# Patient Record
Sex: MALE | Race: WHITE | NOT HISPANIC OR LATINO | Employment: OTHER | URBAN - METROPOLITAN AREA
[De-identification: names, ages, dates, MRNs, and addresses within clinical notes are randomized per-mention and may not be internally consistent; named-entity substitution may affect disease eponyms.]

---

## 2017-01-19 DIAGNOSIS — M17.0 PRIMARY OSTEOARTHRITIS OF BOTH KNEES: Primary | ICD-10-CM

## 2017-01-19 RX ORDER — CELECOXIB 200 MG/1
200 CAPSULE ORAL 2 TIMES DAILY
Qty: 60 CAPSULE | Refills: 2 | Status: SHIPPED | OUTPATIENT
Start: 2017-01-19 | End: 2017-04-18 | Stop reason: SDUPTHER

## 2017-04-18 DIAGNOSIS — M17.0 PRIMARY OSTEOARTHRITIS OF BOTH KNEES: ICD-10-CM

## 2017-04-18 RX ORDER — CELECOXIB 200 MG/1
CAPSULE ORAL
Qty: 60 CAPSULE | Refills: 2 | Status: SHIPPED | OUTPATIENT
Start: 2017-04-18 | End: 2017-06-23 | Stop reason: ALTCHOICE

## 2017-05-23 DIAGNOSIS — Z00.00 ROUTINE GENERAL MEDICAL EXAMINATION AT A HEALTH CARE FACILITY: Primary | ICD-10-CM

## 2017-06-12 PROBLEM — M19.90 ARTHRITIS: Status: ACTIVE | Noted: 2017-06-12

## 2017-06-22 PROBLEM — M25.562 CHRONIC PAIN OF LEFT KNEE: Status: ACTIVE | Noted: 2017-06-22

## 2017-06-22 PROBLEM — G89.29 CHRONIC PAIN OF LEFT KNEE: Status: ACTIVE | Noted: 2017-06-22

## 2017-06-23 ENCOUNTER — OFFICE VISIT (OUTPATIENT)
Dept: ORTHOPEDICS | Facility: CLINIC | Age: 62
End: 2017-06-23
Payer: COMMERCIAL

## 2017-06-23 VITALS — WEIGHT: 247.44 LBS | BODY MASS INDEX: 35.42 KG/M2 | HEIGHT: 70 IN

## 2017-06-23 DIAGNOSIS — M17.11 PRIMARY OSTEOARTHRITIS OF RIGHT KNEE: ICD-10-CM

## 2017-06-23 DIAGNOSIS — M25.561 ACUTE PAIN OF RIGHT KNEE: Primary | ICD-10-CM

## 2017-06-23 PROCEDURE — 99213 OFFICE O/P EST LOW 20 MIN: CPT | Mod: 25,S$GLB,, | Performed by: NURSE PRACTITIONER

## 2017-06-23 PROCEDURE — 20610 DRAIN/INJ JOINT/BURSA W/O US: CPT | Mod: RT,S$GLB,, | Performed by: NURSE PRACTITIONER

## 2017-06-23 PROCEDURE — 99999 PR PBB SHADOW E&M-EST. PATIENT-LVL III: CPT | Mod: PBBFAC,,, | Performed by: NURSE PRACTITIONER

## 2017-06-23 RX ORDER — CYCLOBENZAPRINE HCL 5 MG
5 TABLET ORAL 3 TIMES DAILY PRN
Qty: 40 TABLET | Refills: 1 | Status: SHIPPED | OUTPATIENT
Start: 2017-06-23 | End: 2017-07-03

## 2017-06-23 RX ORDER — OXAPROZIN 600 MG/1
600 TABLET, FILM COATED ORAL DAILY
Qty: 30 TABLET | Refills: 1 | Status: SHIPPED | OUTPATIENT
Start: 2017-06-23 | End: 2017-07-10

## 2017-06-23 RX ADMIN — TRIAMCINOLONE ACETONIDE 40 MG: 40 INJECTION, SUSPENSION INTRA-ARTICULAR; INTRAMUSCULAR at 08:06

## 2017-06-25 RX ORDER — TRIAMCINOLONE ACETONIDE 40 MG/ML
40 INJECTION, SUSPENSION INTRA-ARTICULAR; INTRAMUSCULAR
Status: COMPLETED | OUTPATIENT
Start: 2017-06-23 | End: 2017-06-23

## 2017-06-26 NOTE — PROGRESS NOTES
CC: Pain of the Right Knee      HPI: Pt with right knee pain for the past several weeks. The pain is aching and medial and worse with activity. He had a left knee makoplasty several years ago at [facility], but he's not ready to move forward with surgery at this time. He has had cortisone and gel injections in the past with minimal improvement. He has tried several nsaids with minimal improvement, most recently celebrex. He recently retired and has been moving to his new house. He is very frustrated by the pain. He is ambulating without assistive device. There is not a limp.    ROS  General: denies fever and chills  Resp: no c/o sob  CVS: no c/o cp  MSK: c/o right knee pain which is medial and aching    PE  General: AAOx3, pleasant and cooperative  Resp: respirations even and unlabored  MSK: right knee exam  5 degrees extension  110 degrees flexion  No warmth or erythema   - effusion    Xray:  Reviewed by me from September: There is moderate DJD and a varus deformity.  No fracture dislocation bone destruction seen.    Assessment:  Right knee djd    Plan:  Cortisone injection right knee today  Change nsaids to daypro  Consider surgical options and f/u when ready or sooner as needed.    Knee Injection Procedure Note    Pre-operative Diagnosis: right knee degenerative arthritis    Post-operative Diagnosis: same    Indications: right knee pain    Anesthesia: none    Procedure Details     Verbal consent was obtained for the procedure. The injection site was identified and the skin was prepared with alcohol. The right knee was injected from an anterolateral approach with 1 ml of Kenalog and 5 ml Lidocaine under sterile technique using a 22 gauge needle. The needle was removed and the area cleansed and dressed.    Complications:  None; patient tolerated the procedure well.    he was advised to rest the knee today, using ice and elevation as needed for comfort and swelling. he did receive immediate relief of the knee pain. he  was told this would be short lived and is secondary to the lidocaine. he may have an increase in discomfort tonight followed by steady improvement over the next several days. It may take 1-3 weeks following the injection to get the full benefit of the medication.

## 2017-06-28 ENCOUNTER — CLINICAL SUPPORT (OUTPATIENT)
Dept: INTERNAL MEDICINE | Facility: CLINIC | Age: 62
End: 2017-06-28
Payer: COMMERCIAL

## 2017-06-28 ENCOUNTER — OFFICE VISIT (OUTPATIENT)
Dept: INTERNAL MEDICINE | Facility: CLINIC | Age: 62
End: 2017-06-28
Payer: COMMERCIAL

## 2017-06-28 ENCOUNTER — HOSPITAL ENCOUNTER (OUTPATIENT)
Dept: CARDIOLOGY | Facility: CLINIC | Age: 62
Discharge: HOME | End: 2017-06-28
Payer: COMMERCIAL

## 2017-06-28 DIAGNOSIS — Z00.00 ROUTINE GENERAL MEDICAL EXAMINATION AT A HEALTH CARE FACILITY: ICD-10-CM

## 2017-06-28 DIAGNOSIS — L57.0 AK (ACTINIC KERATOSIS): ICD-10-CM

## 2017-06-28 DIAGNOSIS — Z00.00 ROUTINE GENERAL MEDICAL EXAMINATION AT A HEALTH CARE FACILITY: Primary | ICD-10-CM

## 2017-06-28 DIAGNOSIS — E03.8 SUBCLINICAL HYPOTHYROIDISM: ICD-10-CM

## 2017-06-28 LAB
ALBUMIN SERPL BCP-MCNC: 3.9 G/DL
ALP SERPL-CCNC: 63 U/L
ALT SERPL W/O P-5'-P-CCNC: 30 U/L
ANION GAP SERPL CALC-SCNC: 10 MMOL/L
AST SERPL-CCNC: 21 U/L
BILIRUB SERPL-MCNC: 0.6 MG/DL
BUN SERPL-MCNC: 23 MG/DL
CALCIUM SERPL-MCNC: 9.7 MG/DL
CHLORIDE SERPL-SCNC: 102 MMOL/L
CHOLEST/HDLC SERPL: 3.4 {RATIO}
CO2 SERPL-SCNC: 28 MMOL/L
COMPLEXED PSA SERPL-MCNC: 0.65 NG/ML
CREAT SERPL-MCNC: 1 MG/DL
ERYTHROCYTE [DISTWIDTH] IN BLOOD BY AUTOMATED COUNT: 12.3 %
EST. GFR  (AFRICAN AMERICAN): >60 ML/MIN/1.73 M^2
EST. GFR  (NON AFRICAN AMERICAN): >60 ML/MIN/1.73 M^2
ESTIMATED AVG GLUCOSE: 108 MG/DL
GLUCOSE SERPL-MCNC: 97 MG/DL
HBA1C MFR BLD HPLC: 5.4 %
HCT VFR BLD AUTO: 44.4 %
HDL/CHOLESTEROL RATIO: 29 %
HDLC SERPL-MCNC: 200 MG/DL
HDLC SERPL-MCNC: 58 MG/DL
HGB BLD-MCNC: 15.6 G/DL
LDLC SERPL CALC-MCNC: 118.8 MG/DL
MCH RBC QN AUTO: 32.8 PG
MCHC RBC AUTO-ENTMCNC: 35.1 %
MCV RBC AUTO: 93 FL
NONHDLC SERPL-MCNC: 142 MG/DL
PLATELET # BLD AUTO: 252 K/UL
PMV BLD AUTO: 10.1 FL
POTASSIUM SERPL-SCNC: 4.4 MMOL/L
PROT SERPL-MCNC: 8 G/DL
RBC # BLD AUTO: 4.76 M/UL
SODIUM SERPL-SCNC: 140 MMOL/L
T4 FREE SERPL-MCNC: 0.94 NG/DL
TRIGL SERPL-MCNC: 116 MG/DL
TSH SERPL DL<=0.005 MIU/L-ACNC: 4.29 UIU/ML
WBC # BLD AUTO: 5.5 K/UL

## 2017-06-28 PROCEDURE — 97802 MEDICAL NUTRITION INDIV IN: CPT | Mod: S$GLB,,, | Performed by: INTERNAL MEDICINE

## 2017-06-28 PROCEDURE — 84153 ASSAY OF PSA TOTAL: CPT

## 2017-06-28 PROCEDURE — 80053 COMPREHEN METABOLIC PANEL: CPT

## 2017-06-28 PROCEDURE — 85027 COMPLETE CBC AUTOMATED: CPT

## 2017-06-28 PROCEDURE — 93000 ELECTROCARDIOGRAM COMPLETE: CPT | Mod: S$GLB,,, | Performed by: INTERNAL MEDICINE

## 2017-06-28 PROCEDURE — 83036 HEMOGLOBIN GLYCOSYLATED A1C: CPT

## 2017-06-28 PROCEDURE — 99397 PER PM REEVAL EST PAT 65+ YR: CPT | Mod: S$GLB,,, | Performed by: INTERNAL MEDICINE

## 2017-06-28 PROCEDURE — 80061 LIPID PANEL: CPT

## 2017-06-28 PROCEDURE — 84443 ASSAY THYROID STIM HORMONE: CPT

## 2017-06-28 PROCEDURE — 84439 ASSAY OF FREE THYROXINE: CPT

## 2017-06-28 PROCEDURE — 99999 PR PBB SHADOW E&M-EST. PATIENT-LVL III: CPT | Mod: PBBFAC,,, | Performed by: INTERNAL MEDICINE

## 2017-06-28 PROCEDURE — 97750 PHYSICAL PERFORMANCE TEST: CPT | Mod: S$GLB,,, | Performed by: INTERNAL MEDICINE

## 2017-06-28 NOTE — PROGRESS NOTES
"Nutrition Assessment  Client name:  Elmer Borrero  :  1955  Age:  61 y.o.  Gender:  male    PMH: mother has arthritis  Client states: Client has an extensive PMH list. Many things in his life have changed since last visit. He retired March this year, is living in a new house and still in the process of moving boxes and transports his Dad daily to visit his Mom who is temporarily in an Assisted Living Facility. He enjoys fishing and working with his hands such as crafting rings from bolts in his workshop. His joint/knee pain has worsened from last year and his back vertebrate is deteriorating. It is hard for him to ride his bike or stand on his feet for long periods because it is hard on his joints. He has tried Glucosamine, joint support supplement and Krill oil all without any results and has discontinued them all. His PCP in [place], began him on Oxaprozin and Flexeril which hs given him good results in relaxing his "tight leg muscle". He needs knee surgery, but is hesitant because past knee surgery, when coming out of anesthesia, his blood pressure danay to 200 and this is of great concern to him if this may reoccur. He will consider OR when the pain is intolerable. He no longer drinks distilled spirits, but mentions that "he needs to drink less alcohol" - but makes no specific commitment to reduction.  He asks about the use of Green Juices and Smoothies as the young guys at his previous job were drinking them and losing wt. He then adds he tried the drink, but did not like it. Also he asks about the benefits of Apple Cider vinegar for wt. Loss.  He is interested in losing wt. And mentions that he has to eat less fried Shrimp po boys and fries, reduce his portions, but adds that he does shares the meal with his wife, but dining out more due to his schedule.       Past Medical History:   Diagnosis Date    Arthritis     BPH (benign prostatic hyperplasia)     Hypertension     Kidney stone     GEOVANNA " "on CPAP     Renal calculi        Social History    Marital status:    Employment:  [employer] - retired March 2017  Social History   Substance Use Topics    Smoking status: Never Smoker    Smokeless tobacco: Never Used    Alcohol use Yes      Comment: 3 glasses wine daily        Current medications:  has a current medication list which includes the following prescription(s): allopurinol, aspirin, cyanocobalamin (vitamin b-12), cyclobenzaprine, diphenhydramine, hydrochlorothiazide, loratadine, losartan, montelukast,oxaprozin, and tamsulosin, and the following Facility-Administered Medications: euflexxa and triamcinolone acetonide.  Vitamins, minerals, and/or supplements:  unknown   Food allergies or intolerances: none     Food History  Dining out more recently since Mother in Assisted Living   Drinking no added sugar Cranberry Pomegranate juice and unsweetened tea     Exercise History:  No formal exercise - limited by knee pain    Lab Reports (labs unavailable at visit)   Total Cholesterol:  200   Triglycerides:  116  HDL:  58  LDL:  118.8  Glucose:  97  HbA1c:  5.4  BP:  166/86     Weight History  Height:  5'8"     Weight:  245  BMI:  37.33  % Body Fat:  36.62    Diagnosis  RMR (Method:  Body [place]):  1670 kcal  Activity Factor:  1.3  ROMAINE:  2171 - 250 = 1921    Obesity related to lack of physical activity (limited by knee pain) and improper food choices/portions and frequency of dining out as evidenced by BMI: 37.33 and Body fat: 36.62%.    Intervention    Goals:  1.  124 oz. Healthy fluids daily  2.  Apple cider vinegar if desired  3.  Reduce sauces and fried foods by 25%  4.  Consider 50% reduction in ETOH consumption  5.  Consider purchasing lean meats - i.e.: 93% ground beef  6.  Wt. <245#    7.  Consult with MD re: back/spine health         Current labs unavailable at visit.Discussed the number of empty calories in each glass of wine and how reduction can contribute to wt. Loss creating a daily deficit of " "500 calories. Explained that food must be the focus as regular exercise at this time is not tolerable. Explained the importance of BEE and how the body sheds pounds. Encouraged smaller portions using the plating method as visual and high focus on 1/2 plate non-starchy vegetables. Discussed the difference in fat and calories among 3 categories of meat and why lean is best for wt. Loss and his past medical history. Reviewed the Fast Food Guide for healthier choices when foods have to be chosen on the run. Explained the relevance of adequate hydration to fat loss and lipid metabolism and daily goal. Reviewed and encouraged use of the Eat Fit shopping list as a turn key guideline. Cautioned client about drinking juices and checking label for no added sugar. Answered question about apple cider vinegar and scientific results and green smoothies. Shared a cookbook titled, "Trim and Terrific" by Annie Schwartz for tasty cajun cooking with minimum fat and calories.    Handouts provided:  Meal Planning Guide  Restaurant Guide  Eat Fit Shopping List  Eat Fit Janette  Fast Food Guide  Vitamin/Mineral Guide    Monitoring/Evaluation    Monitor the following:  Weight  BMI  % Body Fat  Caloric intake  Labs:  CMP/lipids    Follow Up Plan:  Follow up with client in 1-2 years  "

## 2017-06-29 NOTE — PROGRESS NOTES
Subjective:       Patient ID: Elmer Borrero is a 61 y.o. male.    Chief Complaint: No chief complaint on file.    HPI   Mr. Borrero has no history of pulmonary disease, though regularly takes a beta-blocker for hypertension. He reports chronic lower back pain and right knee pain which limits his physical activity and mobility. The curl up, push up and flexibility sit and reach tests have all been deferred because of the lower back pain and not being able to get up and down off of the ground. Blood pressure was not within testing limits but necessary testing was already deferred.     Current Exercise Routine:   - None    Review of Systems    Objective:      The fitness evaluation results are as follows:    D.O.S. 6/28/2017 6/30/2016 7/22/2015   Height (in): 68 67.2 68   Weight (lbs): 245 251 251   BMI: 37.69697 39.056216 38.309421   Body Fat (%): 32.62 33.50 34.80   Waist (cm): 121 125 127   Hip (cm): 115 119 117   WHR: 1.05 1.05 1.09   RBP (mmHg): 166/86 140/80 130/80   RHR (bpm): 64 64 64    Strength R (lbs)t: 61.93146 70 59.463807    Strength Lt (lbs): 61.53993 71 64.964106   Push-up Assessment: Deferred Deferred Deferred   Curl-up Assessment: Deferred Deferred Deferred   Flexibility Testing (cm): Deferred Deferred 0   REE (kcals): 1670 2190 1980     Physical Exam    Assessment:      Age/Gender Stratified Assessment:    Resting BP: Elevated   Body Fat %: Poor   WHR Risk Factor: High Risk    Strength R: Below Average    Strength L: Average   Upper Body Endurance: Deferred   Abdominal Endurance: Deferred   Lower body Flexibility: Deferred     1. Routine general medical examination at a health care facility        Plan:     Mr. Borrero should begin incorporating aerobic exercise such as biking and swimming in order to reach recommended guidelines of 150 minutes of aerobic activity each week at a moderate intensity level. He should also begin resistance training to help increase  current level of muscular strength and endurance. Upper body strength has significantly decreased over the past year. Mr. Borrero needs to incorporate resistance training in order to improve his quality of life. Daily stretching should be performed in order to increase level of flexibility.     We have set a goal to ride the bike 2 days per week for 30 minutes and to stretch daily.

## 2017-07-06 VITALS — SYSTOLIC BLOOD PRESSURE: 148 MMHG | HEART RATE: 68 BPM | DIASTOLIC BLOOD PRESSURE: 84 MMHG

## 2017-07-06 PROBLEM — L57.0 AK (ACTINIC KERATOSIS): Status: ACTIVE | Noted: 2017-07-06

## 2017-07-06 PROBLEM — E03.8 SUBCLINICAL HYPOTHYROIDISM: Status: ACTIVE | Noted: 2017-07-06

## 2017-07-06 NOTE — PROGRESS NOTES
Subjective:       Patient ID: Elmer Borrero is a 61 y.o. male.    Chief Complaint: Executive Health    HPIPleasant gentleman from [place], [place] here for his Executive Health exam. Overall doing well. We discussed his history of degenerative disc disease of the lumbar spine and knees. He is currently having issues with his right knee and recently was seen by Orthopedics and underwent an steroid injection in his knee with some improvement. He was placed on Daypro which he takes as needed. We also dicussed the importance of proper nutrition, exercise, weight management vis jose future health issues. He recently retired and will make more time for these activities.  I am sending copies of his studies including blood work that showed mild increase in TSH level at 4.291 - new finding. Free T4  Was low normal at 0.94. I will repeat TSH level in 4 months and treat if indicated. All other parameters were unremarkable including EKG.  Review of Systems   Constitutional: Positive for fatigue. Negative for activity change, appetite change and unexpected weight change.   HENT: Negative.    Respiratory: Negative for cough, shortness of breath and wheezing.    Cardiovascular: Negative for chest pain and palpitations.   Gastrointestinal: Negative for abdominal distention, abdominal pain and blood in stool.   Endocrine: Negative.    Genitourinary: Negative for difficulty urinating.   Musculoskeletal: Positive for arthralgias and back pain. Negative for joint swelling.   Neurological: Negative for dizziness, weakness, light-headedness and headaches.   Hematological: Negative.        Objective:      Physical Exam   Constitutional: He is oriented to person, place, and time. He appears well-developed and well-nourished. No distress.   HENT:   Head: Normocephalic and atraumatic.   Right Ear: External ear normal.   Left Ear: External ear normal.   Mouth/Throat: Oropharynx is clear and moist. No oropharyngeal exudate.   Eyes:  Conjunctivae and EOM are normal. Pupils are equal, round, and reactive to light. Right eye exhibits no discharge. Left eye exhibits no discharge. No scleral icterus.   Neck: Normal range of motion. Neck supple. No JVD present. No thyromegaly present.   Cardiovascular: Normal rate, regular rhythm, normal heart sounds and intact distal pulses.    No murmur heard.  Pulmonary/Chest: Effort normal and breath sounds normal. No respiratory distress. He has no wheezes.   Abdominal: Soft. Bowel sounds are normal. He exhibits no distension and no mass. There is no tenderness. A hernia is present.   Umbilical hernia.   Musculoskeletal: He exhibits no edema or tenderness.   Lymphadenopathy:     He has no cervical adenopathy.   Neurological: He is alert and oriented to person, place, and time. No cranial nerve deficit.   Skin: Skin is warm and dry. No rash noted. He is not diaphoretic. No erythema.   Small AK R proximal forearm.  Lipoma R supraclavicular area.   Psychiatric: He has a normal mood and affect. His behavior is normal. Judgment and thought content normal.   Nursing note and vitals reviewed.      Assessment:       1. Routine general medical examination at a health care facility    2. Subclinical hypothyroidism    3. AK (actinic keratosis)        Plan:    1. Repeat TSH level in 4 months.         2. Refer to Dermatology.         3. Return to clinic in 1 ryear or sooner as indicated.

## 2017-07-09 DIAGNOSIS — M17.0 PRIMARY OSTEOARTHRITIS OF BOTH KNEES: ICD-10-CM

## 2017-07-10 RX ORDER — CELECOXIB 200 MG/1
CAPSULE ORAL
Qty: 60 CAPSULE | Refills: 2 | Status: SHIPPED | OUTPATIENT
Start: 2017-07-10 | End: 2017-09-04 | Stop reason: ALTCHOICE

## 2017-07-17 RX ORDER — LOSARTAN POTASSIUM 50 MG/1
TABLET ORAL
Qty: 90 TABLET | Refills: 3 | Status: SHIPPED | OUTPATIENT
Start: 2017-07-17 | End: 2018-07-04 | Stop reason: SDUPTHER

## 2017-09-04 DIAGNOSIS — M25.561 ACUTE PAIN OF RIGHT KNEE: ICD-10-CM

## 2017-09-04 RX ORDER — OXAPROZIN 600 MG/1
600 TABLET, FILM COATED ORAL DAILY
Qty: 30 TABLET | Refills: 1 | Status: SHIPPED | OUTPATIENT
Start: 2017-09-04 | End: 2017-11-14 | Stop reason: SDUPTHER

## 2017-10-17 ENCOUNTER — OFFICE VISIT (OUTPATIENT)
Dept: ORTHOPEDICS | Facility: CLINIC | Age: 62
End: 2017-10-17
Payer: COMMERCIAL

## 2017-10-17 VITALS — WEIGHT: 247.38 LBS | BODY MASS INDEX: 35.49 KG/M2

## 2017-10-17 DIAGNOSIS — M17.11 PRIMARY OSTEOARTHRITIS OF RIGHT KNEE: Primary | ICD-10-CM

## 2017-10-17 PROCEDURE — 99999 PR PBB SHADOW E&M-EST. PATIENT-LVL III: CPT | Mod: PBBFAC,,, | Performed by: NURSE PRACTITIONER

## 2017-10-17 PROCEDURE — 20610 DRAIN/INJ JOINT/BURSA W/O US: CPT | Mod: S$GLB,,, | Performed by: NURSE PRACTITIONER

## 2017-10-17 PROCEDURE — 99499 UNLISTED E&M SERVICE: CPT | Mod: S$GLB,,, | Performed by: NURSE PRACTITIONER

## 2017-10-17 RX ORDER — TRIAMCINOLONE ACETONIDE 40 MG/ML
40 INJECTION, SUSPENSION INTRA-ARTICULAR; INTRAMUSCULAR
Status: COMPLETED | OUTPATIENT
Start: 2017-10-17 | End: 2017-10-17

## 2017-10-17 RX ADMIN — TRIAMCINOLONE ACETONIDE 40 MG: 40 INJECTION, SUSPENSION INTRA-ARTICULAR; INTRAMUSCULAR at 01:10

## 2017-10-17 NOTE — PROGRESS NOTES
Pt with known djd of the right knee. He comes in to this visit with his wife and requests a cortisone injection for pain relief. His last injection was 6/23/17.    Knee Injection Procedure Note    Pre-operative Diagnosis: right knee degenerative arthritis    Post-operative Diagnosis: same    Indications: right knee pain    Anesthesia: none    Procedure Details     Verbal consent was obtained for the procedure. The injection site was identified and the skin was prepared with alcohol. The right knee was injected from an anterolateral approach with 1 ml of Kenalog and 5 ml Lidocaine under sterile technique using a 22 gauge needle. The needle was removed and the area cleansed and dressed.    Complications:  None; patient tolerated the procedure well.    he was advised to rest the knee today, using ice and elevation as needed for comfort and swelling. he did receive immediate relief of the knee pain. he was told this would be short lived and is secondary to the lidocaine. he may have an increase in discomfort tonight followed by steady improvement over the next several days. It may take 1-3 weeks following the injection to get the full benefit of the medication.

## 2017-11-14 DIAGNOSIS — M25.561 ACUTE PAIN OF RIGHT KNEE: ICD-10-CM

## 2017-11-14 RX ORDER — OXAPROZIN 600 MG/1
600 TABLET, FILM COATED ORAL DAILY
Qty: 30 TABLET | Refills: 1 | Status: SHIPPED | OUTPATIENT
Start: 2017-11-14 | End: 2017-11-14 | Stop reason: SDUPTHER

## 2017-11-15 RX ORDER — OXAPROZIN 600 MG/1
600 TABLET, FILM COATED ORAL DAILY
Qty: 30 TABLET | Refills: 0 | Status: SHIPPED | OUTPATIENT
Start: 2017-11-15 | End: 2018-01-29 | Stop reason: SDUPTHER

## 2018-01-29 DIAGNOSIS — M25.561 ACUTE PAIN OF RIGHT KNEE: ICD-10-CM

## 2018-01-29 RX ORDER — OXAPROZIN 600 MG/1
600 TABLET, FILM COATED ORAL DAILY
Qty: 30 TABLET | Refills: 0 | Status: SHIPPED | OUTPATIENT
Start: 2018-01-29 | End: 2018-02-26 | Stop reason: SDUPTHER

## 2018-02-26 DIAGNOSIS — M25.561 ACUTE PAIN OF RIGHT KNEE: ICD-10-CM

## 2018-02-26 RX ORDER — OXAPROZIN 600 MG/1
600 TABLET, FILM COATED ORAL DAILY
Qty: 30 TABLET | Refills: 0 | Status: SHIPPED | OUTPATIENT
Start: 2018-02-26 | End: 2018-03-26 | Stop reason: SDUPTHER

## 2018-03-26 DIAGNOSIS — M25.561 ACUTE PAIN OF RIGHT KNEE: ICD-10-CM

## 2018-03-26 RX ORDER — OXAPROZIN 600 MG/1
600 TABLET, FILM COATED ORAL DAILY
Qty: 30 TABLET | Refills: 0 | Status: SHIPPED | OUTPATIENT
Start: 2018-03-26 | End: 2018-04-23 | Stop reason: SDUPTHER

## 2018-04-23 DIAGNOSIS — M25.561 ACUTE PAIN OF RIGHT KNEE: ICD-10-CM

## 2018-04-23 RX ORDER — OXAPROZIN 600 MG/1
600 TABLET, FILM COATED ORAL DAILY
Qty: 30 TABLET | Refills: 0 | Status: SHIPPED | OUTPATIENT
Start: 2018-04-23 | End: 2018-06-07 | Stop reason: SDUPTHER

## 2018-06-07 DIAGNOSIS — M25.561 ACUTE PAIN OF RIGHT KNEE: ICD-10-CM

## 2018-06-11 RX ORDER — OXAPROZIN 600 MG/1
600 TABLET, FILM COATED ORAL DAILY
Qty: 30 TABLET | Refills: 0 | Status: SHIPPED | OUTPATIENT
Start: 2018-06-11 | End: 2018-07-08 | Stop reason: SDUPTHER

## 2018-07-08 DIAGNOSIS — M25.561 ACUTE PAIN OF RIGHT KNEE: ICD-10-CM

## 2018-07-08 RX ORDER — OXAPROZIN 600 MG/1
600 TABLET, FILM COATED ORAL DAILY
Qty: 30 TABLET | Refills: 0 | Status: SHIPPED | OUTPATIENT
Start: 2018-07-08 | End: 2018-08-04 | Stop reason: SDUPTHER

## 2018-07-09 RX ORDER — LOSARTAN POTASSIUM 50 MG/1
TABLET ORAL
Qty: 90 TABLET | Refills: 3 | Status: SHIPPED | OUTPATIENT
Start: 2018-07-09 | End: 2018-11-07

## 2018-07-26 ENCOUNTER — CLINICAL SUPPORT (OUTPATIENT)
Dept: INTERNAL MEDICINE | Facility: CLINIC | Age: 63
End: 2018-07-26
Payer: COMMERCIAL

## 2018-07-26 ENCOUNTER — OFFICE VISIT (OUTPATIENT)
Dept: INTERNAL MEDICINE | Facility: CLINIC | Age: 63
End: 2018-07-26
Payer: COMMERCIAL

## 2018-07-26 DIAGNOSIS — Z00.00 ROUTINE GENERAL MEDICAL EXAMINATION AT A HEALTH CARE FACILITY: Primary | ICD-10-CM

## 2018-07-26 LAB
25(OH)D3+25(OH)D2 SERPL-MCNC: 39 NG/ML
ALBUMIN SERPL BCP-MCNC: 3.8 G/DL
ALP SERPL-CCNC: 59 U/L
ALT SERPL W/O P-5'-P-CCNC: 32 U/L
ANION GAP SERPL CALC-SCNC: 12 MMOL/L
AST SERPL-CCNC: 23 U/L
BILIRUB SERPL-MCNC: 0.5 MG/DL
BUN SERPL-MCNC: 15 MG/DL
CALCIUM SERPL-MCNC: 9.5 MG/DL
CHLORIDE SERPL-SCNC: 101 MMOL/L
CHOLEST SERPL-MCNC: 189 MG/DL
CHOLEST/HDLC SERPL: 4.1 {RATIO}
CO2 SERPL-SCNC: 27 MMOL/L
COMPLEXED PSA SERPL-MCNC: 1.1 NG/ML
CREAT SERPL-MCNC: 0.8 MG/DL
ERYTHROCYTE [DISTWIDTH] IN BLOOD BY AUTOMATED COUNT: 12 %
EST. GFR  (AFRICAN AMERICAN): >60 ML/MIN/1.73 M^2
EST. GFR  (NON AFRICAN AMERICAN): >60 ML/MIN/1.73 M^2
ESTIMATED AVG GLUCOSE: 108 MG/DL
GLUCOSE SERPL-MCNC: 112 MG/DL
HBA1C MFR BLD HPLC: 5.4 %
HCT VFR BLD AUTO: 43.5 %
HDLC SERPL-MCNC: 46 MG/DL
HDLC SERPL: 24.3 %
HGB BLD-MCNC: 15.1 G/DL
LDLC SERPL CALC-MCNC: 104.4 MG/DL
MCH RBC QN AUTO: 32.5 PG
MCHC RBC AUTO-ENTMCNC: 34.7 G/DL
MCV RBC AUTO: 94 FL
NONHDLC SERPL-MCNC: 143 MG/DL
PLATELET # BLD AUTO: 239 K/UL
PMV BLD AUTO: 10.4 FL
POTASSIUM SERPL-SCNC: 3.4 MMOL/L
PROT SERPL-MCNC: 7.4 G/DL
RBC # BLD AUTO: 4.65 M/UL
SODIUM SERPL-SCNC: 140 MMOL/L
TRIGL SERPL-MCNC: 193 MG/DL
TSH SERPL DL<=0.005 MIU/L-ACNC: 3.46 UIU/ML
WBC # BLD AUTO: 4.7 K/UL

## 2018-07-26 PROCEDURE — 82306 VITAMIN D 25 HYDROXY: CPT

## 2018-07-26 PROCEDURE — 97750 PHYSICAL PERFORMANCE TEST: CPT | Mod: S$GLB,,, | Performed by: INTERNAL MEDICINE

## 2018-07-26 PROCEDURE — 3078F DIAST BP <80 MM HG: CPT | Mod: CPTII,S$GLB,, | Performed by: INTERNAL MEDICINE

## 2018-07-26 PROCEDURE — 84443 ASSAY THYROID STIM HORMONE: CPT

## 2018-07-26 PROCEDURE — 97802 MEDICAL NUTRITION INDIV IN: CPT | Mod: S$GLB,,, | Performed by: INTERNAL MEDICINE

## 2018-07-26 PROCEDURE — 3075F SYST BP GE 130 - 139MM HG: CPT | Mod: CPTII,S$GLB,, | Performed by: INTERNAL MEDICINE

## 2018-07-26 PROCEDURE — 99386 PREV VISIT NEW AGE 40-64: CPT | Mod: S$GLB,,, | Performed by: INTERNAL MEDICINE

## 2018-07-26 PROCEDURE — 84153 ASSAY OF PSA TOTAL: CPT

## 2018-07-26 PROCEDURE — 83036 HEMOGLOBIN GLYCOSYLATED A1C: CPT

## 2018-07-26 PROCEDURE — 80053 COMPREHEN METABOLIC PANEL: CPT

## 2018-07-26 PROCEDURE — 99999 PR PBB SHADOW E&M-EST. PATIENT-LVL III: CPT | Mod: PBBFAC,,, | Performed by: INTERNAL MEDICINE

## 2018-07-26 PROCEDURE — 80061 LIPID PANEL: CPT

## 2018-07-26 PROCEDURE — 85027 COMPLETE CBC AUTOMATED: CPT

## 2018-07-26 NOTE — PROGRESS NOTES
"Nutrition Assessment  This is a general nutrition consultation as per the contractual agreement of the client employer's insurance provider.    Client name:  Elmer Borrero  :  1955  Age:  62 y.o.  Gender:  male     PMH: Mother has arthritis  Client 's wife requests if she can join her  for his consultation, and he agrees that is fine with him. He presents wearing a hat with the word "Grouchy" embroidered on the visor.  Since last visit his mother  and in spite of being retired, he states he has less time than when he worked for [employer]. His medical history includes a kidney stone and currently is prescribed daily Allopurinol by his physican in [place]. Also he has re-started Krill oil and increased to 2 capsules per the advisement of his MD. He continues to have limited mobility with pain of his right knee complicated by his wt. Steroid injections are required 2x/yr which does give him some relief. States that current prescription medications are effective initially and then appear to not help so much. His joint/knee pain has worsened from last year and his back vertebrate is deteriorating. It is hard for him to ride his bike or stand on his feet for long periods because it is hard on his joints. He has tried Glucosamine, joint support supplement without any relief. His needs knee surgery, but is hesitant because his previous knee surgery, when coming out of anesthesia, his blood pressure danay to 200 and this is of great concern to him if this may reoccur. He will consider OR when the pain is intolerable. To assist with his sinus congestion, he recently purchased local honey from [place] and will assess the results. Additionally his left foot hurts, but MD said no explanation nor diagnoses could be determined. He manages to cut his lawn and daughter's lawn, but adds he has "lots of obstacles". Three weeks ago he had a cold and his endurance continues to be low.  He admits that his motivation " "is low and when he worked he had so much structure that he wants to enjoy his life. When asked about his ETOH intake, his reported number of drinks were 50% less than what his wife shared and she corrected him. He has 2 beers prior to dinner and 4 glasses of wine while watching TV. His speaks about his food intake in general terms and mentions that since his granddaughter stayed with them for the summer he has been eating more fried chicken. When asked what he was willing to do, he replied he would try to ride his bike as he desired increased stamina.    Past Medical History:   Diagnosis Date    Arthritis     BPH (benign prostatic hyperplasia)     Hypertension     Kidney stone     GEOVANNA on CPAP     Renal calculi        Social History    Marital status:    Employment:  [employer] -retired  Social History   Substance Use Topics    Smoking status: Never Smoker    Smokeless tobacco: Never Used    Alcohol use Yes      Comment: 14 beers/week,  28 glasses of wine/wk        Current medications:  has a current medication list which includes the following prescription(s): allopurinol, aspirin, cyclobenzaprine, finasteride, hydrochlorothiazide, krill oil, loratadine, losartan, montelukast, multivitamin, oxaprozin, and tamsulosin, and the following Facility-Administered Medications: euflexxa and triamcinolone acetonide.  Vitamins, minerals, and/or supplements:  Krill oil, MVI    Food allergies or intolerances:  none     Food History  Dining out more eating Fried Chicken  Drinking no added sugar Cranberry Pomegranate juice and unsweetened tea  Beer and wine daily    Exercise History: Limited mobility and pain in knee, spine and foot. Cuts 2 lawns    Lab Reports   Total Cholesterol:  189    Triglycerides:  193  HDL:  46  LDL:  104.4   Glucose:  112  HbA1c:  5.4  BP:  132/80     Weight History  Height:  5'8"     Weight:  261  BMI:  39.77  % Body Fat:  34.26    Diagnosis  RMR (Method:  Body [place]):  2010 kcal  Activity " Factor:  1.3  ROMAINE:  2613 - 500 =  2113    Obesity related to inadequate physical activity and excessive caloric intake as evidenced by BMI: 39.77 and Body fat: 34.26%.    Intervention    Goals:  1.  Increase stamina by 25%  2.  Consider reducing ETOH intake by 50%   3.  Ride bike daily for 5  Minutes and increase as tolerated   4.  Consider wt loss  5.  Consider eating dinner earlier to allow exercise time    Lab results reviewed with client ad complimented on heathy numbers. Discussed the acceptable healthy amount of ETOH daily for men and women. Encouraged any form and quantity of exercise and gave encouragement that it takes time to recondition the muscles and beginning with 5 minutes is 100% improvement over last year.    Handouts provided:  Meal Planning Guide  Restaurant Guide  Eat Fit Shopping List  Eat Fit Janette  Fast Food Guide  Vitamin/Mineral Guide    Monitoring/Evaluation    Monitor the following:  Weight  BMI  % Body Fat  Caloric intake  Labs: CMP/Lipids    Follow Up Plan:  Follow up with client in 1-2 years

## 2018-07-26 NOTE — PROGRESS NOTES
Subjective:       Patient ID: Elmer Borrero is a 62 y.o. male.    Chief Complaint: No chief complaint on file.    HPI   Pt. Has no significant cardiovascular or pulmonary history.  Pt. Is taking an ARB, a calcium channel blocker, and a diuretic to control his blood pressure.      Physical Limitations:  Pt. Has issues with his left foot, both knees, and his back.  He is not bale to get on the floor and deferred the push-up, curl up, and flexibility tests.    Current exercise routine: None    Goals: Pt. Has a goal weight of 245 lbs.  He knows that he needs to lose weight for his health and to take pressure off oh his joints.      Fun Facts:  Pt. Was semi engaged but mostly just sat and listened    Review of Systems    Objective:     The fitness evaluation results are as follows:  D.O.S. 7/26/2018 6/28/2017 6/30/2016 7/22/2015   Height (in): 68 68 67.2 68   Weight (lbs): 261 245 251 251   BMI: 39.77085 37.880650 39.658617 38.45728   Body Fat (%): 34.26 32.62 33.50 34.80   Waist (cm): 124 121 125 127   Hip (cm): 122 115 119 117   WHR: 1.02 1.05 1.05 1.09   RBP (mmHg): 132/80 166/86 140/80 130/80   RHR (bpm): 70 64 64 64    Strength R (lbs)t: 48.37498 61.515717 70 59.90310    Strength Lt (lbs): 65 61.531198 71 64.32575   Push-up Assessment: deferred Deferred Deferred Deferred   Curl-up Assessment: deferred Deferred Deferred Deferred   Flexibility Testing (cm): deferred Deferred Deferred 0   REE (kcals): 2010 1670 2190 1980         Physical Exam    Assessment:     Age/gender stratified assessment:  Resting BP: Within Normal Limits   Body Fat %: poor   WHR Risk Factor: high risk    Strength R: below average    Strength L: average   Upper Body Endurance: deferred   Abdominal Endurance: deferred   Lower body Flexibiltiy: deferred       1. Routine general medical examination at a health care facility        Plan:       Recommended fitness guidelines:    -150 minutes of moderate intensity aerobic  exercise per week or 75 minutes of vigorous intensity aerobic exercise per week.   Try to add some biking or swimming into your routine for some non-weight bearing aerobic activity.    -2 to 4 days per week of resistance training for each muscle group.  Start to incorporate the upper and lower body resistance training program given during the evaluation.    -Daily stretching with a hold of at least 30 seconds per muscle group.

## 2018-08-02 VITALS
HEART RATE: 70 BPM | SYSTOLIC BLOOD PRESSURE: 132 MMHG | WEIGHT: 263.19 LBS | DIASTOLIC BLOOD PRESSURE: 74 MMHG | BODY MASS INDEX: 37.77 KG/M2 | TEMPERATURE: 98 F

## 2018-08-02 NOTE — PROGRESS NOTES
Subjective:       Patient ID: Elmer Borrero is a 62 y.o. male.    Chief Complaint: Executive Health    HPIPleasant gentleman from [place], [place] here for his Executive Health exam. Overall doing well and had no specific complaints. He reported having had issues with back strain a couple of months ago, but that has resolved for the most part. He has been having issues with knee pain. He has history of degenerative arthritis of the knees and is s/p left partial knee replacement in the past. He has been experiencing pain in his right knee and has had steroid injections for relief, albeit temporary. He is likely going to need a replacement in the future, but he is not ready for that at this time.  I am sending copies of his studies including blood work hat showed mild increase in fasting glucose level a 112. Lipd profile showed mild increase in triglycerides at 163, but all other parameters were within normal limits  Review of Systems   Constitutional: Negative for activity change, appetite change, fatigue and unexpected weight change.   HENT: Negative.    Eyes: Negative for visual disturbance.   Respiratory: Negative for cough and shortness of breath.    Cardiovascular: Negative for chest pain, palpitations and leg swelling.   Genitourinary: Negative for difficulty urinating.   Musculoskeletal: Positive for arthralgias, back pain and joint swelling.   Skin: Negative.    Neurological: Negative for dizziness, light-headedness and headaches.   Hematological: Negative.        Objective:      Physical Exam   Constitutional: He is oriented to person, place, and time. He appears well-developed and well-nourished. No distress.   HENT:   Head: Normocephalic and atraumatic.   Right Ear: External ear normal.   Left Ear: External ear normal.   Mouth/Throat: Oropharynx is clear and moist. No oropharyngeal exudate.   Eyes: Conjunctivae and EOM are normal. Pupils are equal, round, and reactive to light. Right eye exhibits no  discharge. Left eye exhibits no discharge. No scleral icterus.   Neck: Normal range of motion. Neck supple. No JVD present. No thyromegaly present.   Cardiovascular: Normal rate, regular rhythm, normal heart sounds and intact distal pulses.    No murmur heard.  Pulmonary/Chest: Effort normal and breath sounds normal. No respiratory distress. He has no wheezes. He exhibits no tenderness.   Abdominal: Soft. Bowel sounds are normal. He exhibits no mass. There is no tenderness.   Umbilical hernia.   Musculoskeletal: Normal range of motion. He exhibits no edema or tenderness.   Neurological: He is alert and oriented to person, place, and time. No cranial nerve deficit. Coordination normal.   Skin: Skin is warm and dry. He is not diaphoretic.   Psychiatric: He has a normal mood and affect. His behavior is normal. Judgment and thought content normal.   Nursing note and vitals reviewed.      Assessment:       1. Routine general medical examination at a health care facility        Plan:    1. Return to clinic in 1 year or sooner if needed.

## 2018-08-04 DIAGNOSIS — M25.561 ACUTE PAIN OF RIGHT KNEE: ICD-10-CM

## 2018-08-05 RX ORDER — OXAPROZIN 600 MG/1
600 TABLET, FILM COATED ORAL DAILY
Qty: 30 TABLET | Refills: 0 | Status: SHIPPED | OUTPATIENT
Start: 2018-08-05 | End: 2018-08-27 | Stop reason: ALTCHOICE

## 2018-08-27 DIAGNOSIS — M25.50 ARTHRALGIA, UNSPECIFIED JOINT: Primary | ICD-10-CM

## 2018-08-27 RX ORDER — METHYLPREDNISOLONE 4 MG/1
TABLET ORAL
Qty: 1 PACKAGE | Refills: 0 | Status: SHIPPED | OUTPATIENT
Start: 2018-08-27 | End: 2018-09-17

## 2018-08-27 RX ORDER — MELOXICAM 15 MG/1
15 TABLET ORAL DAILY
Qty: 30 TABLET | Refills: 1 | Status: SHIPPED | OUTPATIENT
Start: 2018-08-27 | End: 2018-10-21 | Stop reason: SDUPTHER

## 2018-10-16 ENCOUNTER — TELEPHONE (OUTPATIENT)
Dept: ORTHOPEDICS | Facility: CLINIC | Age: 63
End: 2018-10-16

## 2018-10-16 NOTE — TELEPHONE ENCOUNTER
----- Message from Jade Mccormick sent at 10/16/2018 12:23 PM CDT -----  Contact: Faith-Wife  Called requesting a return call back from Renu or Amada regarding an injection appt. Wife could be reached at [phone]

## 2018-10-16 NOTE — TELEPHONE ENCOUNTER
Spoke with  and informed her that unfortunately we did not have anything available for  on Friday 10/19. She then asked if it would be possible to schedule an appt for tomorrow. After viewing the schedule a spot was opened and I was able to book  for tomorrow at 1:30pm.

## 2018-10-17 ENCOUNTER — OFFICE VISIT (OUTPATIENT)
Dept: ORTHOPEDICS | Facility: CLINIC | Age: 63
End: 2018-10-17
Payer: COMMERCIAL

## 2018-10-17 VITALS
WEIGHT: 263.25 LBS | HEART RATE: 81 BPM | BODY MASS INDEX: 37.69 KG/M2 | HEIGHT: 70 IN | DIASTOLIC BLOOD PRESSURE: 85 MMHG | SYSTOLIC BLOOD PRESSURE: 155 MMHG

## 2018-10-17 DIAGNOSIS — M17.0 PRIMARY OSTEOARTHRITIS OF BOTH KNEES: Primary | ICD-10-CM

## 2018-10-17 PROCEDURE — 99999 PR PBB SHADOW E&M-EST. PATIENT-LVL IV: CPT | Mod: PBBFAC,,, | Performed by: NURSE PRACTITIONER

## 2018-10-17 PROCEDURE — 20610 DRAIN/INJ JOINT/BURSA W/O US: CPT | Mod: RT,S$GLB,, | Performed by: NURSE PRACTITIONER

## 2018-10-17 PROCEDURE — 99499 UNLISTED E&M SERVICE: CPT | Mod: S$GLB,,, | Performed by: NURSE PRACTITIONER

## 2018-10-17 RX ORDER — TRIAMCINOLONE ACETONIDE 40 MG/ML
80 INJECTION, SUSPENSION INTRA-ARTICULAR; INTRAMUSCULAR
Status: COMPLETED | OUTPATIENT
Start: 2018-10-17 | End: 2018-10-17

## 2018-10-17 RX ADMIN — TRIAMCINOLONE ACETONIDE 80 MG: 40 INJECTION, SUSPENSION INTRA-ARTICULAR; INTRAMUSCULAR at 03:10

## 2018-10-17 NOTE — PROGRESS NOTES
Pt with known bilateral knee djd. He has a partial knee replacement on the left, but has had cortisone injections for pain relief in the past. He would like to repeat cortisone injections for pain relief today.    Knee Injection Procedure Note    Diagnosis: bilateral knee degenerative arthritis  Indications: bilateral knee pain  Procedure Details: Verbal consent was obtained for the procedure. The injection site was identified and the skin was prepared with alcohol. The right knee was injected from an anterolateral approach with 1 ml of Kenalog and 4 ml Lidocaine and the left knee was injected from an anterolateral approach with 1 ml of kenalog and 4 ml lidocaine under sterile technique using a 22 gauge needle. The needle was removed and the area cleansed and dressed.  Complications:  Patient tolerated the procedure well.    he was advised to rest the knee today, using ice and elevation as needed for comfort and swelling.Immediate relief of the knee pain may be short lived and secondary to the lidocaine. he may have an increase in discomfort tonight followed by steady improvement over the next several days. It may take 1-2 weeks following the injection to get the full benefit of the medication.

## 2018-10-21 DIAGNOSIS — M25.50 ARTHRALGIA, UNSPECIFIED JOINT: ICD-10-CM

## 2018-10-21 RX ORDER — MELOXICAM 15 MG/1
TABLET ORAL
Qty: 30 TABLET | Refills: 1 | Status: SHIPPED | OUTPATIENT
Start: 2018-10-21 | End: 2019-01-23 | Stop reason: SDUPTHER

## 2019-01-23 DIAGNOSIS — M25.50 ARTHRALGIA, UNSPECIFIED JOINT: ICD-10-CM

## 2019-01-23 RX ORDER — MELOXICAM 15 MG/1
TABLET ORAL
Qty: 90 TABLET | Refills: 0 | Status: SHIPPED | OUTPATIENT
Start: 2019-01-23 | End: 2019-04-16 | Stop reason: SDUPTHER

## 2019-04-16 DIAGNOSIS — M25.50 ARTHRALGIA, UNSPECIFIED JOINT: ICD-10-CM

## 2019-04-16 RX ORDER — MELOXICAM 15 MG/1
TABLET ORAL
Qty: 90 TABLET | Refills: 0 | Status: SHIPPED | OUTPATIENT
Start: 2019-04-16 | End: 2019-07-14 | Stop reason: SDUPTHER

## 2019-04-26 ENCOUNTER — OFFICE VISIT (OUTPATIENT)
Dept: ORTHOPEDICS | Facility: CLINIC | Age: 64
End: 2019-04-26
Payer: COMMERCIAL

## 2019-04-26 VITALS — BODY MASS INDEX: 38.75 KG/M2 | WEIGHT: 270.63 LBS | HEIGHT: 70 IN

## 2019-04-26 DIAGNOSIS — M17.11 PRIMARY OSTEOARTHRITIS OF RIGHT KNEE: Primary | ICD-10-CM

## 2019-04-26 PROCEDURE — 3008F PR BODY MASS INDEX (BMI) DOCUMENTED: ICD-10-PCS | Mod: CPTII,S$GLB,, | Performed by: NURSE PRACTITIONER

## 2019-04-26 PROCEDURE — 20610 DRAIN/INJ JOINT/BURSA W/O US: CPT | Mod: RT,S$GLB,, | Performed by: NURSE PRACTITIONER

## 2019-04-26 PROCEDURE — 99999 PR PBB SHADOW E&M-EST. PATIENT-LVL II: ICD-10-PCS | Mod: PBBFAC,,, | Performed by: NURSE PRACTITIONER

## 2019-04-26 PROCEDURE — 99999 PR PBB SHADOW E&M-EST. PATIENT-LVL II: CPT | Mod: PBBFAC,,, | Performed by: NURSE PRACTITIONER

## 2019-04-26 PROCEDURE — 99213 PR OFFICE/OUTPT VISIT, EST, LEVL III, 20-29 MIN: ICD-10-PCS | Mod: 25,S$GLB,, | Performed by: NURSE PRACTITIONER

## 2019-04-26 PROCEDURE — 99213 OFFICE O/P EST LOW 20 MIN: CPT | Mod: 25,S$GLB,, | Performed by: NURSE PRACTITIONER

## 2019-04-26 PROCEDURE — 3008F BODY MASS INDEX DOCD: CPT | Mod: CPTII,S$GLB,, | Performed by: NURSE PRACTITIONER

## 2019-04-26 PROCEDURE — 20610 PR DRAIN/INJECT LARGE JOINT/BURSA: ICD-10-PCS | Mod: RT,S$GLB,, | Performed by: NURSE PRACTITIONER

## 2019-04-26 RX ADMIN — TRIAMCINOLONE ACETONIDE 40 MG: 40 INJECTION, SUSPENSION INTRA-ARTICULAR; INTRAMUSCULAR at 08:04

## 2019-04-26 NOTE — PROGRESS NOTES
CC: Pain of the Left Knee and Pain of the Right Knee      HPI: Pt with c/o bilateral knee pain for the past few weeks. He had a left knee unicondylar replacement by Dr. Shane in 2002. He denies any problems with the left knee until a year or so ago. The pain in the left is lateral and worse with increased activity. The right knee is actually more painful and the pain is global and aching and also worse with increased activity. He is retired, but he remains active and has to take frequent breaks due to his knee pain. He has taken prescription nsaids, including celebrex and mobic, with minimal relief, but he is now having bilateral hand and feet swelling which may be related. He would like to repeat cortisone injections for right knee pain relief today. He is ambulating without assistive device. There is not a limp.    ROS  General: denies fever and chills  Resp: no c/o sob  CVS: no c/o cp  MSK: c/o bilateral knee pain which is global and worse with activity    PE  General: AAOx3, pleasant and cooperative  Resp: respirations even and unlabored  MSK: right knee exam  -5 degrees extension  100 degrees flexion  No warmth or erythema   - effusion    Left knee exam  0 degrees extension  110 degrees flexion  No warmth or erythema  - effusion    Assessment:  Right knee djd  Left lateral knee djd    Plan:  Appt made with Dr. Schultz to discuss possible left knee revision arthroplasty and right total knee replacement  Cortisone injection right knee today. He understands the risk of infection with injection in the left knee, so that is deferred  RICE  Follow up with primary care to discuss bilateral hand and foot swelling. Stop nsaids for now and see if there is improvement in the swelling  Tylenol prn for now    Knee Injection Procedure Note    Diagnosis: right knee degenerative arthritis  Indications: right knee pain  Procedure Details: Verbal consent was obtained for the procedure. The injection site was identified and the  skin was prepared with alcohol. The right knee was injected from an anterolateral approach with 1 ml of Kenalog and 4 ml Lidocaine under sterile technique using a 22 gauge needle. The needle was removed and the area cleansed and dressed.  Complications:  Patient tolerated the procedure well.    he was advised to rest the knee today, using ice and elevation as needed for comfort and swelling.Immediate relief of the knee pain may be short lived and secondary to the lidocaine. he may have an increase in discomfort tonight followed by steady improvement over the next several days. It may take 1-2 weeks following the injection to get the full benefit of the medication.

## 2019-04-28 RX ORDER — TRIAMCINOLONE ACETONIDE 40 MG/ML
40 INJECTION, SUSPENSION INTRA-ARTICULAR; INTRAMUSCULAR ONCE
Status: COMPLETED | OUTPATIENT
Start: 2019-04-26 | End: 2019-04-26

## 2019-05-09 DIAGNOSIS — Z00.00 ROUTINE GENERAL MEDICAL EXAMINATION AT A HEALTH CARE FACILITY: Primary | ICD-10-CM

## 2019-06-13 DIAGNOSIS — M17.0 PRIMARY OSTEOARTHRITIS OF BOTH KNEES: Primary | ICD-10-CM

## 2019-06-17 ENCOUNTER — OFFICE VISIT (OUTPATIENT)
Dept: ORTHOPEDICS | Facility: CLINIC | Age: 64
End: 2019-06-17
Payer: COMMERCIAL

## 2019-06-17 ENCOUNTER — HOSPITAL ENCOUNTER (OUTPATIENT)
Dept: RADIOLOGY | Facility: HOSPITAL | Age: 64
Discharge: HOME | End: 2019-06-17
Attending: ORTHOPAEDIC SURGERY
Payer: COMMERCIAL

## 2019-06-17 VITALS — WEIGHT: 262.69 LBS | BODY MASS INDEX: 37.61 KG/M2 | HEIGHT: 70 IN

## 2019-06-17 DIAGNOSIS — M17.0 PRIMARY OSTEOARTHRITIS OF BOTH KNEES: ICD-10-CM

## 2019-06-17 DIAGNOSIS — M17.11 PRIMARY OSTEOARTHRITIS OF RIGHT KNEE: Primary | ICD-10-CM

## 2019-06-17 PROCEDURE — 73562 XR KNEE ORTHO BILAT: ICD-10-PCS | Mod: 26,RT,, | Performed by: RADIOLOGY

## 2019-06-17 PROCEDURE — 99999 PR PBB SHADOW E&M-EST. PATIENT-LVL III: ICD-10-PCS | Mod: PBBFAC,,, | Performed by: ORTHOPAEDIC SURGERY

## 2019-06-17 PROCEDURE — 3008F BODY MASS INDEX DOCD: CPT | Mod: CPTII,S$GLB,, | Performed by: ORTHOPAEDIC SURGERY

## 2019-06-17 PROCEDURE — 99214 PR OFFICE/OUTPT VISIT, EST, LEVL IV, 30-39 MIN: ICD-10-PCS | Mod: S$GLB,,, | Performed by: ORTHOPAEDIC SURGERY

## 2019-06-17 PROCEDURE — 99214 OFFICE O/P EST MOD 30 MIN: CPT | Mod: S$GLB,,, | Performed by: ORTHOPAEDIC SURGERY

## 2019-06-17 PROCEDURE — 73562 X-RAY EXAM OF KNEE 3: CPT | Mod: TC,50

## 2019-06-17 PROCEDURE — 73562 X-RAY EXAM OF KNEE 3: CPT | Mod: 26,RT,, | Performed by: RADIOLOGY

## 2019-06-17 PROCEDURE — 3008F PR BODY MASS INDEX (BMI) DOCUMENTED: ICD-10-PCS | Mod: CPTII,S$GLB,, | Performed by: ORTHOPAEDIC SURGERY

## 2019-06-17 PROCEDURE — 99999 PR PBB SHADOW E&M-EST. PATIENT-LVL III: CPT | Mod: PBBFAC,,, | Performed by: ORTHOPAEDIC SURGERY

## 2019-06-17 PROCEDURE — 73562 X-RAY EXAM OF KNEE 3: CPT | Mod: 26,LT,, | Performed by: RADIOLOGY

## 2019-06-17 NOTE — LETTER
June 17, 2019      Renu Mcgee, BOB  [address]  [facility] [address]           [facility]  [address]  [facility] [address]  Phone: [phone]          Patient: Elmer Borrero   MR Number: [id number]   YOB: 1955   Date of Visit: 6/17/2019       Dear Renu Mcgee:    Thank you for referring Elmer Borrero to me for evaluation. Attached you will find relevant portions of my assessment and plan of care.    If you have questions, please do not hesitate to call me. I look forward to following Elmer Borrero along with you.    Sincerely,    Arturo Schultz MD    Enclosure  CC:  No Recipients    If you would like to receive this communication electronically, please contact [email or web address] or [phone] to request more information on [facility] Link access.    For providers and/or their staff who would like to refer a patient to [facility], please contact us through our one-stop-shop provider referral line, [facility] , at [phone].    If you feel you have received this communication in error or would no longer like to receive these types of communications, please e-mail [email or web address]

## 2019-06-17 NOTE — PROGRESS NOTES
Subjective:      Patient ID: Elmer Borrero is a 63 y.o. male.    Chief Complaint: Pain of the Left Knee and Pain of the Right Knee    HPI     Elmer Borrero is a 63 year old female here with a 2 year history of bilateral knee pain, R>L. The patient is a  Retiree. There was not a history of trauma.  The pain is severe The pain is located in the medial, global aspect of the knee. There is is not radiation.   There is not catching or locking.   The pain is described as sharp. The patient has had prior surgery. Partial meniscectomy with Dr. Gilman 9/2009 He had a left knee unicondylar replacement by Dr. Shane in 2002 It is aggravated by standing and walking.  It is alleviated by rest. There is not numbness or tingling of the lower extremity.  There is not back pain.  He  has tried medications or injections. They have helped, last CSI right knee 4/28/19 with some pain relief.  He does have difficulty getting in or out of a car, getting dressed, or going up or down stairs.  The patient does not use an assistive device.     Past Medical History:   Diagnosis Date    Actinic keratosis     Arthritis     BPH (benign prostatic hyperplasia)     Cataract     Hyperlipidemia     Hypertension     Kidney stone     GEOVANNA on CPAP     Renal calculi     Subclinical hypothyroidism        Current Outpatient Medications on File Prior to Visit   Medication Sig Dispense Refill    allopurinol (ZYLOPRIM) 300 MG tablet TAKE 1 TABLET ONCE DAILY 90 tablet 1    aspirin (ECOTRIN) 81 MG EC tablet Take 81 mg by mouth once daily.      cyclobenzaprine (FLEXERIL) 10 MG tablet TAKE 1 TABLET BY MOUTH 3 TIMES A DAY AS NEEDED FOR MUSCLE SPASMS 45 tablet 1    diclofenac (VOLTAREN) 75 MG EC tablet Take 1 tablet (75 mg total) by mouth 2 (two) times daily as needed. Pt may alternate with other nsaid every other week 180 tablet 0    finasteride (PROSCAR) 5 mg tablet TAKE 1 TABLET BY MOUTH EVERY DAY 90 tablet 1    loratadine (CLARITIN)  10 mg tablet Take 1 tablet by mouth daily as needed.      montelukast (SINGULAIR) 10 mg tablet TAKE 1 TABLET EVERY EVENING 90 tablet 1    multivitamin (THERAGRAN) per tablet Take 1 tablet by mouth once daily.      tamsulosin (FLOMAX) 0.4 mg Cap TAKE 1 CAPSULE DAILY 90 capsule 1    telmisartan-hydrochlorothiazide (MICARDIS HCT) 80-25 mg per tablet Take 1 tablet by mouth once daily. 90 tablet 3    diphenhydrAMINE (SOMINEX) 25 mg tablet Take 50 mg by mouth nightly as needed for Insomnia.      KRILL OIL ORAL Take 2,000 mg by mouth once daily at 6am.       meloxicam (MOBIC) 15 MG tablet TAKE 1 TABLET BY MOUTH EVERY DAY 90 tablet 0     Current Facility-Administered Medications on File Prior to Visit   Medication Dose Route Frequency Provider Last Rate Last Dose    EUFLEXXA injection Syrg 20 mg  20 mg Intra-articular Weekly Renu Mcgee NP   20 mg at 10/06/16 1049    triamcinolone acetonide injection 40 mg  40 mg Intramuscular 1 time in Clinic/HOD Daphne Kerns NP           Past Surgical History:   Procedure Laterality Date    COLONOSCOPY N/A 9/8/2015    Performed by [facility] at [facility]O (4TH FLR)    JOINT REPLACEMENT      left knee    KNEE SURGERY      SPINE SURGERY         Family History   Problem Relation Age of Onset    Heart disease Father     Hypertension Father     Cancer Paternal Grandfather         Prostate cancer    Hyperlipidemia Mother     Hypertension Mother     Hypertension Sister     Heart disease Brother        Social History     Socioeconomic History    Marital status:      Spouse name: Not on file    Number of children: 2    Years of education: Not on file    Highest education level: Not on file   Occupational History    Not on file   Social Needs    Financial resource strain: Not on file    Food insecurity:     Worry: Not on file     Inability: Not on file    Transportation needs:     Medical: Not on file     Non-medical: Not on file   Tobacco Use     Smoking status: Never Smoker    Smokeless tobacco: Never Used   Substance and Sexual Activity    Alcohol use: Yes     Comment: 16 beers/week 2 glasses of wine/day    Drug use: No    Sexual activity: Not on file   Lifestyle    Physical activity:     Days per week: Not on file     Minutes per session: Not on file    Stress: Not on file   Relationships    Social connections:     Talks on phone: Not on file     Gets together: Not on file     Attends Restoration service: Not on file     Active member of club or organization: Not on file     Attends meetings of clubs or organizations: Not on file     Relationship status: Not on file   Other Topics Concern    Not on file   Social History Narrative    Not on file         Past Medical History:   Diagnosis Date    Actinic keratosis     Arthritis     BPH (benign prostatic hyperplasia)     Cataract     Hyperlipidemia     Hypertension     Kidney stone     GEOVANNA on CPAP     Renal calculi     Subclinical hypothyroidism      Past Surgical History:   Procedure Laterality Date    COLONOSCOPY N/A 9/8/2015    [facility] ([facility] F[facility])    JOINT REPLACEMENT      left knee    KNEE SURGERY      SPINE SURGERY       Family History   Problem Relation Age of Onset    Heart disease Father     Hypertension Father     Cancer Paternal Grandfather         Prostate cancer    Hyperlipidemia Mother     Hypertension Mother     Hypertension Sister     Heart disease Brother      Social History     Socioeconomic History    Marital status:      Spouse name: Not on file    Number of children: 2    Years of education: Not on file    Highest education level: Not on file   Occupational History    Not on file   Social Needs    Financial resource strain: Not on file    Food insecurity:     Worry: Not on file     Inability: Not on file    Transportation needs:     Medical: Not on file     Non-medical: Not on file   Tobacco Use    Smoking  status: Never Smoker    Smokeless tobacco: Never Used   Substance and Sexual Activity    Alcohol use: Yes     Comment: 16 beers/week 2 glasses of wine/day    Drug use: No    Sexual activity: Not on file   Lifestyle    Physical activity:     Days per week: Not on file     Minutes per session: Not on file    Stress: Not on file   Relationships    Social connections:     Talks on phone: Not on file     Gets together: Not on file     Attends Taoist service: Not on file     Active member of club or organization: Not on file     Attends meetings of clubs or organizations: Not on file     Relationship status: Not on file   Other Topics Concern    Not on file   Social History Narrative    Not on file     Current Outpatient Medications on File Prior to Visit   Medication Sig Dispense Refill    allopurinol (ZYLOPRIM) 300 MG tablet TAKE 1 TABLET ONCE DAILY 90 tablet 1    aspirin (ECOTRIN) 81 MG EC tablet Take 81 mg by mouth once daily.      cyclobenzaprine (FLEXERIL) 10 MG tablet TAKE 1 TABLET BY MOUTH 3 TIMES A DAY AS NEEDED FOR MUSCLE SPASMS 45 tablet 1    diclofenac (VOLTAREN) 75 MG EC tablet Take 1 tablet (75 mg total) by mouth 2 (two) times daily as needed. Pt may alternate with other nsaid every other week 180 tablet 0    finasteride (PROSCAR) 5 mg tablet TAKE 1 TABLET BY MOUTH EVERY DAY 90 tablet 1    loratadine (CLARITIN) 10 mg tablet Take 1 tablet by mouth daily as needed.      montelukast (SINGULAIR) 10 mg tablet TAKE 1 TABLET EVERY EVENING 90 tablet 1    multivitamin (THERAGRAN) per tablet Take 1 tablet by mouth once daily.      tamsulosin (FLOMAX) 0.4 mg Cap TAKE 1 CAPSULE DAILY 90 capsule 1    telmisartan-hydrochlorothiazide (MICARDIS HCT) 80-25 mg per tablet Take 1 tablet by mouth once daily. 90 tablet 3    diphenhydrAMINE (SOMINEX) 25 mg tablet Take 50 mg by mouth nightly as needed for Insomnia.      KRILL OIL ORAL Take 2,000 mg by mouth once daily at 6am.       meloxicam (MOBIC) 15 MG  "tablet TAKE 1 TABLET BY MOUTH EVERY DAY 90 tablet 0     Current Facility-Administered Medications on File Prior to Visit   Medication Dose Route Frequency Provider Last Rate Last Dose    EUFLEXXA injection Syrg 20 mg  20 mg Intra-articular Weekly Renu Mcgee NP   20 mg at 10/06/16 1049    triamcinolone acetonide injection 40 mg  40 mg Intramuscular 1 time in Clinic/HOD Daphne Kerns NP         Review of patient's allergies indicates:   Allergen Reactions    Ace inhibitors Other (See Comments)     cough       Review of Systems   Constitution: Negative for chills, fever and night sweats.   HENT: Negative for hearing loss.    Eyes: Negative for blurred vision and double vision.   Cardiovascular: Negative for chest pain, claudication and leg swelling.   Respiratory: Negative for shortness of breath.    Endocrine: Negative for polydipsia, polyphagia and polyuria.   Hematologic/Lymphatic: Negative for adenopathy and bleeding problem. Does not bruise/bleed easily.   Skin: Negative for poor wound healing.   Gastrointestinal: Negative for diarrhea and heartburn.   Genitourinary: Negative for bladder incontinence.   Neurological: Negative for focal weakness, headaches, numbness, paresthesias and sensory change.   Psychiatric/Behavioral: The patient is not nervous/anxious.    Allergic/Immunologic: Negative for persistent infections.         Objective:      Body mass index is 37.69 kg/m².  Vitals:    06/17/19 0932   Weight: 119.2 kg (262 lb 10.9 oz)   Height: 5' 10" (1.778 m)         General    Constitutional: He is oriented to person, place, and time. He appears well-developed and well-nourished.   HENT:   Head: Normocephalic and atraumatic.   Eyes: EOM are normal.   Cardiovascular: Normal rate.    Pulmonary/Chest: Effort normal.   Neurological: He is alert and oriented to person, place, and time.   Psychiatric: He has a normal mood and affect. His behavior is normal.     General Musculoskeletal Exam   Gait: normal "       Right Knee Exam     Inspection   Erythema: absent  Scars: absent  Swelling: absent  Effusion: absent  Deformity: present (fixed varus)  Bruising: absent    Tenderness   The patient is tender to palpation of the medial joint line.    Crepitus   The patient has crepitus of the patella and medial joint line.    Range of Motion   Extension: 5   Flexion: 120     Tests   Ligament Examination Lachman: normal (-1 to 2mm)   MCL - Valgus: normal (0 to 2mm)  LCL - Varus: normal  Patella   Passive Patellar Tilt: neutral    Other   Sensation: normal    Left Knee Exam     Inspection   Erythema: absent  Scars: present  Swelling: absent  Effusion: absent  Deformity: absent  Bruising: absent    Tenderness   The patient is experiencing no tenderness.     Range of Motion   Extension: 0   Flexion: 120     Tests   Stability Lachman: normal (-1 to 2mm)   MCL - Valgus: normal (0 to 2mm)  LCL - Varus: normal (0 to 2mm)  Patella   Passive Patellar Tilt: neutral    Other   Sensation: normal    Muscle Strength   Right Lower Extremity   Hip Abduction: 5/5   Quadriceps:  5/5   Hamstrin/5   Left Lower Extremity   Hip Abduction: 5/5   Quadriceps:  5/5   Hamstrin/5     Reflexes     Left Side  Quadriceps:  2+    Right Side   Quadriceps:  2+    Vascular Exam     Right Pulses  Dorsalis Pedis:      2+          Left Pulses  Dorsalis Pedis:      2+          Edema  Right Lower Leg: absent  Left Lower Leg: absent               Radiographs taken today and reviewed by me demonstrate severe arthritic change of the right KNEE(s).There  is bone destruction.  There is not a fracture. The medial compartment is most involved.  There is a varus deformity of right knee. .  The changes are tricompartmental.  Left knee with medial UKA . Varus.          Assessment:       Encounter Diagnosis   Name Primary?    Primary osteoarthritis of right knee Yes          Plan:       Elmer was seen today for pain and pain.    Diagnoses and all orders for this  visit:    Primary osteoarthritis of right knee      Treatment options were discussed. The surgical process of right knee replacement was discussed in detail with the patient including a detailed discussion of the procedure itself (including visual model, x-ray review, and literature review). The typical perioperative and post-operative course was discussed and perioperative risks were discussed to the patient's satisfaction.  Risks and complications discussed included but were not limited to the risks of anesthetic complications, infection, bleeding, wound healing complications, stiffness, aseptic loosening, instability, limb length inequality, neurologic dysfunction including numbness and weakness, additional surgery,  DVT, pulmonary embolism, perioperative medical risks (cardiac, pulmonary, renal, neurologic), and death and the patient elects to proceed.  The patient should get medically cleared and attend the joint seminar.      ASAS for DVT prophylaxis        Same Day protocol

## 2019-06-18 DIAGNOSIS — M17.11 PRIMARY OSTEOARTHRITIS OF RIGHT KNEE: Primary | ICD-10-CM

## 2019-07-09 ENCOUNTER — HOSPITAL ENCOUNTER (OUTPATIENT)
Dept: CARDIOLOGY | Facility: CLINIC | Age: 64
Discharge: HOME | End: 2019-07-09
Payer: COMMERCIAL

## 2019-07-09 ENCOUNTER — OFFICE VISIT (OUTPATIENT)
Dept: INTERNAL MEDICINE | Facility: CLINIC | Age: 64
End: 2019-07-09
Payer: COMMERCIAL

## 2019-07-09 ENCOUNTER — CLINICAL SUPPORT (OUTPATIENT)
Dept: INTERNAL MEDICINE | Facility: CLINIC | Age: 64
End: 2019-07-09
Payer: COMMERCIAL

## 2019-07-09 VITALS
WEIGHT: 260 LBS | DIASTOLIC BLOOD PRESSURE: 78 MMHG | BODY MASS INDEX: 37.31 KG/M2 | SYSTOLIC BLOOD PRESSURE: 140 MMHG | HEART RATE: 76 BPM

## 2019-07-09 DIAGNOSIS — Z00.00 ROUTINE GENERAL MEDICAL EXAMINATION AT A HEALTH CARE FACILITY: Primary | ICD-10-CM

## 2019-07-09 DIAGNOSIS — Z00.00 ANNUAL PHYSICAL EXAM: Primary | ICD-10-CM

## 2019-07-09 DIAGNOSIS — Z00.00 ROUTINE GENERAL MEDICAL EXAMINATION AT A HEALTH CARE FACILITY: ICD-10-CM

## 2019-07-09 LAB
25(OH)D3+25(OH)D2 SERPL-MCNC: 39 NG/ML (ref 30–96)
ALBUMIN SERPL BCP-MCNC: 3.9 G/DL (ref 3.5–5.2)
ALP SERPL-CCNC: 65 U/L (ref 55–135)
ALT SERPL W/O P-5'-P-CCNC: 51 U/L (ref 10–44)
ANION GAP SERPL CALC-SCNC: 11 MMOL/L (ref 8–16)
AST SERPL-CCNC: 28 U/L (ref 10–40)
BILIRUB SERPL-MCNC: 0.4 MG/DL (ref 0.1–1)
BUN SERPL-MCNC: 18 MG/DL (ref 8–23)
CALCIUM SERPL-MCNC: 9.6 MG/DL (ref 8.7–10.5)
CHLORIDE SERPL-SCNC: 99 MMOL/L (ref 95–110)
CHOLEST SERPL-MCNC: 209 MG/DL (ref 120–199)
CHOLEST/HDLC SERPL: 4.4 {RATIO} (ref 2–5)
CO2 SERPL-SCNC: 28 MMOL/L (ref 23–29)
COMPLEXED PSA SERPL-MCNC: 0.82 NG/ML (ref 0–4)
CREAT SERPL-MCNC: 1 MG/DL (ref 0.5–1.4)
ERYTHROCYTE [DISTWIDTH] IN BLOOD BY AUTOMATED COUNT: 12.7 % (ref 11.5–14.5)
EST. GFR  (AFRICAN AMERICAN): >60 ML/MIN/1.73 M^2
EST. GFR  (NON AFRICAN AMERICAN): >60 ML/MIN/1.73 M^2
ESTIMATED AVG GLUCOSE: 105 MG/DL (ref 68–131)
GLUCOSE SERPL-MCNC: 108 MG/DL (ref 70–110)
HBA1C MFR BLD HPLC: 5.3 % (ref 4–5.6)
HCT VFR BLD AUTO: 45 % (ref 40–54)
HDLC SERPL-MCNC: 48 MG/DL (ref 40–75)
HDLC SERPL: 23 % (ref 20–50)
HGB BLD-MCNC: 15.5 G/DL (ref 14–18)
LDLC SERPL CALC-MCNC: 127.6 MG/DL (ref 63–159)
MCH RBC QN AUTO: 32.8 PG (ref 27–31)
MCHC RBC AUTO-ENTMCNC: 34.4 G/DL (ref 32–36)
MCV RBC AUTO: 95 FL (ref 82–98)
NONHDLC SERPL-MCNC: 161 MG/DL
PLATELET # BLD AUTO: 268 K/UL (ref 150–350)
PMV BLD AUTO: 10 FL (ref 9.2–12.9)
POTASSIUM SERPL-SCNC: 3.5 MMOL/L (ref 3.5–5.1)
PROT SERPL-MCNC: 7.9 G/DL (ref 6–8.4)
RBC # BLD AUTO: 4.72 M/UL (ref 4.6–6.2)
SODIUM SERPL-SCNC: 138 MMOL/L (ref 136–145)
T4 FREE SERPL-MCNC: 0.92 NG/DL (ref 0.71–1.51)
TRIGL SERPL-MCNC: 167 MG/DL (ref 30–150)
TSH SERPL DL<=0.005 MIU/L-ACNC: 4.95 UIU/ML (ref 0.4–4)
WBC # BLD AUTO: 6.01 K/UL (ref 3.9–12.7)

## 2019-07-09 PROCEDURE — 84439 ASSAY OF FREE THYROXINE: CPT

## 2019-07-09 PROCEDURE — 85027 COMPLETE CBC AUTOMATED: CPT

## 2019-07-09 PROCEDURE — 84443 ASSAY THYROID STIM HORMONE: CPT

## 2019-07-09 PROCEDURE — 99999 PR PBB SHADOW E&M-EST. PATIENT-LVL III: ICD-10-PCS | Mod: PBBFAC,,, | Performed by: INTERNAL MEDICINE

## 2019-07-09 PROCEDURE — 93010 EKG 12-LEAD: ICD-10-PCS | Mod: S$PBB,,, | Performed by: INTERNAL MEDICINE

## 2019-07-09 PROCEDURE — 97802 MEDICAL NUTRITION INDIV IN: CPT | Mod: S$GLB,,, | Performed by: INTERNAL MEDICINE

## 2019-07-09 PROCEDURE — 99386 PR PREVENTIVE VISIT,NEW,40-64: ICD-10-PCS | Mod: S$GLB,,, | Performed by: INTERNAL MEDICINE

## 2019-07-09 PROCEDURE — 97802 PR MED NUTR THER, 1ST, INDIV, EA 15 MIN: ICD-10-PCS | Mod: S$GLB,,, | Performed by: INTERNAL MEDICINE

## 2019-07-09 PROCEDURE — 3078F DIAST BP <80 MM HG: CPT | Mod: CPTII,S$GLB,, | Performed by: INTERNAL MEDICINE

## 2019-07-09 PROCEDURE — 97750 PR PHYSICAL PERFORMANCE TEST: ICD-10-PCS | Mod: S$GLB,,, | Performed by: INTERNAL MEDICINE

## 2019-07-09 PROCEDURE — 3078F PR MOST RECENT DIASTOLIC BLOOD PRESSURE < 80 MM HG: ICD-10-PCS | Mod: CPTII,S$GLB,, | Performed by: INTERNAL MEDICINE

## 2019-07-09 PROCEDURE — 93005 ELECTROCARDIOGRAM TRACING: CPT | Mod: PBBFAC | Performed by: INTERNAL MEDICINE

## 2019-07-09 PROCEDURE — 83036 HEMOGLOBIN GLYCOSYLATED A1C: CPT

## 2019-07-09 PROCEDURE — 80061 LIPID PANEL: CPT

## 2019-07-09 PROCEDURE — 99999 PR PBB SHADOW E&M-EST. PATIENT-LVL III: CPT | Mod: PBBFAC,,, | Performed by: INTERNAL MEDICINE

## 2019-07-09 PROCEDURE — 93010 ELECTROCARDIOGRAM REPORT: CPT | Mod: S$PBB,,, | Performed by: INTERNAL MEDICINE

## 2019-07-09 PROCEDURE — 3077F SYST BP >= 140 MM HG: CPT | Mod: CPTII,S$GLB,, | Performed by: INTERNAL MEDICINE

## 2019-07-09 PROCEDURE — 84153 ASSAY OF PSA TOTAL: CPT

## 2019-07-09 PROCEDURE — 99386 PREV VISIT NEW AGE 40-64: CPT | Mod: S$GLB,,, | Performed by: INTERNAL MEDICINE

## 2019-07-09 PROCEDURE — 82306 VITAMIN D 25 HYDROXY: CPT

## 2019-07-09 PROCEDURE — 80053 COMPREHEN METABOLIC PANEL: CPT

## 2019-07-09 PROCEDURE — 97750 PHYSICAL PERFORMANCE TEST: CPT | Mod: S$GLB,,, | Performed by: INTERNAL MEDICINE

## 2019-07-09 PROCEDURE — 3077F PR MOST RECENT SYSTOLIC BLOOD PRESSURE >= 140 MM HG: ICD-10-PCS | Mod: CPTII,S$GLB,, | Performed by: INTERNAL MEDICINE

## 2019-07-09 NOTE — PROGRESS NOTES
"Subjective:       Patient ID: Elmer Borrero is a 63 y.o. male.    Chief Complaint: No chief complaint on file.    HPI   Pt. Has no significant cardiovascular or pulmonary history.  Patient has a history of hypertension for which he takes a diuretic.      Physical Limitations:  Patient has "bad knees" and sore shoulders.  Patient will undergo a total knee replacement of his right knee in September.  Patient had a partial knee replacement of his left knee which has since "deteriorated".      Current exercise routine:  Patient does not follow any formal exercise or flexibility routine at the current time.  Patient is not able to get down on and up off the floor.  The push-up, curl-up, and sit and reach tests were deferred.      Goals:  Patient has a long term goal weight of 200 lbs and a set a year goal weight of 240 lbs.    Fun Facts:  Patient was friendly and semi-engaged.  When I asked the patient I he had implemented any of last year's recommendations, he said no.  When I asked the patient if he is interested in beginning an exercise routine, he shrugged.  Patient was semi-receptive to the recommendations made.        Review of Systems    Objective:     The fitness evaluation results are as follows:  D.O.S. 7/9/2019 7/26/2018 6/28/2017 6/30/2016 7/22/2015   Height (in): 67.5 68 68 67.2 68   Weight (lbs): 260 261 245 251 251   BMI: 40.916418 39.340060 37.895453 39.69032 38.70819   Body Fat (%): 34.35 34.26 32.62 33.50 34.80   Waist (cm): 126 124 121 125 127   Hip (cm): 119 122 115 119 117   WHR: 1.06 1.02 1.05 1.05 1.09   RBP (mmHg): 128/76 132/80 166/86 140/80 130/80   RHR (bpm): 66 70 64 64 64    Strength R (lbs)t: 50 48.009943 61.920008 70 59.15618    Strength Lt (lbs): 60 65 61.964038 71 64.09592   Push-up Assessment: Deferred deferred Deferred Deferred Deferred   Curl-up Assessment: Deferred deferred Deferred Deferred Deferred   Flexibility Testing (cm): Deferred deferred Deferred Deferred 0 "   REE (kcals): 2000 2010 6051 3453 1980       Physical Exam    Assessment:     Age/gender stratified assessment:  Resting BP: Within Normal Limits   Body Fat %: Poor   WHR Risk Factor: High Risk    Strength R: Below Average    Strength L: Average   Upper Body Endurance: Deferred   Abdominal Endurance: Deferred   Lower body Flexibiltiy: Deferred       1. Routine general medical examination at a health care facility        Plan:       Recommended fitness guidelines:    -150 minutes of moderate intensity aerobic exercise per week or 75 minutes of vigorous intensity aerobic exercise per week.  Try to reach a minimum of 150 minutes of moderate intensity aerobic activity per week by walking or biking for 30 minutes, 5 days a week.    -2 to 4 days per week of resistance training for each muscle group.      -Daily stretching with a hold of at least 30 seconds per muscle group.

## 2019-07-09 NOTE — PROGRESS NOTES
"Nutrition Assessment  Client name:  Elmer Borrero      (Annual  physical)  :  1955  Age:  63 y.o.  Gender:  male    Client states:  He is scheduled for total knee replacement  due to cartilage erosion, and has current physical limitations with his left leg and foot.  Presents with [employer] HTN pamphlet given to him by the nurse and states he will talk with Dr. Clark in appointment today. Today his BP was 142/80 and shares that he monitors it at home using a cuff device, however does not think it is accurate, and his readings are higher than what is stated in pamphlet. Since last visit was changed from Larsarten and Hydrochlorothiazide to a combination pill called Mircardis. Using CPAP machine for GEOVANNA. Lost 5# since last visit and thinks it may be due to smaller portions and has switched to olive oil as discussed shared last year. Mentions that his granddaughter bakes and he partakes of her accomplishments about once per week. When ETOH history was reviewed and I mentioned that he is drinking a significant number of calories, and asked if he could  drink less, he responded," that his what my wife says". By next visit, he would like to have more mobility with his knee, and to lose wt. And to increase activity, however he chooses not to make  any commitments to do so at this time.      Anthropometrics  Height:  5'7.5"     Weight:  260  BMI:  40.20  % Body Fat:  34.35    Clinical Signs/Symptoms  N/V/D:  none  Appetite (Good, Fair, or Poor):  good      Past Medical History:   Diagnosis Date    Actinic keratosis     Arthritis     BPH (benign prostatic hyperplasia)     Cataract     Hyperlipidemia     Hypertension     Kidney stone     GEOVANNA on CPAP     Renal calculi     Subclinical hypothyroidism        Past Surgical History:   Procedure Laterality Date    COLONOSCOPY N/A 2015    Performed by Bob Mirza MD at [facility] (4TH FLR)    JOINT REPLACEMENT      left knee    KNEE SURGERY   "    SPINE SURGERY         Medications    has a current medication list which includes the following prescription(s): allopurinol, aspirin, cyclobenzaprine, diclofenac, diphenhydramine, finasteride, krill oil, loratadine, meloxicam, montelukast, multivitamin, tamsulosin, and telmisartan-hydrochlorothiazide, and the following Facility-Administered Medications: euflexxa and triamcinolone acetonide.    Vitamins, Minerals, and/or Supplements:  Krill oil, B12, Men's MVI      Food/Medication Interactions:  Reviewed     Food Allergies or Intolerances:  none     Social History    Marital status:    Employment:  [employer] - retiree    Social History     Tobacco Use    Smoking status: Never Smoker    Smokeless tobacco: Never Used   Substance Use Topics    Alcohol use: Yes     Comment: 14 beers/week,  28 glasses of wine/wk        Lab Reports   Total Cholesterol:  209    Triglycerides:  167  HDL:  48  LDL:  127.6   Glucose:  108  HbA1c:  5.3  BP:  128/76     Food History  Breakfast:  Usually skips or 2 eggs cooked in butter, 2 biscuits, black coffee  Eats sweets prepared by granddaughter once per wk  [employer] combo pack  *Fluid intake:  Coffee, wine, beer, soda 1x/wk    Exercise History: no formal exercise plan, physical limitation on  Rt and lt knees.    Cultural/Spiritual/Personal Preferences:  None identified    Support System:  friends    State of Change:  Pre-contemplation    Barriers to Change:  Enjoys drinking and eating, dislikes exercise    Diagnosis    Class 3 obesity related to inadequate physical activity and excessive caloric intake as evidenced by BMI: 40.20 and Body fat: 34.35.    Intervention    RMR (Method:  Body Louisa):  2000 kcal  Activity Factor:  1.3  ROMAINE:  2600 - 500 = 2100    Goals:  1.  Avoid wt gain, whereas can proceed with Total knee replacement in August   2.  Maintain healthy lipids and HAIC  3.  Consider decreasing ETOH by 50%    Nutrition Education  Reviewed and explained  laboratory results and complimented client on decreased Triglyceride, HAIC and increased HDL and his reported wt. Loss. Triglyceride remains out of range, therefore shared foods that increase this number and suggested decrease. Client is known to me the previous 3 years and his motivation to improve his health is unchanged. Intentionally spoke in generalities today and when a specific question was proposed, he diverted the topic away from nutrition and fitness.When asked if he had concerns of his own mortality, he answered everyone is different. Discussed and gave examples of saturated, trans fats and healthy fats specifically to increase HDL value Suggested soft margarine i.e.: Smart Balance in place of butter, changing to 2% cheese, consuming fish 2x/wk. Replacing soda with Crystal light Pure and trial of Zevia. Mentioned that [employer] has naked chicken tenders without the breading as option.    Patient verbalized understanding of nutrition education and recommendations received.    Handouts Provided  Meal Planning Guide  Restaurant Guide  Eat Fit Shopping List  Eat Fit Janette  Fast Food Guide  Vitamin/Mineral Guide  AAND - Choosing Heart Healthy Fats    Monitoring/Evaluation    Monitor the following:  Weight  BMI  % Body Fat  Caloric intake  Labs:  Lipids/HAIC    Follow Up Plan:  Communication with referring healthcare provider is unnecessary at this time as patient presented as part of annual wellness exam.  However, will follow up with patient in 1-2 years.

## 2019-07-14 DIAGNOSIS — M25.50 ARTHRALGIA, UNSPECIFIED JOINT: ICD-10-CM

## 2019-07-15 RX ORDER — MELOXICAM 15 MG/1
TABLET ORAL
Qty: 90 TABLET | Refills: 0 | Status: SHIPPED | OUTPATIENT
Start: 2019-07-15 | End: 2019-09-03

## 2019-07-19 NOTE — PROGRESS NOTES
Subjective:       Patient ID: Elmer Borrero is a 63 y.o. male.    Chief Complaint: Executive Health    HPIPleasant gentleman form [facility] here for his Executive Health exam. Overall doing well,  bryon has been having issues with pain in his right knee 2o advanced degenerative changes. He is scheduled for right knee replacement surgery in September and is looking forward to some relief as a result of this surgery. He also reported that he recently hd issues with cellulitis/abscess of his right index finger which was lanced by his local physician and given trial of Clindamycin. He still notes some redness at the tip of that digit, but on exam, there is no proximal extension of this redness and the finger is not tender for the most part. Reassurance provided. Otherwise he is doing well.  I am sending copies of his studies including blood work that showed minimal increase in ALT at 51. Lipid profile showed mild increase in cholesterol level at 209/Triglycerides improved at 167. TSH was again slightly elevated at 4.949 with normal free T4 of 0.91. I will repeat TSH levels in 6 months for comparison and proceed from there. All other parameters were within normal limits including EKG.  Review of Systems   All other systems reviewed and are negative.      Objective:      Physical Exam   Constitutional: He is oriented to person, place, and time. He appears well-developed and well-nourished. No distress.   HENT:   Head: Normocephalic and atraumatic.   Right Ear: External ear normal.   Left Ear: External ear normal.   Mouth/Throat: Oropharynx is clear and moist. No oropharyngeal exudate.   Eyes: Pupils are equal, round, and reactive to light. Conjunctivae and EOM are normal. Right eye exhibits no discharge. Left eye exhibits no discharge. No scleral icterus.   Neck: Normal range of motion. Neck supple. No JVD present. No thyromegaly present.   Cardiovascular: Normal rate, regular rhythm, normal heart sounds and  intact distal pulses.   No murmur heard.  Pulmonary/Chest: Effort normal and breath sounds normal. No respiratory distress. He has no wheezes.   Abdominal: Soft. Bowel sounds are normal. He exhibits no distension and no mass. There is no tenderness.   Musculoskeletal: Normal range of motion. He exhibits no edema or tenderness.   R knee brace in place.   Lymphadenopathy:     He has no cervical adenopathy.   Neurological: He is alert and oriented to person, place, and time. No cranial nerve deficit. Coordination normal.   Skin: Skin is warm and dry. He is not diaphoretic.   Psychiatric: He has a normal mood and affect. His behavior is normal. Judgment and thought content normal.   Vitals reviewed.      Assessment:       1. Routine general medical examination at a health care facility      2.    Degenerative arthritis of knees - scheduled for R TKA.    3.    Subclinical hypothyroidism.    Plan:    1. Repeat TSH level in 6 months.         2. Continue with current medications.         3. Return to clinic as indicated above and in 1 year for annual exam.

## 2019-08-06 DIAGNOSIS — M79.604 PAIN OF RIGHT LOWER EXTREMITY: ICD-10-CM

## 2019-08-06 DIAGNOSIS — Z01.818 PRE-OP TESTING: Primary | ICD-10-CM

## 2019-08-08 ENCOUNTER — OFFICE VISIT (OUTPATIENT)
Dept: ORTHOPEDICS | Facility: CLINIC | Age: 64
End: 2019-08-08
Payer: COMMERCIAL

## 2019-08-08 ENCOUNTER — HOSPITAL ENCOUNTER (OUTPATIENT)
Dept: RADIOLOGY | Facility: HOSPITAL | Age: 64
Discharge: HOME | End: 2019-08-08
Attending: NURSE PRACTITIONER
Payer: COMMERCIAL

## 2019-08-08 VITALS — WEIGHT: 259.94 LBS | BODY MASS INDEX: 37.21 KG/M2 | HEIGHT: 70 IN

## 2019-08-08 DIAGNOSIS — Z96.659 S/P KNEE REPLACEMENT: ICD-10-CM

## 2019-08-08 DIAGNOSIS — M17.11 PRIMARY OSTEOARTHRITIS OF RIGHT KNEE: ICD-10-CM

## 2019-08-08 DIAGNOSIS — M17.11 PRIMARY OSTEOARTHRITIS OF RIGHT KNEE: Primary | ICD-10-CM

## 2019-08-08 PROCEDURE — 99999 PR PBB SHADOW E&M-EST. PATIENT-LVL III: CPT | Mod: PBBFAC,,, | Performed by: NURSE PRACTITIONER

## 2019-08-08 PROCEDURE — 99999 PR PBB SHADOW E&M-EST. PATIENT-LVL III: ICD-10-PCS | Mod: PBBFAC,,, | Performed by: NURSE PRACTITIONER

## 2019-08-08 PROCEDURE — 99499 UNLISTED E&M SERVICE: CPT | Mod: S$GLB,,, | Performed by: NURSE PRACTITIONER

## 2019-08-08 PROCEDURE — 73560 XR KNEE 1 OR 2 VIEW RIGHT: ICD-10-PCS | Mod: 26,RT,, | Performed by: RADIOLOGY

## 2019-08-08 PROCEDURE — 73560 X-RAY EXAM OF KNEE 1 OR 2: CPT | Mod: TC,RT

## 2019-08-08 PROCEDURE — 99499 NO LOS: ICD-10-PCS | Mod: S$GLB,,, | Performed by: NURSE PRACTITIONER

## 2019-08-08 PROCEDURE — 73560 X-RAY EXAM OF KNEE 1 OR 2: CPT | Mod: 26,RT,, | Performed by: RADIOLOGY

## 2019-08-08 RX ORDER — ACETAMINOPHEN AND CODEINE PHOSPHATE 300; 30 MG/1; MG/1
1 TABLET ORAL EVERY 6 HOURS
Refills: 0 | COMMUNITY
Start: 2019-06-12 | End: 2019-08-29

## 2019-08-08 RX ORDER — MUPIROCIN 20 MG/G
OINTMENT TOPICAL 3 TIMES DAILY
Refills: 0 | COMMUNITY
Start: 2019-06-13 | End: 2019-08-29

## 2019-08-11 RX ORDER — SODIUM CHLORIDE 9 MG/ML
INJECTION, SOLUTION INTRAVENOUS
Status: CANCELLED | OUTPATIENT
Start: 2019-08-11

## 2019-08-11 RX ORDER — FAMOTIDINE 20 MG/1
20 TABLET, FILM COATED ORAL 2 TIMES DAILY
Status: CANCELLED | OUTPATIENT
Start: 2019-08-11

## 2019-08-11 RX ORDER — OXYCODONE HYDROCHLORIDE 5 MG/1
10 TABLET ORAL
Status: CANCELLED | OUTPATIENT
Start: 2019-08-11

## 2019-08-11 RX ORDER — ACETAMINOPHEN 500 MG
1000 TABLET ORAL
Status: CANCELLED | OUTPATIENT
Start: 2019-08-11

## 2019-08-11 RX ORDER — PREGABALIN 25 MG/1
75 CAPSULE ORAL NIGHTLY
Status: CANCELLED | OUTPATIENT
Start: 2019-08-11

## 2019-08-11 RX ORDER — LIDOCAINE HYDROCHLORIDE 10 MG/ML
1 INJECTION, SOLUTION EPIDURAL; INFILTRATION; INTRACAUDAL; PERINEURAL
Status: CANCELLED | OUTPATIENT
Start: 2019-08-11

## 2019-08-11 RX ORDER — BISACODYL 10 MG
10 SUPPOSITORY, RECTAL RECTAL EVERY 12 HOURS PRN
Status: CANCELLED | OUTPATIENT
Start: 2019-08-11

## 2019-08-11 RX ORDER — SODIUM CHLORIDE 0.9 % (FLUSH) 0.9 %
10 SYRINGE (ML) INJECTION
Status: CANCELLED | OUTPATIENT
Start: 2019-08-11

## 2019-08-11 RX ORDER — ACETAMINOPHEN 500 MG
1000 TABLET ORAL EVERY 6 HOURS
Status: CANCELLED | OUTPATIENT
Start: 2019-08-12 | End: 2019-08-13

## 2019-08-11 RX ORDER — MUPIROCIN 20 MG/G
1 OINTMENT TOPICAL
Status: CANCELLED | OUTPATIENT
Start: 2019-08-11

## 2019-08-11 RX ORDER — FENTANYL CITRATE 50 UG/ML
25 INJECTION, SOLUTION INTRAMUSCULAR; INTRAVENOUS EVERY 5 MIN PRN
Status: CANCELLED | OUTPATIENT
Start: 2019-08-11

## 2019-08-11 RX ORDER — CELECOXIB 100 MG/1
400 CAPSULE ORAL
Status: CANCELLED | OUTPATIENT
Start: 2019-08-11

## 2019-08-11 RX ORDER — ASPIRIN 81 MG/1
81 TABLET ORAL 2 TIMES DAILY
Status: CANCELLED | OUTPATIENT
Start: 2019-08-11

## 2019-08-11 RX ORDER — OXYCODONE HYDROCHLORIDE 5 MG/1
5 TABLET ORAL
Status: CANCELLED | OUTPATIENT
Start: 2019-08-11

## 2019-08-11 RX ORDER — ONDANSETRON 2 MG/ML
4 INJECTION INTRAMUSCULAR; INTRAVENOUS EVERY 8 HOURS PRN
Status: CANCELLED | OUTPATIENT
Start: 2019-08-11

## 2019-08-11 RX ORDER — MIDAZOLAM HYDROCHLORIDE 1 MG/ML
1 INJECTION INTRAMUSCULAR; INTRAVENOUS EVERY 5 MIN PRN
Status: CANCELLED | OUTPATIENT
Start: 2019-08-11

## 2019-08-11 RX ORDER — NALOXONE HCL 0.4 MG/ML
0.02 VIAL (ML) INJECTION
Status: CANCELLED | OUTPATIENT
Start: 2019-08-11

## 2019-08-11 RX ORDER — AMOXICILLIN 250 MG
1 CAPSULE ORAL 2 TIMES DAILY
Status: CANCELLED | OUTPATIENT
Start: 2019-08-11

## 2019-08-11 RX ORDER — SODIUM CHLORIDE 9 MG/ML
INJECTION, SOLUTION INTRAVENOUS CONTINUOUS
Status: CANCELLED | OUTPATIENT
Start: 2019-08-11 | End: 2019-08-12

## 2019-08-11 RX ORDER — PREGABALIN 25 MG/1
75 CAPSULE ORAL
Status: CANCELLED | OUTPATIENT
Start: 2019-08-11

## 2019-08-11 RX ORDER — POLYETHYLENE GLYCOL 3350 17 G/17G
17 POWDER, FOR SOLUTION ORAL DAILY
Status: CANCELLED | OUTPATIENT
Start: 2019-08-12

## 2019-08-12 PROBLEM — M25.561 KNEE PAIN, RIGHT: Status: ACTIVE | Noted: 2019-08-12

## 2019-08-12 PROBLEM — Z74.09 IMPAIRED FUNCTIONAL MOBILITY, BALANCE, GAIT, AND ENDURANCE: Status: ACTIVE | Noted: 2019-08-12

## 2019-08-12 NOTE — H&P
CC: Right knee pain    Elmer Borrero is a 63 y.o. male with a history of Right knee pain. Pain is worse with activity and weight bearing.  Patient has experienced interference of activities of daily living due to decreased range of motion and an increase in joint pain and swelling. Patient has a history of left partial knee replacement by Dr. Shane.  Patient has failed non-operative treatment including NSAIDs, corticosteroid injections, viscosupplement injections, and activity modification.  Elmer Borrero currently ambulates independently.     Relevant medical conditions of significance in perioperative period:  HTN- on medication managed by pcp  Gout- on medication managed by pcp    Past Medical History:   Diagnosis Date    Actinic keratosis     Arthritis     BPH (benign prostatic hyperplasia)     Cataract     Hyperlipidemia     Hypertension     Kidney stone     GEOVANNA on CPAP     Renal calculi     Subclinical hypothyroidism        Past Surgical History:   Procedure Laterality Date    COLONOSCOPY N/A 9/8/2015    Performed [facility] ([facility])    JOINT REPLACEMENT      left knee    KNEE SURGERY      SPINE SURGERY         Family History   Problem Relation Age of Onset    Heart disease Father     Hypertension Father     Cancer Paternal Grandfather         Prostate cancer    Hyperlipidemia Mother     Hypertension Mother     Hypertension Sister     Heart disease Brother        Review of patient's allergies indicates:   Allergen Reactions    Ace inhibitors Other (See Comments)     cough         Current Outpatient Medications:     acetaminophen-codeine 300-30mg (TYLENOL #3) 300-30 mg Tab, Take 1 tablet by mouth every 6 (six) hours., Disp: , Rfl: 0    allopurinol (ZYLOPRIM) 300 MG tablet, TAKE 1 TABLET ONCE DAILY, Disp: 90 tablet, Rfl: 1    aspirin (ECOTRIN) 81 MG EC tablet, Take 81 mg by mouth once daily., Disp: , Rfl:     cyclobenzaprine (FLEXERIL) 10 MG  tablet, TAKE 1 TABLET BY MOUTH 3 TIMES A DAY AS NEEDED FOR MUSCLE SPASMS, Disp: 45 tablet, Rfl: 1    diclofenac (VOLTAREN) 75 MG EC tablet, TAKE 1 TABLET BY MOUTH TWICE A DAY AS NEEDED. MAY ALTERNATE WITH OTHER NSAID EVERY OTHER WEEK, Disp: 180 tablet, Rfl: 0    diphenhydrAMINE (SOMINEX) 25 mg tablet, Take 50 mg by mouth nightly as needed for Insomnia., Disp: , Rfl:     finasteride (PROSCAR) 5 mg tablet, TAKE 1 TABLET BY MOUTH EVERY DAY, Disp: 90 tablet, Rfl: 1    KRILL OIL ORAL, Take 2,000 mg by mouth once daily at 6am. , Disp: , Rfl:     loratadine (CLARITIN) 10 mg tablet, Take 1 tablet by mouth daily as needed., Disp: , Rfl:     meloxicam (MOBIC) 15 MG tablet, TAKE 1 TABLET BY MOUTH EVERY DAY, Disp: 90 tablet, Rfl: 0    montelukast (SINGULAIR) 10 mg tablet, TAKE 1 TABLET EVERY EVENING, Disp: 90 tablet, Rfl: 1    multivitamin (THERAGRAN) per tablet, Take 1 tablet by mouth once daily., Disp: , Rfl:     mupirocin (BACTROBAN) 2 % ointment, 3 (three) times daily. Apply to affected area, Disp: , Rfl: 0    tamsulosin (FLOMAX) 0.4 mg Cap, TAKE 1 CAPSULE DAILY, Disp: 90 capsule, Rfl: 1    telmisartan-hydrochlorothiazide (MICARDIS HCT) 80-25 mg per tablet, Take 1 tablet by mouth once daily., Disp: 90 tablet, Rfl: 3    Current Facility-Administered Medications:     EUFLEXXA injection Syrg 20 mg, 20 mg, Intra-articular, Weekly, Renu Mcgee NP, 20 mg at 10/06/16 1049    triamcinolone acetonide injection 40 mg, 40 mg, Intramuscular, 1 time in Clinic/HOD, Daphne Kerns NP    Review of Systems:  Constitutional: no fever or chills  Eyes: no visual changes  ENT: no nasal congestion or sore throat  Respiratory: no cough or shortness of breath  Cardiovascular: no chest pain or palpitations  Gastrointestinal: no nausea or vomiting, tolerating diet  Genitourinary: no hematuria or dysuria  Integument/Breast: no rash or pruritis  Hematologic/Lymphatic: no easy bruising or lymphadenopathy  Musculoskeletal: positive  "for c/o right knee pain worse with increased activity  Neurological: no seizures or tremors  Behavioral/Psych: no auditory or visual hallucinations  Endocrine: no heat or cold intolerance    PE:  Ht 5' 10" (1.778 m)   Wt 117.9 kg (259 lb 14.8 oz)   BMI 37.29 kg/m²   General: Pleasant, cooperative, NAD   Gait: antalgic  HEENT: NCAT, sclera nonicteric   Lungs: Respirations clear bilaterally; equal and unlabored.   CV: S1S2; 2+ bilateral upper and lower extremity pulses.   Skin: Intact throughout with no rashes, erythema, or lesions  Extremities: No LE edema,  no erythema or warmth of the skin in either lower extremity.    Right knee exam:  Knee Range of Motion:normal, pain with passive range of motion  Effusion:none  Condition of skin:intact  Location of tenderness:Medial joint line   Strength:normal  Stability: stable to testing    Hip Examination:normal    Radiographs: Radiographs reveal DJD with narrowing of the medial tibiofemoral joint space.  No fracture or bone destruction identified    Knee Alignment: normal    Diagnosis: osteoarthritis Right knee    Plan: Right total knee arthroplasty    Due to the serious nature of total joint infection and the high prevalence of community acquired MRSA, vancomycin will be used perioperatively.            "

## 2019-08-12 NOTE — PROGRESS NOTES
Elmer Borrero is a 63 y.o. year old here today for a pre-operative visit in preparation for a Right total knee arthroplasty to be performed by  Dr. Schultz on 9/5/19.  he was last seen and treated in the clinic on 6/17/2019. he will be medically optimized by the pre op center. There has been no significant change in medical status since last visit. No fever, chills, malaise, or unexplained weight change.      Allergies, Medications, past medical and surgical history reviewed.    Focused examination performed.    Patient declined to see Dr. Schultz today in clinic. All questions answered. Patient encouraged to call with questions. Contact information given.     Pre, acacia, and post operative procedures and expectations discussed. Questions were answered. Elmer Borrero has been educated and is ready to proceed with surgery. Approximately 30 minutes was spent discussing surgical outcomes, plans, procedures pre, acacia, and post operative expections and care.  Surgical consent signed.    Elmer Duboisnayder will contact us if there are any questions, concerns, or changes in medical status prior to surgery.

## 2019-08-12 NOTE — H&P (VIEW-ONLY)
CC: Right knee pain    Elmer Borrero is a 63 y.o. male with a history of Right knee pain. Pain is worse with activity and weight bearing.  Patient has experienced interference of activities of daily living due to decreased range of motion and an increase in joint pain and swelling. Patient has a history of left partial knee replacement by Dr. Shane.  Patient has failed non-operative treatment including NSAIDs, corticosteroid injections, viscosupplement injections, and activity modification.  Elmer Borrero currently ambulates independently.     Relevant medical conditions of significance in perioperative period:  HTN- on medication managed by pcp  Gout- on medication managed by pcp    Past Medical History:   Diagnosis Date    Actinic keratosis     Arthritis     BPH (benign prostatic hyperplasia)     Cataract     Hyperlipidemia     Hypertension     Kidney stone     GEOVANNA on CPAP     Renal calculi     Subclinical hypothyroidism        Past Surgical History:   Procedure Laterality Date    COLONOSCOPY N/A 9/8/2015    Performed by [facility] at [facility] (4TH FLR)    JOINT REPLACEMENT      left knee    KNEE SURGERY      SPINE SURGERY         Family History   Problem Relation Age of Onset    Heart disease Father     Hypertension Father     Cancer Paternal Grandfather         Prostate cancer    Hyperlipidemia Mother     Hypertension Mother     Hypertension Sister     Heart disease Brother        Review of patient's allergies indicates:   Allergen Reactions    Ace inhibitors Other (See Comments)     cough         Current Outpatient Medications:     acetaminophen-codeine 300-30mg (TYLENOL #3) 300-30 mg Tab, Take 1 tablet by mouth every 6 (six) hours., Disp: , Rfl: 0    allopurinol (ZYLOPRIM) 300 MG tablet, TAKE 1 TABLET ONCE DAILY, Disp: 90 tablet, Rfl: 1    aspirin (ECOTRIN) 81 MG EC tablet, Take 81 mg by mouth once daily., Disp: , Rfl:     cyclobenzaprine (FLEXERIL) 10 MG  tablet, TAKE 1 TABLET BY MOUTH 3 TIMES A DAY AS NEEDED FOR MUSCLE SPASMS, Disp: 45 tablet, Rfl: 1    diclofenac (VOLTAREN) 75 MG EC tablet, TAKE 1 TABLET BY MOUTH TWICE A DAY AS NEEDED. MAY ALTERNATE WITH OTHER NSAID EVERY OTHER WEEK, Disp: 180 tablet, Rfl: 0    diphenhydrAMINE (SOMINEX) 25 mg tablet, Take 50 mg by mouth nightly as needed for Insomnia., Disp: , Rfl:     finasteride (PROSCAR) 5 mg tablet, TAKE 1 TABLET BY MOUTH EVERY DAY, Disp: 90 tablet, Rfl: 1    KRILL OIL ORAL, Take 2,000 mg by mouth once daily at 6am. , Disp: , Rfl:     loratadine (CLARITIN) 10 mg tablet, Take 1 tablet by mouth daily as needed., Disp: , Rfl:     meloxicam (MOBIC) 15 MG tablet, TAKE 1 TABLET BY MOUTH EVERY DAY, Disp: 90 tablet, Rfl: 0    montelukast (SINGULAIR) 10 mg tablet, TAKE 1 TABLET EVERY EVENING, Disp: 90 tablet, Rfl: 1    multivitamin (THERAGRAN) per tablet, Take 1 tablet by mouth once daily., Disp: , Rfl:     mupirocin (BACTROBAN) 2 % ointment, 3 (three) times daily. Apply to affected area, Disp: , Rfl: 0    tamsulosin (FLOMAX) 0.4 mg Cap, TAKE 1 CAPSULE DAILY, Disp: 90 capsule, Rfl: 1    telmisartan-hydrochlorothiazide (MICARDIS HCT) 80-25 mg per tablet, Take 1 tablet by mouth once daily., Disp: 90 tablet, Rfl: 3    Current Facility-Administered Medications:     EUFLEXXA injection Syrg 20 mg, 20 mg, Intra-articular, Weekly, Renu Mcgee NP, 20 mg at 10/06/16 1049    triamcinolone acetonide injection 40 mg, 40 mg, Intramuscular, 1 time in Clinic/HOD, Daphne Kerns NP    Review of Systems:  Constitutional: no fever or chills  Eyes: no visual changes  ENT: no nasal congestion or sore throat  Respiratory: no cough or shortness of breath  Cardiovascular: no chest pain or palpitations  Gastrointestinal: no nausea or vomiting, tolerating diet  Genitourinary: no hematuria or dysuria  Integument/Breast: no rash or pruritis  Hematologic/Lymphatic: no easy bruising or lymphadenopathy  Musculoskeletal: positive  "for c/o right knee pain worse with increased activity  Neurological: no seizures or tremors  Behavioral/Psych: no auditory or visual hallucinations  Endocrine: no heat or cold intolerance    PE:  Ht 5' 10" (1.778 m)   Wt 117.9 kg (259 lb 14.8 oz)   BMI 37.29 kg/m²   General: Pleasant, cooperative, NAD   Gait: antalgic  HEENT: NCAT, sclera nonicteric   Lungs: Respirations clear bilaterally; equal and unlabored.   CV: S1S2; 2+ bilateral upper and lower extremity pulses.   Skin: Intact throughout with no rashes, erythema, or lesions  Extremities: No LE edema,  no erythema or warmth of the skin in either lower extremity.    Right knee exam:  Knee Range of Motion:normal, pain with passive range of motion  Effusion:none  Condition of skin:intact  Location of tenderness:Medial joint line   Strength:normal  Stability: stable to testing    Hip Examination:normal    Radiographs: Radiographs reveal DJD with narrowing of the medial tibiofemoral joint space.  No fracture or bone destruction identified    Knee Alignment: normal    Diagnosis: osteoarthritis Right knee    Plan: Right total knee arthroplasty    Due to the serious nature of total joint infection and the high prevalence of community acquired MRSA, vancomycin will be used perioperatively.            "

## 2019-08-19 ENCOUNTER — TELEPHONE (OUTPATIENT)
Dept: ORTHOPEDICS | Facility: CLINIC | Age: 64
End: 2019-08-19

## 2019-08-19 NOTE — TELEPHONE ENCOUNTER
Spoke to pt, informed his Iovera procedure has been scheduled for 8/26 at 0745 am, provided location and answered questions. Pt verbalized understanding.

## 2019-08-20 ENCOUNTER — TELEPHONE (OUTPATIENT)
Dept: ORTHOPEDICS | Facility: CLINIC | Age: 64
End: 2019-08-20

## 2019-08-20 ENCOUNTER — TELEPHONE (OUTPATIENT)
Dept: PREADMISSION TESTING | Facility: HOSPITAL | Age: 64
End: 2019-08-20

## 2019-08-20 NOTE — TELEPHONE ENCOUNTER
----- Message from Anaid Simms LPN sent at 8/16/2019 10:19 AM CDT -----  9/5/19-Ortho (arthroplasty knee) Patient needs BMP,PT/INR,OPOC and POC appt.

## 2019-08-20 NOTE — TELEPHONE ENCOUNTER
----- Message from Elías Trotter sent at 8/20/2019  8:35 AM CDT -----  Contact: Wife - Faith  Needs Advice    Reason for call: Wife ask for a call in regards to the patient's procedure time        Communication Preference: [phone]    Additional Information: n/a

## 2019-08-20 NOTE — TELEPHONE ENCOUNTER
Spoke to pt, he states he would like to reschedule the Iovera Appointment. He states his will reschedule as he is driving. Informed him that I will have the schedulers call his wife to reschedule. Pt pleased and verbalized understanding.

## 2019-08-23 ENCOUNTER — ANESTHESIA EVENT (OUTPATIENT)
Dept: SURGERY | Facility: HOSPITAL | Age: 64
End: 2019-08-23
Payer: MEDICARE

## 2019-08-23 NOTE — PRE ADMISSION SCREENING
Anesthesia Assessment: Preoperative EQUATION    Planned Procedure: Procedure(s) (LRB):  ARTHROPLASTY, KNEE, TOTAL-VINCENZO-SAME DAY (Right)  Requested Anesthesia Type:Regional  Surgeon: Arturo Schultz MD  Service: Orthopedics  Known or anticipated Date of Surgery:9/5/2019    Surgeon notes: reviewed; [place] 6/17/19    Previous anesthesia records: General 9/8/15: Colonoscopy    Last PCP note: within 1 month , within [facility] ; Dr. Clark 7/19/19 ([facility])      Other important co-morbidities: Subclinical Hypothyroidism/pulmonary nodules/HTN/HLP/BPH/GEOVANNA/OA     Tests already available:  Available tests,  within 1 month , within [facility] . (A1c/CBC)              Optimization:  Anesthesia Preop Clinic Assessment  Indicated    Medical Opinion Indicated           Plan:    Testing:  BMP and PT/INR   Pre-anesthesia  visit       Visit focus: possible regional anesthesia and/or nerve block      Consultation:IM Perioperative Hospitalist        Navigation: Tests Scheduled.              Consults scheduled.             Results will be tracked by Preop Clinic.

## 2019-08-23 NOTE — ANESTHESIA PREPROCEDURE EVALUATION
Anesthesia Assessment: Preoperative EQUATION     Planned Procedure: Procedure(s) (LRB):  ARTHROPLASTY, KNEE, TOTAL-VINCENZO-SAME DAY (Right)  Requested Anesthesia Type:Regional  Surgeon: Arturo Schultz MD  Service: Orthopedics  Known or anticipated Date of Surgery:9/5/2019     Surgeon notes: reviewed; [place] 6/17/19     Previous anesthesia records: General 9/8/15: Colonoscopy     Last PCP note: within 1 month , within [facility] ; Dr. Clark 7/19/19 ([facility])        Other important co-morbidities: Subclinical Hypothyroidism/pulmonary nodules/HTN/HLP/BPH/GEOVANNA/OA     Tests already available:  Available tests,  within 1 month , within [facility] . (A1c/CBC)                               Optimization:  Anesthesia Preop Clinic Assessment  Indicated    Medical Opinion Indicated                                        Plan:    Testing:  BMP and PT/INR   Pre-anesthesia  visit                                        Visit focus: possible regional anesthesia and/or nerve block                            Consultation:IM Perioperative Hospitalist                               Navigation: Tests Scheduled.                         Consults scheduled.                        Results will be tracked by Preop Clinic.                                 Electronically signed by Jackeline Sanchez RN at 8/23/2019  3:26 PM                                                                                                          08/23/2019  Elmer Borrero is a 63 y.o., male.    Anesthesia Evaluation      I have reviewed the Medications.   Steroids Taken In Past Year:     Review of Systems  Anesthesia Hx:  History of prior surgery of interest to airway management or planning: Previous anesthesia: General 9/2015: Colonoscopy with general anesthesia. Procedure performed at an [facility]. Denies Family Hx of Anesthesia complications.  Personal Hx of Anesthesia complications (Elevated BP in PACU with extended stay  after partial knee surgery)   Social:  Denies Tobacco Use. Alcohol Use: Pt consumes daily: 2-3 beers and 2-3 glasses of wine,    EENT/Dental:   Denies Throat Symptoms Denies Jaw Problems   Cardiovascular:  Functional Capacity good / => 4 METS, cuts grass; weedeating; fishing: denies CP/+ SOB  Denies Coronary Artery Disease.  Denies Deep Venous Thrombosis (DVT)  Hypertension    Pulmonary:   Pulmonary nodule : Left lung- stable per CT 6/2017 Denies Asthma.  Denies Chronic Obstructive Pulmonary Disease (COPD).  Obstructive Sleep Apnea (GEOVANNA), CPAP used.   Renal/:  Denies Kidney Function/Disease  Other Renal / Gu Conditions: Benign Prostatic Hypertrophy (BPH)  Hepatic/GI:  Denies Esophageal / Stomach Disorders  Hernia, Umbilical Hernia Liver Disease (Intermittent elevated LFTs), Abnormal Liver Enzymes, Hepatitis (A??- > 30 yrs ago)    Musculoskeletal:  Joint Disease:  Arthritis, Osteoarthritis  Lumbar Spine Disorders S/P lumbar surgery  Neurological:  Pain , location of bilateral hand; right knee , severity is a 5  Osteoarthritis  Denies Peripheral Neuropathy  Denies Seizure Disorder  Denies CVA - Cerebrovasular Accident  Denies TIA - Transient Ischemic Attack    Endocrine:   Hypothyroidism (Subclinical)  Denies Diabetes  Denies Thyroid Disease  Metabolic Disorders, Hyperlipoproteinemia, mixed hyperlipidemia      Physical Exam  General:  Obesity    Airway/Jaw/Neck:  Airway Findings: Mouth Opening: Normal Jaw/Neck Findings:  Neck ROM: Normal ROM      Dental:  Dental Findings: In tact        Mental Status:  Mental Status Findings:  Cooperative, Alert and Oriented         Anesthesia Plan  Type of Anesthesia, risks & benefits discussed:  Anesthesia Type:  spinal  Patient's Preference:   Intra-op Monitoring Plan: standard ASA monitors  Intra-op Monitoring Plan Comments:   Post Op Pain Control Plan: multimodal analgesia and IV/PO Opioids PRN  Post Op Pain Control Plan Comments: Opioids and analgesic adjuvants as  needed  Regional blocks if applicable/indicated  Induction:   IV  Beta Blocker:  Patient is not currently on a Beta-Blocker (No further documentation required).       Informed Consent: Patient understands risks and agrees with Anesthesia plan.  Questions answered. Anesthesia consent signed with patient.  ASA Score: 3     Day of Surgery Review of History & Physical:    H&P update referred to the surgeon.     Anesthesia Plan Notes: I have personally evaluated the patient and discussed risk/benefits/alternatives of general anesthesia.        Ready For Surgery From Anesthesia Perspective.     The patient was seen by Perioperative Internal Medicine physician Dr. Gregorio on 8/29/19 , please see recommendations.      Discharge Plan: To home with wife (Faith: [phone])    Jackeline Sanchez RN

## 2019-08-26 ENCOUNTER — HOSPITAL ENCOUNTER (OUTPATIENT)
Facility: HOSPITAL | Age: 64
Discharge: HOME | End: 2019-08-26
Attending: ORTHOPAEDIC SURGERY | Admitting: ORTHOPAEDIC SURGERY
Payer: COMMERCIAL

## 2019-08-26 VITALS
HEIGHT: 69 IN | DIASTOLIC BLOOD PRESSURE: 79 MMHG | SYSTOLIC BLOOD PRESSURE: 156 MMHG | HEART RATE: 80 BPM | BODY MASS INDEX: 38.51 KG/M2 | TEMPERATURE: 97 F | WEIGHT: 260 LBS | OXYGEN SATURATION: 98 % | RESPIRATION RATE: 18 BRPM

## 2019-08-26 DIAGNOSIS — M17.11 PRIMARY OSTEOARTHRITIS OF RIGHT KNEE: ICD-10-CM

## 2019-08-26 PROCEDURE — 64640 INJECTION TREATMENT OF NERVE: CPT | Mod: RT,,, | Performed by: NURSE PRACTITIONER

## 2019-08-26 PROCEDURE — 64640 INJECTION TREATMENT OF NERVE: CPT

## 2019-08-26 PROCEDURE — C2618 PROBE/NEEDLE, CRYO: HCPCS | Performed by: NURSE PRACTITIONER

## 2019-08-26 PROCEDURE — 64640 PR DESTRUCT BY NEURO AGENT; OTHER PERIPH NERVE: ICD-10-PCS | Mod: RT,,, | Performed by: NURSE PRACTITIONER

## 2019-08-26 NOTE — DISCHARGE SUMMARY
Discharge Note  Short Stay      SUMMARY     Admit Date: 8/26/2019    Attending Physician: Lisandra Beard      Discharge Physician: Lisandra Beard      Discharge Date: 8/26/2019 10:54 AM    Procedure(s) (LRB):  CRYOTHERAPY, NERVE, PERIPHERAL, PERCUTANEOUS, USING LIQUID NITROUS OXIDE IN CLOSED NEEDLE DEVICE (Right)    Final Diagnosis: Primary osteoarthritis of right knee [M17.11]    Disposition: Home or self care    Patient Instructions:   Current Discharge Medication List      CONTINUE these medications which have NOT CHANGED    Details   allopurinol (ZYLOPRIM) 300 MG tablet TAKE 1 TABLET ONCE DAILY  Qty: 90 tablet, Refills: 1    Associated Diagnoses: Gout, unspecified cause, unspecified chronicity, unspecified site      aspirin (ECOTRIN) 81 MG EC tablet Take 81 mg by mouth once daily.      cyclobenzaprine (FLEXERIL) 10 MG tablet TAKE 1 TABLET BY MOUTH 3 TIMES A DAY AS NEEDED FOR MUSCLE SPASMS  Qty: 45 tablet, Refills: 1      diclofenac (VOLTAREN) 75 MG EC tablet TAKE 1 TABLET BY MOUTH TWICE A DAY AS NEEDED. MAY ALTERNATE WITH OTHER NSAID EVERY OTHER WEEK  Qty: 180 tablet, Refills: 0    Associated Diagnoses: Arthralgia, unspecified joint      diphenhydrAMINE (SOMINEX) 25 mg tablet Take 50 mg by mouth nightly as needed for Insomnia.      finasteride (PROSCAR) 5 mg tablet TAKE 1 TABLET BY MOUTH EVERY DAY  Qty: 90 tablet, Refills: 1      KRILL OIL ORAL Take 2,000 mg by mouth once daily at 6am.       loratadine (CLARITIN) 10 mg tablet Take 1 tablet by mouth daily as needed.      meloxicam (MOBIC) 15 MG tablet TAKE 1 TABLET BY MOUTH EVERY DAY  Qty: 90 tablet, Refills: 0    Associated Diagnoses: Arthralgia, unspecified joint      montelukast (SINGULAIR) 10 mg tablet TAKE 1 TABLET EVERY EVENING  Qty: 90 tablet, Refills: 1      multivitamin (THERAGRAN) per tablet Take 1 tablet by mouth once daily.      tamsulosin (FLOMAX) 0.4 mg Cap TAKE 1 CAPSULE DAILY  Qty: 90 capsule, Refills: 1    Associated Diagnoses: Benign  prostatic hyperplasia      telmisartan-hydrochlorothiazide (MICARDIS HCT) 80-25 mg per tablet Take 1 tablet by mouth once daily.  Qty: 90 tablet, Refills: 3    Associated Diagnoses: Hypertension, unspecified type      acetaminophen-codeine 300-30mg (TYLENOL #3) 300-30 mg Tab Take 1 tablet by mouth every 6 (six) hours.  Refills: 0      mupirocin (BACTROBAN) 2 % ointment 3 (three) times daily. Apply to affected area  Refills: 0                 Discharge Diagnosis: Primary osteoarthritis of right knee [M17.11]  Condition on Discharge: Stable with no complications to procedure   Diet on Discharge: Same as before.  Activity: as per instruction sheet.  Discharge to: Home with a responsible adult.  Follow up: 2-4 weeks       Please call my office or pager at [phone] if experienced any weakness or loss of sensation, fever > 101.5, pain uncontrolled with oral medications, persistent nausea/vomiting/or diarrhea, redness or drainage from the incisions, or any other worrisome concerns. If physician on call was not reached or could not communicate with our office for any reason please go to the nearest emergency department

## 2019-08-26 NOTE — NURSING
Discharge instructions and medlist given.  Patient and spouse verbalized understanding.  Denies need for escort off unit.  Off unit walking with spouse.

## 2019-08-26 NOTE — OP NOTE
Procedure Note Iovera:    DATE OF PROCEDURE:  8/26/2019    PREOPERATIVE DIAGNOSIS: right knee osteoarthritis.     POSTOPERATIVE DIAGNOSIS: right knee osteoarthritis.     PROCEDURE: Iovera treatment of anterior femoral cutaneous nerve, and both branches of infrapatellar saphenous nerve using at least 3 different punctures to treat all 3 nerves. (cpt 64640 x3)    ATTENDING SURGEON: Lisandra Beard NP    Interventional Pain Management    ASSISTANT: none    COMPLICATIONS: None.     IMPLANTS:  None    ESTIMATED BLOOD LOSS:  < 5cc    SPECIMENS REMOVED:  None    ANESTHESIA: Local lidocaine 2%    INDICATIONS FOR PROCEDURE: This is a 63 y.o. male with longstanding knee pain. They have failed non operative management including injections.I discussed a new treatment therapy called Iovera, which is cryotherapy, to provide symptomatic relief along the sensory distribution of the infrapatellar tendon branch of the saphenous nerve  and AFCN. The patient elected to move forward with the procedure. The patient was given patient information and literature to review prior to the procedure as well.  Based on this, the patient agreed to move forward with doing the procedure.      PROCEDURE:    The patient was placed supine on the exam table and the proximal medial aspect of the right tibia and anterior aspect of distal femur was prepped with Chlorhexidine.  A line was drawn extending approximately 5 cm medial to inferior pole of the patella distally to a point approximately 5 cm medial to the tibial tubercle.  A second line was drawn in a medial to lateral direction the width of the patella approximately 7 cm proximal to the patella. We then infiltrated the skin with lidocaine 2% along both lines using a 25g needle. We then introduced the Iovera device along these lines and this device penetrated the skin, creating cryotherapy to both branches of the infrapatellar saphenous nerve and a third treatment to the anterior femoral  cutaneous nerve. 7 punctures of the skin were made to treat the 2 branches of the ISN and another 7 punctures were made to treat the AFCN. There were a total of 3 nerves treated with iovera. The patient tolerated the procedure well with no problem

## 2019-08-26 NOTE — H&P
HPI    PMHx, PSHx, Allergies, Medications reviewed in epic    ROS negative except pain complaints in HPI    OBJECTIVE:    There were no vitals taken for this visit.    PHYSICAL EXAMINATION:    GENERAL: Well appearing, in no acute distress, alert and oriented x3.  PSYCH:  Mood and affect appropriate.  SKIN: Skin color, texture, turgor normal, no rashes or lesions.  CV: RRR with palpation of the radial artery.  PULM: No evidence of respiratory difficulty, symmetric chest rise.  NEURO: Cranial nerves grossly intact.    Plan:    Proceed with injection as planned    Lisandra Beard  08/26/2019

## 2019-08-26 NOTE — DISCHARGE INSTRUCTIONS
Home Care Instructions Pain Management:    1. DIET:   You may resume your normal diet today.   2. BATHING:   You may shower with luke warm water.  3. DRESSING:   You may remove your bandage today.   4. ACTIVITY LEVEL:   You may resume your normal activities today. No strenuous activity for 24 hours after your procedure.   5. MEDICATIONS:   You may resume your normal medications today.   6. SPECIAL INSTRUCTIONS:   You may bath or shower this evening.    Use ice pack to injection site for any pain or discomfort.  Apply ice packs for 20 minute intervals as needed.     PLEASE CALL YOUR DOCTOR IF:  1. Redness or swelling around the injection site.  2. Fever of 101 degrees  3. Drainage (pus) from the injection site.  4. For any continuous bleeding (some dried blood over the incision is normal.)    FOR EMERGENCIES:   If any unusual problems or difficulties occur during clinic hours, call [phone] or [phone].

## 2019-08-26 NOTE — PLAN OF CARE
Problem: Adult Inpatient Plan of Care  Goal: Plan of Care Review  Outcome: Ongoing (interventions implemented as appropriate)  Received report from Augustus. Patient s/p pain injection, AAOx3. VSS, no c/o pain or discomfort at this time, resp even and unlabored. Bandaid x2 to R knee is CDI. No active bleeding. No hematoma noted. Post procedure protocol reviewed with patient and patient's family. Understanding verbalized. Family members at bedside. Nurse call bell within reach. Will continue to monitor per post procedure protocol.

## 2019-08-29 ENCOUNTER — TELEPHONE (OUTPATIENT)
Dept: ALLERGY | Facility: CLINIC | Age: 64
End: 2019-08-29

## 2019-08-29 ENCOUNTER — HOSPITAL ENCOUNTER (OUTPATIENT)
Dept: PREADMISSION TESTING | Facility: HOSPITAL | Age: 64
Discharge: HOME | End: 2019-08-29
Attending: ANESTHESIOLOGY
Payer: COMMERCIAL

## 2019-08-29 ENCOUNTER — TELEPHONE (OUTPATIENT)
Dept: DERMATOLOGY | Facility: CLINIC | Age: 64
End: 2019-08-29

## 2019-08-29 ENCOUNTER — INITIAL CONSULT (OUTPATIENT)
Dept: INTERNAL MEDICINE | Facility: CLINIC | Age: 64
End: 2019-08-29
Payer: COMMERCIAL

## 2019-08-29 VITALS
OXYGEN SATURATION: 97 % | TEMPERATURE: 98 F | HEIGHT: 68 IN | DIASTOLIC BLOOD PRESSURE: 82 MMHG | HEART RATE: 77 BPM | BODY MASS INDEX: 39.33 KG/M2 | SYSTOLIC BLOOD PRESSURE: 144 MMHG | WEIGHT: 259.5 LBS

## 2019-08-29 DIAGNOSIS — Z98.890 PERSONAL HISTORY OF SPINE SURGERY: ICD-10-CM

## 2019-08-29 DIAGNOSIS — E03.8 SUBCLINICAL HYPOTHYROIDISM: ICD-10-CM

## 2019-08-29 DIAGNOSIS — R60.9 EDEMA, UNSPECIFIED TYPE: ICD-10-CM

## 2019-08-29 DIAGNOSIS — R77.8 ELEVATED TOTAL PROTEIN: ICD-10-CM

## 2019-08-29 DIAGNOSIS — E78.2 MIXED HYPERLIPIDEMIA: ICD-10-CM

## 2019-08-29 DIAGNOSIS — R21 RASH: ICD-10-CM

## 2019-08-29 DIAGNOSIS — K76.0 FATTY LIVER: ICD-10-CM

## 2019-08-29 DIAGNOSIS — R22.30 MASS OF SHOULDER REGION: ICD-10-CM

## 2019-08-29 DIAGNOSIS — Z79.1 NSAID LONG-TERM USE: ICD-10-CM

## 2019-08-29 DIAGNOSIS — R39.12 BENIGN PROSTATIC HYPERPLASIA WITH WEAK URINARY STREAM: ICD-10-CM

## 2019-08-29 DIAGNOSIS — F10.10 EXCESSIVE DRINKING ALCOHOL: ICD-10-CM

## 2019-08-29 DIAGNOSIS — K42.9 UMBILICAL HERNIA WITHOUT OBSTRUCTION AND WITHOUT GANGRENE: ICD-10-CM

## 2019-08-29 DIAGNOSIS — N40.1 BENIGN PROSTATIC HYPERPLASIA WITH WEAK URINARY STREAM: ICD-10-CM

## 2019-08-29 DIAGNOSIS — G47.33 OSA ON CPAP: ICD-10-CM

## 2019-08-29 DIAGNOSIS — R79.89 ABNORMAL LFTS: ICD-10-CM

## 2019-08-29 DIAGNOSIS — Z01.818 PREOP EXAMINATION: Primary | ICD-10-CM

## 2019-08-29 DIAGNOSIS — H26.9 CATARACT OF BOTH EYES, UNSPECIFIED CATARACT TYPE: ICD-10-CM

## 2019-08-29 DIAGNOSIS — R91.1 PULMONARY NODULE SEEN ON IMAGING STUDY: ICD-10-CM

## 2019-08-29 DIAGNOSIS — I10 ESSENTIAL HYPERTENSION: ICD-10-CM

## 2019-08-29 DIAGNOSIS — R73.9 HYPERGLYCEMIA: ICD-10-CM

## 2019-08-29 PROCEDURE — 99999 PR PBB SHADOW E&M-EST. PATIENT-LVL III: CPT | Mod: PBBFAC,,, | Performed by: HOSPITALIST

## 2019-08-29 PROCEDURE — 99999 PR PBB SHADOW E&M-EST. PATIENT-LVL III: ICD-10-PCS | Mod: PBBFAC,,, | Performed by: HOSPITALIST

## 2019-08-29 PROCEDURE — 99204 OFFICE O/P NEW MOD 45 MIN: CPT | Mod: S$GLB,,, | Performed by: HOSPITALIST

## 2019-08-29 PROCEDURE — 99204 PR OFFICE/OUTPT VISIT, NEW, LEVL IV, 45-59 MIN: ICD-10-PCS | Mod: S$GLB,,, | Performed by: HOSPITALIST

## 2019-08-29 NOTE — DISCHARGE INSTRUCTIONS
Your surgery has been scheduled for:__________________________________________    You should report to:  ____[facility], located on the [place] side of the first floor of the           [facility] ([phone])  ____[facility], located on the [facility] side of the            Second floor of the [facility] ([phone])  ____3rd Floor [facility] located on the [facility] side of the [facility] [phone]  Please Note   - Tell your doctor if you take Aspirin, products containing Aspirin, herbal medications  or blood thinners, such as Coumadin, Ticlid, or Plavix.  (Consult your provider regarding holding or stopping before surgery).  - Arrange for someone to drive you home following surgery.  You will not be allowed to leave the surgical facility alone or drive yourself home following sedation and anesthesia.  Before Surgery  - Stop taking all herbal medications 14days prior to surgery  - No Motrin/Advil (Ibuprofen) 7 days before surgery  - No Aleve (Naproxen) 7 days before surgery  - Stop Taking Asprin, products containing Asprin _____days before surgery  - Stop taking blood thinners_______days before surgery  - No Goody's/BC  Powder 7 days before surgery  - Refrain from drinking alcoholic beverages for 24hours before and after surgery  - Stop or limit smoking _________days before surgery  - You may take Tylenol for pain  Night before Surgery  - Do not eat or drink after midnight  - Take a shower or bath (shower is recommended).  Bathe with Hibiclens soap or an antibacterial soap from the neck down.  If not supplied by your surgeon, hibiclens soap will need to be purchased over the counter in pharmacy.  Rinse soap off thoroughly.  - Shampoo your hair with your regular shampoo  The Day of Surgery  - Take another bath or shower with hibiclens or any antibacterial soap, to reduce the chance of infection.  - Take heart  and blood pressure medications with a small sip of water, as advised by the perioperative team.  - Do not take fluid pills  - You may brush your teeth and rinse your mouth, but do not swall any additional water.   - Do not apply perfumes, powder, body lotions or deodorant on the day of surgery.  - Nail polish should be removed.  - Do not wear makeup or moisturizer  - Wear comfortable clothes, such as a button front shirt and loose fitting pants.  - Leave all jewelry, including body piercings, and valuables at home.    - Bring any devices you will neeed after surgery such as crutches or canes.  - If you have sleep apnea, please bring your CPAP machine  In the event that your physical condition changes including the onset of a cold or respiratory illness, or if you have to delay or cancel your surgery, please notify your surgeon.   Anesthesia: Regional Anesthesia    Youre scheduled for surgery. During surgery, youll receive medicine called anesthesia to keep you comfortable and pain-free. Your surgeon has decided that youll receive regional anesthesia. This sheet tells you what to expect with this type of anesthesia.  What is regional anesthesia?  Regional anesthesia numbs one region of your body. The anesthesia may be given around nerves or into veins in your arms, neck, or legs (nerve block or Roxane block). Or it may be sent into the spinal fluid (spinal anesthesia) or into the space just outside the spinal fluid (epidural anesthesia). You may also be given sedatives to help you relax.  Nerve block or Livengood block  A small area of the body, such as an arm or leg, can be numbed using a nerve block or Livengood block.  · Nerve block. During a nerve block, your skin is numbed. A needle is then inserted near nerves that serve the area to be numbed. Anesthetic is sent through the needle.  · IV regional or Livengood block. For this type of block, an IV line is put into a vein. The blood flow to the area to be numbed is blocked for  a short time. Anesthetic is sent through the IV.  Spinal anesthesia  Spinal anesthesia numbs your body from about the waist down.  · Anesthetic is injected into the spinal fluid. This is a substance that surrounds the spinal cord in your spinal column. The anesthetic blocks pain traveling from the body to the brain.  · To receive the anesthetic, your skin is numbed at the injection site on your back.  · A needle is then inserted into the spinal space. Anesthetic is sent into the spinal fluid through the needle.  Epidural anesthesia  Epidural anesthesia is most commonly used during childbirth and may also be used after surgical procedures of the chest, belly, and legs.  · Anesthetic is injected into the epidural space. This is just outside the dural sac which contains the spinal fluid.  · To receive the anesthetic, your skin is numbed at the injection site on your back.  · A needle is then inserted into the epidural space. Anesthetic is sent into the epidural space through the needle.  · A small flexible catheter may be attached to the needle and left in place. This allows for continuous injections or infusions of anesthetic.  Anesthesia tools and medicines that might be near you during your procedure  · Local anesthetic. This medicine is given through a needle numbs one region of your body.  · Electrocardiography leads (electrodes). These are used to record your heart rate and rhythm.  · Blood pressure cuff. A cuff is placed on your arm to keep track of your blood pressure.  · Pulse oximeter. This small clip is placed on the end of the finger. It measures your blood oxygen level.  · Sedatives. These medicines may be given through an IV. They help to relax you and keep you comfortable. You may stay awake or sleep lightly.  · Oxygen. You may be given oxygen through a facemask.  Risks and possible complications  Regional anesthesia carries some risks. These include:  · Nausea and  vomiting  · Headache  · Backache  · Decreased blood pressure  · Allergic reaction to the anesthetic  · Ongoing numbness (rare)  · Irregular heartbeat (rare)  · Cardiac arrest (rare)   Date Last Reviewed: 12/1/2016  © 4469-7899 [employer]. [address] [place] [address]. All rights reserved. This information is not intended as a substitute for professional medical care. Always follow your healthcare professional's instructions.

## 2019-08-29 NOTE — HPI
History of present illness- I had the pleasure of meeting this pleasant 63 y.o. gentleman in the pre op clinic prior to his elective Orthopedic surgery. The patient is new to me . Elmer was accompanied by wife Faith .    I have obtained the history by speaking to the patient and by reviewing the electronic health records.    Events leading up to surgery / History of presenting illness -    He has been troubled with moderate-severe  Rt knee   pain for a few years . Pain increases with certain positions  and activity and decreases with resting.    Relevant health conditions of significance for the perioperative period/ History of presenting illness -    Subjectively describes health as good      Limited Physical activity due to both knee pains, Left foot pain      Health conditions of significance for the perioperative period - HTN, Sleep apnea , BPH    Lives with wife in [place]   Help available pot op     Not known to have heart disease ,  Lung disease

## 2019-08-29 NOTE — TELEPHONE ENCOUNTER
----- Message from Micaela Moeller sent at 8/29/2019 11:33 AM CDT -----  Contact: Natalie in PreOP  Natalie is needing a call back to see if she can get the pt in to be seen today for a rash     Contact Natalie @ ext [phone]    Pt has a rash on leg and buttocks that has lasted since July

## 2019-08-29 NOTE — ASSESSMENT & PLAN NOTE
June 2017     Stable less than 5 mm nodules and calcified granulomas unchanged from 06/30/2016 with nothing new    Suggested follow up

## 2019-08-29 NOTE — ASSESSMENT & PLAN NOTE
Intermittent elevated LFT's   Suggested follow up   Suggest care with usage of Medication that are hepatotoxic or  Metabolized in the liver   -      Labs from 8/29 - INR -N  Mildly elevated total protein , liver function test   I suggest follow up   The cause of elevated Transaminases could be NSAID , Alcohol   INR is normal   Check ultrasound scan

## 2019-08-29 NOTE — ASSESSMENT & PLAN NOTE
Telmisartan- HCTZ    Was on Losartan , HCTZ    Home BP readings -none   Recent BP readings in the record-140/80's  Hypertension-  Blood pressure is acceptable .  I suggest holding - Telmisartan- HCTZ -- on the morning of the surgery and can continue that  post operatively under blood pressure, electrolyte and renal function monitoring as long as they are acceptable.I suggest addressing pain control as uncontrolled pain can increased blood pressure     Had BP eleveation in the post operative setting     Eats out a lot - suggested avoiding it   Suggested alcohol excess - that could be contributing  No history  of cirrhosis of liver or suggestion of hepatic decompensation      Suggested BP monitoring

## 2019-08-29 NOTE — ASSESSMENT & PLAN NOTE
Sleep apnea .Uses CPAP .Suggested bringing for hospital use . Informed the risk of worsening sleep apnea in the perioperative period and suggest using CPAP use any time in 24 hrs ( day or night )for planned sleep       I suggest  caution with usage of medication that can cause respiratory suppression in the perioperative period    Avoidance of  supine sleep, weight gain , alcoholic beverages , care with , sedative , CNS depressant use indicated  since all of these can worsen GEOVANNA

## 2019-08-29 NOTE — ASSESSMENT & PLAN NOTE
Edema- I suggested avoidance of added salt,avoidance of NSAID's, unless advised or ordered  and suggested Limb elevation and luis hose use

## 2019-08-29 NOTE — ASSESSMENT & PLAN NOTE
Finasteride  Urinating well- but has a weaker stream    Increased risk of post operative urinary retention    Benadryl avoidance suggested

## 2019-08-29 NOTE — LETTER
August 29, 2019      Arturo Schultz MD  [address]  [place] [address]           [facility]son samuel - Pre Op Consult  [address] [facility] [address]  Phone: [phone]          Patient: Elmer Borrero   MR Number: [id number]   YOB: 1955   Date of Visit: 8/29/2019       Dear Dr. Arturo Schultz:    Thank you for referring Elmer Borrero to me for evaluation. Attached you will find relevant portions of my assessment and plan of care.    If you have questions, please do not hesitate to call me. I look forward to following Elmer Borrero along with you.    Sincerely,    Monet Gregorio MD    Enclosure  CC:  No Recipients    If you would like to receive this communication electronically, please contact [email or web address] or [phone] to request more information on [employer] access.    For providers and/or their staff who would like to refer a patient to [facility], please contact us through our one-stop-shop provider referral line, [facility] [place] operated by [facility], at [phone].    If you feel you have received this communication in error or would no longer like to receive these types of communications, please e-mail [email or web address]

## 2019-08-29 NOTE — ASSESSMENT & PLAN NOTE
Buttocks , Left leg   Itchy   Since  July 2019   Tried over the counter anti fungal and steroid   Itching better   Buttock rash better   Suggested evaluation in Primary care   Does not look infective rash like cellulitis   No new goods, Detergents

## 2019-08-29 NOTE — TELEPHONE ENCOUNTER
----- Message from Lizzy Munira sent at 8/29/2019 10:24 AM CDT -----  Contact: Natalie cox/ Fidelina cox/ Dr. Casanova     x [phone]  Pt. Has   itchy rash/ welps  on backside, buttons and left leg.   Pt. Is here from [place].    Asking if any of the drs. Can see him today asap.   Pls call.

## 2019-08-29 NOTE — ASSESSMENT & PLAN NOTE
Lumbar   ? L3/L4      I suggest that the perioperative team be aware of this so that appropriate care can be taken

## 2019-08-29 NOTE — ASSESSMENT & PLAN NOTE
Suggested alcohol excess - that could be contributing  No history  of cirrhosis of liver or suggestion of hepatic decompensation   Suggested cutting back and avoidance

## 2019-08-29 NOTE — PROGRESS NOTES
[facility] - Pre Op Consult  Progress Note    Patient Name: Elmer Borrero  MRN: [id number]  Date of Evaluation- 09/03/2019  PCP- Dae Romo MD    Future cases for Elmer Borrero [[id number]]     Case ID Status Date Time Kamaljit Procedure Provider Location    [id number] [facility] 9/5/2019  7:30  ARTHROPLASTY, KNEE, TOTAL-VINCENZO-SAME DAY Arturo Schultz MD [[id number]] [facility] OR 2ND FLR      Rt     HPI:  History of present illness- I had the pleasure of meeting this pleasant 63 y.o. gentleman in the pre op clinic prior to his elective Orthopedic surgery. The patient is new to me . Elmer was accompanied by wife Faith .    I have obtained the history by speaking to the patient and by reviewing the electronic health records.    Events leading up to surgery / History of presenting illness -    He has been troubled with moderate-severe  Rt knee   pain for a few years . Pain increases with certain positions  and activity and decreases with resting.    Relevant health conditions of significance for the perioperative period/ History of presenting illness -    Subjectively describes health as good      Limited Physical activity due to both knee pains, Left foot pain      Health conditions of significance for the perioperative period - HTN, Sleep apnea , BPH    Lives with wife in [place]   Help available pot op     Not known to have heart disease ,  Lung disease           Subjective/ Objective:          Chief complaint-Preoperative evaluation, Perioperative Medical management, complication reduction plan     Active cardiac conditions- none    Revised cardiac risk index predictors- none    Functional capacity -Examples of physical activity, limited, active , trims the grass , edges, cuts grass with a rider mower,works around the house,   can take a flight of stairs holding on to the railing----- He can undertake all the above activities without  chest pain,chest tightness, Shortness of breath ,dizziness,lightheadedness making  "his exercise tolerance more,   than 4 Mets.   Winded on exertion, not new ,stable over time - wife attributes to deconditioning    Review of Systems   Constitutional: Negative for chills and fever.          Weight gain from reduced activity   HENT:        Sleep apnea   Eyes:        No unusual vision changes   Respiratory:        Post nasal drip   Allergies   No  phlegm    No Hemoptysis   Cardiovascular:        As noted   Gastrointestinal:        Bowels- Regular    No overt GI/ blood losses   Endocrine:        Prednisone use > 20 mg daily for 3 weeks- none   Genitourinary: Negative for dysuria.            Musculoskeletal:        As above   Left hip pain   Shoulder pains  Hand pains   Skin: Positive for rash.   Neurological: Negative for syncope.        No unilateral weakness   Hematological:          Aspirin use for preventive reasons    Psychiatric/Behavioral:        No Depression,Anxiety       No vascular stenting             No anesthesia, bleeding, cardiac problems , PONVwith previous surgeries/procedures.  Medications and Allergies reviewed in epic.   FH- No anesthesia,bleeding / venous thrombosis ,  in family     Physical Exam  Blood pressure (!) 144/82, pulse 77, temperature 98 °F (36.7 °C), height 5' 7.5" (1.715 m), weight 117.7 kg (259 lb 8 oz), SpO2 97 %.      Physical Exam  Constitutional- Vitals - Body mass index is 40.04 kg/m².,   Vitals:    08/29/19 0903   BP: (!) 144/82   Pulse: 77   Temp: 98 °F (36.7 °C)     General appearance-Conscious,Coherent  Eyes- No conjunctival icterus,pupils  round  and reactive to light   ENT-Oral cavity- moist  , Hearing grossly normal   Neck- No thyromegaly ,Trachea -central, No jugular venous distension,   No Carotid Bruit   Cardiovascular -Heart Sounds- Normal  and  no murmur   , No gallop rhythm   Respiratory - Normal Respiratory Effort, Normal breath sounds, crepitations bases,  no wheeze  and  no forced expiratory wheeze    Peripheral pitting pedal edema-- mild, no " calf pain   Gastrointestinal -Soft abdomen, No palpable masses, Non Tender,Liver,Spleen not palpable. No-- free fluid and shifting dullness  Musculoskeletal- No finger Clubbing. Strength grossly normal   Lymphatic-No Palpable cervical, axillary,Inguinal lymphadenopathy   Psychiatric - normal effect,Orientation  Rt Dorsalis pedis pulses-palpable    Lt Dorsalis pedis pulses- palpable   Rt Posterior tibial pulses -palpable   Left posterior tibial pulses -palpable   Miscellaneous - macular rash buttocks medila, maculopapular rash Left lower leg posterior,  no asterixis,  no dupuytren's contracture,  no suggestion of bacterial feet infection,  no renal bruit and  onychomycosis  Investigations  Lab and Imaging have been reviewed in [facility].    Review of Medicine tests    EKG- I had independently reviewed the EKG from--7/9/2019   It was reported to be showing     Normal sinus rhythm  Normal ECG  When compared with ECG of 28-JUN-2017 11:36,  No significant change was found    Review of clinical lab tests:  Lab Results   Component Value Date    CREATININE 1.0 07/09/2019    HGB 15.5 07/09/2019     07/09/2019           Review of old records- Was done and information gathered regards to events leading to surgery and health conditions of significance in the perioperative period.        Preoperative cardiac risk assessment-  The patient does not have any active cardiac conditions . Revised cardiac risk index predictors- 0---.Functional capacity is more than 4 Mets. He will be undergoing a Orthopedic procedure that carries a intermediate risk     Risk of a major Cardiac event ( Defined as death, myocardial infarction, or cardiac arrest at 30 days after noncardiac surgery), based on RCRI score     -3.9%       No further cardiac work up is indicated prior to proceeding with the surgery     Orders Placed This Encounter    US Abdomen Complete    Hepatic function panel    Ambulatory consult to Dermatology    Ambulatory Referral  to Hepatology       American Society of Anesthesiologists Physical status classification ( ASA ) class:3     Postoperative pulmonary complication risk assessment:      ARISCAT ( Canet) risk index- risk class -  Low, if duration of surgery is under 3 hours, intermediate, if duration of surgery is over 3 hours      Edilberto Respiratory failure index- percentage risk of respiratory failure: 0.5 %     Assessment/Plan:     Hypertension  Telmisartan- HCTZ    Was on Losartan , HCTZ    Home BP readings -none   Recent BP readings in the record-140/80's  Hypertension-  Blood pressure is acceptable .  I suggest holding - Telmisartan- HCTZ -- on the morning of the surgery and can continue that  post operatively under blood pressure, electrolyte and renal function monitoring as long as they are acceptable.I suggest addressing pain control as uncontrolled pain can increased blood pressure     Had BP eleveation in the post operative setting     Eats out a lot - suggested avoiding it   Suggested alcohol excess - that could be contributing  No history  of cirrhosis of liver or suggestion of hepatic decompensation      Suggested BP monitoring     GEOVANNA on CPAP  Sleep apnea .Uses CPAP .Suggested bringing for hospital use . Informed the risk of worsening sleep apnea in the perioperative period and suggest using CPAP use any time in 24 hrs ( day or night )for planned sleep       I suggest  caution with usage of medication that can cause respiratory suppression in the perioperative period    Avoidance of  supine sleep, weight gain , alcoholic beverages , care with , sedative , CNS depressant use indicated  since all of these can worsen GEOVANNA             NSAID long-term use  Renal  Ulcer effects discussed     BP raising effects discussed     Rash  Buttocks , Left leg   Itchy   Since  July 2019   Tried over the counter anti fungal and steroid   Itching better   Buttock rash better   Suggested evaluation in Primary care   Does not look  infective rash like cellulitis   No new goods, Detergents     Pulmonary nodule seen on imaging study  June 2017     Stable less than 5 mm nodules and calcified granulomas unchanged from 06/30/2016 with nothing new    Suggested follow up     Cataracts, bilateral  Not bothersome     BPH (benign prostatic hyperplasia)  Finasteride  Urinating well- but has a weaker stream    Increased risk of post operative urinary retention    Benadryl avoidance suggested    Hyperglycemia  A1c N- July 2019     Subclinical hypothyroidism  July 2019     TSH -Mildly elevated  Free T4 -N   Suggested follow up     Umbilical hernia  No pain     Mass of shoulder region  No new problem   Suggested follow up     Personal history of spine surgery  Lumbar   ? L3/L4      I suggest that the perioperative team be aware of this so that appropriate care can be taken     Excessive drinking alcohol    Suggested alcohol excess - that could be contributing  No history  of cirrhosis of liver or suggestion of hepatic decompensation   Suggested cutting back and avoidance      Abnormal LFTs  Intermittent elevated LFT's   Suggested follow up   Suggest care with usage of Medication that are hepatotoxic or  Metabolized in the liver   -      Labs from 8/29 - INR -N  Mildly elevated total protein , liver function test   I suggest follow up   The cause of elevated Transaminases could be NSAID , Alcohol   INR is normal   Check ultrasound scan     Mixed hyperlipidemia  Statin intolerance  Lipitor - joint pains          Agatston CAC score, >200 - 399  No suggestion of symptomatic CAD       Edema  Edema- I suggested avoidance of added salt,avoidance of NSAID's, unless advised or ordered  and suggested Limb elevation and luis hose use    Elevated total protein  Lab  From 8/29 /2019    INR -N  Mildly elevated total protein    I suggest follow up       Fatty liver  Ultra sound abdomen   Markedly hyperechoic liver, suggesting severe hepatic steatosis  Would benefit  from-Alcohol reduction, weight loss for  fatty liver    No suggestion of portal  Hypertension -spleen is unremarkable.    No suggestion of liver decompensation -No ascites  PLT count normal   Will let him proceed with surgery and refer to hepatology for follow up regarding fatty liver         Preventive perioperative care    Thromboembolic prophylaxis:  His risk factors for thrombosis include morbid obesity, surgical procedure and age.I suggest  thromboembolic prophylaxis ( mechanical/pharmacological, weighing the risk benefits of pharmacological agent use considering acacia procedural bleeding )  during the perioperative period.I suggested being active in the post operative period. The patient is a candidate for extended DVT prophylaxis     Postoperative pulmonary complication prophylaxis-Risk factors for post operative pulmonary complications include sleep apnea  ASA class >2- I suggest incentive spirometry use, early ambulation and end tidal carbon dioxide monitoring  , oral care , head end of bed elevation      Renal complication prophylaxis-Risk factors for renal complications include hypertension . I suggest keeping him well hydrated  in the perioperative period.  Stays hydrated        Surgical site Infection Prophylaxis-I  suggest appropriate antibiotic for Prophylaxis against Surgical site infections     Delirium prophylaxis-Risk factors -Alcohol   - I suggest avoidance / minimizing the use of  Benzodiazepines ( unless the patient has been taking it on a regular basis ),Anticholinergic medication,Antihistamines ( like  Benadryl).I suggest minimizing the use of opioid medication and use of IV tylenol,if it is appropriate. I suggest using the lowest possible dose of opioids for the shortest duration possible in the perioperative period. I suggest to Keep shades/blinds open during the day, lights off and shades closed at night to encourage normal sleep/wake cycle.I encourage the presence of the family member with  the patient at all times, if at all possible as mental status changes can be picked up early by the family members and they help with reorientation. I encouraged the presence of family to help with orientation in the perioperative period. Benadryl avoidance suggested   Suggested watching for with drawl      In view of regional Anesthesia, enlarged prostate  the patient  is at risk of postoperative urinary retention.  I suggest avoidance / minimizing the of  Benzodiazepines,Anticholinergic medication,antihistamines ( Benadryl) , if possible in the perioperative period. I suggest using the minimum possible use of opioids for the minimum period of time in the perioperative period. Benadryl avoidance suggested      This visit was focused on Preoperative evaluation, Perioperative Medical management, complication reduction plans. I suggest that the patient follows up with primary care or relevant sub specialists for ongoing health care.    I appreciate the opportunity to be involved in this patients care. Please feel free to contact me if there were any questions about this consultation.    Patient is optimized     Patient was instructed to call and update me about any changes to health,  medication, office visits ,testing out side of the [facility] operative care center , hospitalizations between now and surgery     Monet Gregorio MD  [facility]   Pager [phone]    Life style changes suggested   Swelling Rt supra clavicular area- no changes   ---  8/30  Tried to  Get him to see Dermatology on 8/29- was unable to   Getting evaluated in primary care on 9/3    Labs from 8/29 - INR -N  Mildly elevated total protein , liver function test   I suggest follow up   The cause of elevated Transaminases could be NSAID , Alcohol   INR is normal   Check ultrasound scan   '-  8/30-     US 9/3   Called and spoke to him, wife    PCP evaluation on 9/3  Not taking Diclofenac,Meloxicam  Alcohol reduction, weight loss for  possible fatty liver    --  9/3- 21 52     Ultra sound abdomen   Markedly hyperechoic liver, suggesting severe hepatic steatosis  Would benefit from-Alcohol reduction, weight loss for  fatty liver    No suggestion of portal  Hypertension -spleen is unremarkable.    No suggestion of liver decompensation -No ascites  PLT count normal   Will let him proceed with surgery and refer to hepatology for follow up regarding fatty liver   ---  9/4- 12 29       Requested the office to schedule Hepatology visit over the next few weeks    Called to follow up , spoke to him  to address any concerns with the up coming surgery or any questions on Medication instructions -  Doing well ,No changes to  Health -  Discussed severe fatty liver   Discussed options here ( post poning surgery versus proceeding with surgery )  -He preferred moving forward with surgery and getting liver evaluation at a different time   Care with NSAID, Tylenol  Suggested  Taking tylenol under 6 pills a day ( could not tell the strength )  On NSAID for 40 years - for arthritis      Rash was evaluated in primary care   Was started on prescription strength cream that he started last night - could not tell the name - suggested bringing for Medication reconciliation when comes for surgery- not using AMm of surgery  Suggested Follow up for protein  /80 at PCP yesterday   Has been off alcohol ,  eating out  Suggested follow up regarding fatty liver     No longer taking Diclofenac, Meloxicam   -  9/4 - 12 44     Messaged surgeon,   PCP regarding his preference to move forward with surgery

## 2019-08-29 NOTE — OUTPATIENT SUBJECTIVE & OBJECTIVE
"Outpatient Subjective & Objective     Chief complaint-Preoperative evaluation, Perioperative Medical management, complication reduction plan     Active cardiac conditions- none    Revised cardiac risk index predictors- none    Functional capacity -Examples of physical activity, limited, active , trims the grass , edges, cuts grass with a rider mower,works around the house,   can take a flight of stairs holding on to the railing----- He can undertake all the above activities without  chest pain,chest tightness, Shortness of breath ,dizziness,lightheadedness making his exercise tolerance more,   than 4 Mets.   Winded on exertion, not new ,stable over time - wife attributes to deconditioning    Review of Systems   Constitutional: Negative for chills and fever.          Weight gain from reduced activity   HENT:        Sleep apnea   Eyes:        No unusual vision changes   Respiratory:        Post nasal drip   Allergies   No  phlegm    No Hemoptysis   Cardiovascular:        As noted   Gastrointestinal:        Bowels- Regular    No overt GI/ blood losses   Endocrine:        Prednisone use > 20 mg daily for 3 weeks- none   Genitourinary: Negative for dysuria.            Musculoskeletal:        As above   Left hip pain   Shoulder pains  Hand pains   Skin: Positive for rash.   Neurological: Negative for syncope.        No unilateral weakness   Hematological:          Aspirin use for preventive reasons    Psychiatric/Behavioral:        No Depression,Anxiety       No vascular stenting             No anesthesia, bleeding, cardiac problems , PONVwith previous surgeries/procedures.  Medications and Allergies reviewed in epic.   FH- No anesthesia,bleeding / venous thrombosis ,  in family     Physical Exam  Blood pressure (!) 144/82, pulse 77, temperature 98 °F (36.7 °C), height 5' 7.5" (1.715 m), weight 117.7 kg (259 lb 8 oz), SpO2 97 %.      Physical Exam  Constitutional- Vitals - Body mass index is 40.04 kg/m².,   Vitals:    " 08/29/19 0903   BP: (!) 144/82   Pulse: 77   Temp: 98 °F (36.7 °C)     General appearance-Conscious,Coherent  Eyes- No conjunctival icterus,pupils  round  and reactive to light   ENT-Oral cavity- moist  , Hearing grossly normal   Neck- No thyromegaly ,Trachea -central, No jugular venous distension,   No Carotid Bruit   Cardiovascular -Heart Sounds- Normal  and  no murmur   , No gallop rhythm   Respiratory - Normal Respiratory Effort, Normal breath sounds, crepitations bases,  no wheeze  and  no forced expiratory wheeze    Peripheral pitting pedal edema-- mild, no calf pain   Gastrointestinal -Soft abdomen, No palpable masses, Non Tender,Liver,Spleen not palpable. No-- free fluid and shifting dullness  Musculoskeletal- No finger Clubbing. Strength grossly normal   Lymphatic-No Palpable cervical, axillary,Inguinal lymphadenopathy   Psychiatric - normal effect,Orientation  Rt Dorsalis pedis pulses-palpable    Lt Dorsalis pedis pulses- palpable   Rt Posterior tibial pulses -palpable   Left posterior tibial pulses -palpable   Miscellaneous - macular rash buttocks medila, maculopapular rash Left lower leg posterior,  no asterixis,  no dupuytren's contracture,  no suggestion of bacterial feet infection,  no renal bruit and  onychomycosis  Investigations  Lab and Imaging have been reviewed in epic.    Review of Medicine tests    EKG- I had independently reviewed the EKG from--7/9/2019   It was reported to be showing     Normal sinus rhythm  Normal ECG  When compared with ECG of 28-JUN-2017 11:36,  No significant change was found    Review of clinical lab tests:  Lab Results   Component Value Date    CREATININE 1.0 07/09/2019    HGB 15.5 07/09/2019     07/09/2019           Review of old records- Was done and information gathered regards to events leading to surgery and health conditions of significance in the perioperative period.    Outpatient Subjective & Objective

## 2019-08-30 PROBLEM — R77.8 ELEVATED TOTAL PROTEIN: Status: ACTIVE | Noted: 2019-08-30

## 2019-09-03 PROBLEM — K76.0 FATTY LIVER: Status: ACTIVE | Noted: 2019-09-03

## 2019-09-04 ENCOUNTER — TELEPHONE (OUTPATIENT)
Dept: ORTHOPEDICS | Facility: CLINIC | Age: 64
End: 2019-09-04

## 2019-09-04 ENCOUNTER — DOCUMENTATION ONLY (OUTPATIENT)
Dept: TRANSPLANT | Facility: CLINIC | Age: 64
End: 2019-09-04

## 2019-09-04 NOTE — ASSESSMENT & PLAN NOTE
Ultra sound abdomen   Markedly hyperechoic liver, suggesting severe hepatic steatosis  Would benefit from-Alcohol reduction, weight loss for  fatty liver    No suggestion of portal  Hypertension -spleen is unremarkable.    No suggestion of liver decompensation -No ascites  PLT count normal   Will let him proceed with surgery and refer to hepatology for follow up regarding fatty liver

## 2019-09-04 NOTE — NURSING
Pt records reviewed.   Pt will be referred to Hepatology.  Excessive drinking alcohol  Abnormal LFTs  Fatty liver  Initial referral received  from the [facility]que.   Referring Provider/diagnosis  Monet Gregorio MD      Referral letter sent to patient.

## 2019-09-04 NOTE — TELEPHONE ENCOUNTER
Spoke with pt, notified him of his 0700 arrival time for surgery on the 2nd floor. Pt verbalized understanding and had no further questions.    ----- Message from Nasrin Rogers sent at 9/4/2019 10:17 AM CDT -----  Contact: Patient  Need Advice:      Reason For Call: Patient have a few questions       Communication Preference: [phone]      Additional:

## 2019-09-04 NOTE — LETTER
September 4, 2019    Elmer Borrero  [address] [address]      Dear Elmer Borrero:    Your doctor has referred you to the [facility]. We are sending this letter to remind you to make an appointment with us to complete the referral process.     Please call us at [phone] to schedule an appointment. We look forward to seeing you soon.     If you received a call and have been scheduled, please disregard this letter.       Sincerely,        [facility]   [address] [place] [address] [id number] [phone]

## 2019-09-05 ENCOUNTER — ANESTHESIA (OUTPATIENT)
Dept: SURGERY | Facility: HOSPITAL | Age: 64
End: 2019-09-05
Payer: MEDICARE

## 2019-09-05 ENCOUNTER — HOSPITAL ENCOUNTER (OUTPATIENT)
Facility: HOSPITAL | Age: 64
Discharge: HOME | End: 2019-09-05
Attending: ORTHOPAEDIC SURGERY | Admitting: ORTHOPAEDIC SURGERY
Payer: MEDICARE

## 2019-09-05 VITALS
DIASTOLIC BLOOD PRESSURE: 88 MMHG | OXYGEN SATURATION: 100 % | HEART RATE: 70 BPM | TEMPERATURE: 98 F | HEIGHT: 67 IN | WEIGHT: 258 LBS | SYSTOLIC BLOOD PRESSURE: 152 MMHG | RESPIRATION RATE: 16 BRPM | BODY MASS INDEX: 40.49 KG/M2

## 2019-09-05 DIAGNOSIS — M17.11 PRIMARY OSTEOARTHRITIS OF RIGHT KNEE: ICD-10-CM

## 2019-09-05 DIAGNOSIS — Z96.659 S/P KNEE REPLACEMENT: ICD-10-CM

## 2019-09-05 PROCEDURE — 27800517 HC TRAY,EPIDURAL-CONTINUOUS: Performed by: STUDENT IN AN ORGANIZED HEALTH CARE EDUCATION/TRAINING PROGRAM

## 2019-09-05 PROCEDURE — 63600175 PHARM REV CODE 636 W HCPCS: Performed by: ANESTHESIOLOGY

## 2019-09-05 PROCEDURE — D9220A PRA ANESTHESIA: Mod: CRNA,,, | Performed by: NURSE ANESTHETIST, CERTIFIED REGISTERED

## 2019-09-05 PROCEDURE — 94761 N-INVAS EAR/PLS OXIMETRY MLT: CPT

## 2019-09-05 PROCEDURE — 27201423 OPTIME MED/SURG SUP & DEVICES STERILE SUPPLY: Performed by: ORTHOPAEDIC SURGERY

## 2019-09-05 PROCEDURE — 27447 TOTAL KNEE ARTHROPLASTY: CPT | Mod: AS,82,RT, | Performed by: PHYSICIAN ASSISTANT

## 2019-09-05 PROCEDURE — 63600175 PHARM REV CODE 636 W HCPCS: Performed by: NURSE PRACTITIONER

## 2019-09-05 PROCEDURE — 27447 TOTAL KNEE ARTHROPLASTY: CPT | Mod: RT,,, | Performed by: ORTHOPAEDIC SURGERY

## 2019-09-05 PROCEDURE — 71000039 HC RECOVERY, EACH ADD'L HOUR: Performed by: ORTHOPAEDIC SURGERY

## 2019-09-05 PROCEDURE — 27447 PR TOTAL KNEE ARTHROPLASTY: ICD-10-PCS | Mod: RT,,, | Performed by: ORTHOPAEDIC SURGERY

## 2019-09-05 PROCEDURE — C2626 INFUSION PUMP, NON-PROG,TEMP: HCPCS | Performed by: STUDENT IN AN ORGANIZED HEALTH CARE EDUCATION/TRAINING PROGRAM

## 2019-09-05 PROCEDURE — 64450 NJX AA&/STRD OTHER PN/BRANCH: CPT | Mod: 59,RT,, | Performed by: ANESTHESIOLOGY

## 2019-09-05 PROCEDURE — 64448 NJX AA&/STRD FEM NRV NFS IMG: CPT | Mod: 59,RT,, | Performed by: ANESTHESIOLOGY

## 2019-09-05 PROCEDURE — 64450 NJX AA&/STRD OTHER PN/BRANCH: CPT | Performed by: STUDENT IN AN ORGANIZED HEALTH CARE EDUCATION/TRAINING PROGRAM

## 2019-09-05 PROCEDURE — 37000008 HC ANESTHESIA 1ST 15 MINUTES: Performed by: ORTHOPAEDIC SURGERY

## 2019-09-05 PROCEDURE — 36000711: Performed by: ORTHOPAEDIC SURGERY

## 2019-09-05 PROCEDURE — 64448 ADDUCTOR CANAL CATHETER: ICD-10-PCS | Mod: 59,RT,, | Performed by: ANESTHESIOLOGY

## 2019-09-05 PROCEDURE — C1776 JOINT DEVICE (IMPLANTABLE): HCPCS | Performed by: ORTHOPAEDIC SURGERY

## 2019-09-05 PROCEDURE — 76942 ECHO GUIDE FOR BIOPSY: CPT | Performed by: STUDENT IN AN ORGANIZED HEALTH CARE EDUCATION/TRAINING PROGRAM

## 2019-09-05 PROCEDURE — 88305 TISSUE EXAM BY PATHOLOGIST: CPT | Performed by: PATHOLOGY

## 2019-09-05 PROCEDURE — 27447 PR TOTAL KNEE ARTHROPLASTY: ICD-10-PCS | Mod: AS,82,RT, | Performed by: PHYSICIAN ASSISTANT

## 2019-09-05 PROCEDURE — 64448 NJX AA&/STRD FEM NRV NFS IMG: CPT | Performed by: STUDENT IN AN ORGANIZED HEALTH CARE EDUCATION/TRAINING PROGRAM

## 2019-09-05 PROCEDURE — 63600175 PHARM REV CODE 636 W HCPCS: Performed by: STUDENT IN AN ORGANIZED HEALTH CARE EDUCATION/TRAINING PROGRAM

## 2019-09-05 PROCEDURE — 88305 TISSUE SPECIMEN TO PATHOLOGY - SURGERY: ICD-10-PCS | Mod: 26,,, | Performed by: PATHOLOGY

## 2019-09-05 PROCEDURE — 76942 ECHO GUIDE FOR BIOPSY: CPT | Mod: 26,,, | Performed by: ANESTHESIOLOGY

## 2019-09-05 PROCEDURE — 25000003 PHARM REV CODE 250: Performed by: NURSE ANESTHETIST, CERTIFIED REGISTERED

## 2019-09-05 PROCEDURE — 63600175 PHARM REV CODE 636 W HCPCS: Performed by: NURSE ANESTHETIST, CERTIFIED REGISTERED

## 2019-09-05 PROCEDURE — D9220A PRA ANESTHESIA: ICD-10-PCS | Mod: ANES,,, | Performed by: ANESTHESIOLOGY

## 2019-09-05 PROCEDURE — D9220A PRA ANESTHESIA: Mod: ANES,,, | Performed by: ANESTHESIOLOGY

## 2019-09-05 PROCEDURE — 76942 ADDUCTOR CANAL CATHETER: ICD-10-PCS | Mod: 26,,, | Performed by: ANESTHESIOLOGY

## 2019-09-05 PROCEDURE — 97116 GAIT TRAINING THERAPY: CPT

## 2019-09-05 PROCEDURE — A4216 STERILE WATER/SALINE, 10 ML: HCPCS | Performed by: NURSE ANESTHETIST, CERTIFIED REGISTERED

## 2019-09-05 PROCEDURE — 71000033 HC RECOVERY, INTIAL HOUR: Performed by: ORTHOPAEDIC SURGERY

## 2019-09-05 PROCEDURE — C1713 ANCHOR/SCREW BN/BN,TIS/BN: HCPCS | Performed by: ORTHOPAEDIC SURGERY

## 2019-09-05 PROCEDURE — 36000710: Performed by: ORTHOPAEDIC SURGERY

## 2019-09-05 PROCEDURE — 88305 TISSUE EXAM BY PATHOLOGIST: CPT | Mod: 26,,, | Performed by: PATHOLOGY

## 2019-09-05 PROCEDURE — 94799 UNLISTED PULMONARY SVC/PX: CPT

## 2019-09-05 PROCEDURE — D9220A PRA ANESTHESIA: ICD-10-PCS | Mod: CRNA,,, | Performed by: NURSE ANESTHETIST, CERTIFIED REGISTERED

## 2019-09-05 PROCEDURE — 88311 DECALCIFY TISSUE: CPT | Mod: 26,,, | Performed by: PATHOLOGY

## 2019-09-05 PROCEDURE — 71000016 HC POSTOP RECOV ADDL HR: Performed by: ORTHOPAEDIC SURGERY

## 2019-09-05 PROCEDURE — 64450 IPACK SS: ICD-10-PCS | Mod: 59,RT,, | Performed by: ANESTHESIOLOGY

## 2019-09-05 PROCEDURE — 88311 TISSUE SPECIMEN TO PATHOLOGY - SURGERY: ICD-10-PCS | Mod: 26,,, | Performed by: PATHOLOGY

## 2019-09-05 PROCEDURE — 25000003 PHARM REV CODE 250: Performed by: NURSE PRACTITIONER

## 2019-09-05 PROCEDURE — 97161 PT EVAL LOW COMPLEX 20 MIN: CPT

## 2019-09-05 PROCEDURE — 37000009 HC ANESTHESIA EA ADD 15 MINS: Performed by: ORTHOPAEDIC SURGERY

## 2019-09-05 PROCEDURE — 71000015 HC POSTOP RECOV 1ST HR: Performed by: ORTHOPAEDIC SURGERY

## 2019-09-05 DEVICE — CEMENT BONE SMPLX HV GENTMYCN: Type: IMPLANTABLE DEVICE | Site: KNEE | Status: FUNCTIONAL

## 2019-09-05 DEVICE — BASEPLATE TIB TOTAL STAB SZ 5: Type: IMPLANTABLE DEVICE | Site: KNEE | Status: FUNCTIONAL

## 2019-09-05 DEVICE — PATELLA TRIATHLON 31X9 SYMTRC: Type: IMPLANTABLE DEVICE | Site: KNEE | Status: FUNCTIONAL

## 2019-09-05 DEVICE — IMPLANTABLE DEVICE: Type: IMPLANTABLE DEVICE | Site: KNEE | Status: FUNCTIONAL

## 2019-09-05 DEVICE — COMP FEM POST STAB CEM SZ 5 RT: Type: IMPLANTABLE DEVICE | Site: KNEE | Status: FUNCTIONAL

## 2019-09-05 RX ORDER — KETAMINE HCL IN 0.9 % NACL 50 MG/5 ML
SYRINGE (ML) INTRAVENOUS
Status: DISCONTINUED | OUTPATIENT
Start: 2019-09-05 | End: 2019-09-05

## 2019-09-05 RX ORDER — ONDANSETRON 2 MG/ML
4 INJECTION INTRAMUSCULAR; INTRAVENOUS EVERY 8 HOURS PRN
Status: DISCONTINUED | OUTPATIENT
Start: 2019-09-05 | End: 2019-09-05 | Stop reason: HOSPADM

## 2019-09-05 RX ORDER — ONDANSETRON 2 MG/ML
INJECTION INTRAMUSCULAR; INTRAVENOUS
Status: DISCONTINUED | OUTPATIENT
Start: 2019-09-05 | End: 2019-09-05

## 2019-09-05 RX ORDER — PROPOFOL 10 MG/ML
VIAL (ML) INTRAVENOUS
Status: DISCONTINUED | OUTPATIENT
Start: 2019-09-05 | End: 2019-09-05

## 2019-09-05 RX ORDER — OXYCODONE HYDROCHLORIDE 5 MG/1
10 TABLET ORAL
Status: DISCONTINUED | OUTPATIENT
Start: 2019-09-05 | End: 2019-09-05 | Stop reason: HOSPADM

## 2019-09-05 RX ORDER — DOCUSATE SODIUM 100 MG/1
100 CAPSULE, LIQUID FILLED ORAL 2 TIMES DAILY PRN
Qty: 60 CAPSULE | Refills: 0 | Status: SHIPPED | OUTPATIENT
Start: 2019-09-05 | End: 2019-11-07

## 2019-09-05 RX ORDER — PREGABALIN 75 MG/1
75 CAPSULE ORAL
Status: COMPLETED | OUTPATIENT
Start: 2019-09-05 | End: 2019-09-05

## 2019-09-05 RX ORDER — MIDAZOLAM HYDROCHLORIDE 1 MG/ML
1 INJECTION INTRAMUSCULAR; INTRAVENOUS EVERY 5 MIN PRN
Status: DISCONTINUED | OUTPATIENT
Start: 2019-09-05 | End: 2019-09-05 | Stop reason: HOSPADM

## 2019-09-05 RX ORDER — CEFAZOLIN SODIUM 1 G/3ML
2 INJECTION, POWDER, FOR SOLUTION INTRAMUSCULAR; INTRAVENOUS
Status: COMPLETED | OUTPATIENT
Start: 2019-09-05 | End: 2019-09-05

## 2019-09-05 RX ORDER — SODIUM CHLORIDE 9 MG/ML
INJECTION, SOLUTION INTRAVENOUS
Status: COMPLETED | OUTPATIENT
Start: 2019-09-05 | End: 2019-09-05

## 2019-09-05 RX ORDER — LIDOCAINE HCL/PF 100 MG/5ML
SYRINGE (ML) INTRAVENOUS
Status: DISCONTINUED | OUTPATIENT
Start: 2019-09-05 | End: 2019-09-05

## 2019-09-05 RX ORDER — BISACODYL 10 MG
10 SUPPOSITORY, RECTAL RECTAL EVERY 12 HOURS PRN
Status: DISCONTINUED | OUTPATIENT
Start: 2019-09-05 | End: 2019-09-05 | Stop reason: HOSPADM

## 2019-09-05 RX ORDER — NALOXONE HCL 0.4 MG/ML
0.02 VIAL (ML) INJECTION
Status: DISCONTINUED | OUTPATIENT
Start: 2019-09-05 | End: 2019-09-05 | Stop reason: HOSPADM

## 2019-09-05 RX ORDER — POLYETHYLENE GLYCOL 3350 17 G/17G
17 POWDER, FOR SOLUTION ORAL DAILY
Status: DISCONTINUED | OUTPATIENT
Start: 2019-09-05 | End: 2019-09-05 | Stop reason: HOSPADM

## 2019-09-05 RX ORDER — MIDAZOLAM HYDROCHLORIDE 1 MG/ML
0.5 INJECTION INTRAMUSCULAR; INTRAVENOUS
Status: DISCONTINUED | OUTPATIENT
Start: 2019-09-05 | End: 2019-09-05 | Stop reason: HOSPADM

## 2019-09-05 RX ORDER — ROPIVACAINE HYDROCHLORIDE 5 MG/ML
INJECTION, SOLUTION EPIDURAL; INFILTRATION; PERINEURAL
Status: COMPLETED | OUTPATIENT
Start: 2019-09-05 | End: 2019-09-05

## 2019-09-05 RX ORDER — SODIUM CHLORIDE 9 MG/ML
INJECTION, SOLUTION INTRAVENOUS CONTINUOUS
Status: DISCONTINUED | OUTPATIENT
Start: 2019-09-05 | End: 2019-09-05 | Stop reason: HOSPADM

## 2019-09-05 RX ORDER — OXYCODONE AND ACETAMINOPHEN 5; 325 MG/1; MG/1
1 TABLET ORAL EVERY 4 HOURS PRN
Qty: 50 TABLET | Refills: 0 | Status: SHIPPED | OUTPATIENT
Start: 2019-09-05 | End: 2019-09-10 | Stop reason: ALTCHOICE

## 2019-09-05 RX ORDER — ACETAMINOPHEN 500 MG
1000 TABLET ORAL EVERY 6 HOURS
Status: DISCONTINUED | OUTPATIENT
Start: 2019-09-05 | End: 2019-09-05 | Stop reason: HOSPADM

## 2019-09-05 RX ORDER — MUPIROCIN 20 MG/G
1 OINTMENT TOPICAL
Status: COMPLETED | OUTPATIENT
Start: 2019-09-05 | End: 2019-09-05

## 2019-09-05 RX ORDER — SODIUM CHLORIDE 0.9 % (FLUSH) 0.9 %
10 SYRINGE (ML) INJECTION
Status: DISCONTINUED | OUTPATIENT
Start: 2019-09-05 | End: 2019-09-05 | Stop reason: HOSPADM

## 2019-09-05 RX ORDER — ACETAMINOPHEN 500 MG
1000 TABLET ORAL
Status: COMPLETED | OUTPATIENT
Start: 2019-09-05 | End: 2019-09-05

## 2019-09-05 RX ORDER — BUPIVACAINE HYDROCHLORIDE 2.5 MG/ML
INJECTION, SOLUTION EPIDURAL; INFILTRATION; INTRACAUDAL
Status: DISCONTINUED
Start: 2019-09-05 | End: 2019-09-05 | Stop reason: HOSPADM

## 2019-09-05 RX ORDER — FENTANYL CITRATE 50 UG/ML
25 INJECTION, SOLUTION INTRAMUSCULAR; INTRAVENOUS EVERY 5 MIN PRN
Status: DISCONTINUED | OUTPATIENT
Start: 2019-09-05 | End: 2019-09-05 | Stop reason: HOSPADM

## 2019-09-05 RX ORDER — ASPIRIN 81 MG/1
81 TABLET ORAL 2 TIMES DAILY
Status: DISCONTINUED | OUTPATIENT
Start: 2019-09-05 | End: 2019-09-05 | Stop reason: HOSPADM

## 2019-09-05 RX ORDER — ASPIRIN 81 MG/1
81 TABLET ORAL 2 TIMES DAILY
Qty: 60 TABLET | Refills: 0 | Status: ON HOLD | OUTPATIENT
Start: 2019-09-05 | End: 2019-11-28 | Stop reason: HOSPADM

## 2019-09-05 RX ORDER — SODIUM CHLORIDE 9 MG/ML
INJECTION, SOLUTION INTRAVENOUS CONTINUOUS PRN
Status: DISCONTINUED | OUTPATIENT
Start: 2019-09-05 | End: 2019-09-05

## 2019-09-05 RX ORDER — MIDAZOLAM HYDROCHLORIDE 1 MG/ML
INJECTION, SOLUTION INTRAMUSCULAR; INTRAVENOUS
Status: DISCONTINUED | OUTPATIENT
Start: 2019-09-05 | End: 2019-09-05

## 2019-09-05 RX ORDER — FAMOTIDINE 20 MG/1
20 TABLET, FILM COATED ORAL 2 TIMES DAILY
Status: DISCONTINUED | OUTPATIENT
Start: 2019-09-05 | End: 2019-09-05 | Stop reason: HOSPADM

## 2019-09-05 RX ORDER — LIDOCAINE HYDROCHLORIDE 10 MG/ML
1 INJECTION, SOLUTION EPIDURAL; INFILTRATION; INTRACAUDAL; PERINEURAL
Status: COMPLETED | OUTPATIENT
Start: 2019-09-05 | End: 2019-09-05

## 2019-09-05 RX ORDER — OXYCODONE HYDROCHLORIDE 5 MG/1
5 TABLET ORAL
Status: DISCONTINUED | OUTPATIENT
Start: 2019-09-05 | End: 2019-09-05 | Stop reason: HOSPADM

## 2019-09-05 RX ORDER — ONDANSETRON 8 MG/1
8 TABLET, ORALLY DISINTEGRATING ORAL EVERY 12 HOURS PRN
Qty: 20 TABLET | Refills: 0 | Status: SHIPPED | OUTPATIENT
Start: 2019-09-05 | End: 2019-11-07

## 2019-09-05 RX ORDER — GLYCOPYRROLATE 0.2 MG/ML
INJECTION INTRAMUSCULAR; INTRAVENOUS
Status: DISCONTINUED | OUTPATIENT
Start: 2019-09-05 | End: 2019-09-05

## 2019-09-05 RX ORDER — AMOXICILLIN 250 MG
1 CAPSULE ORAL 2 TIMES DAILY
Status: DISCONTINUED | OUTPATIENT
Start: 2019-09-05 | End: 2019-09-05 | Stop reason: HOSPADM

## 2019-09-05 RX ORDER — DEXAMETHASONE SODIUM PHOSPHATE 4 MG/ML
INJECTION, SOLUTION INTRA-ARTICULAR; INTRALESIONAL; INTRAMUSCULAR; INTRAVENOUS; SOFT TISSUE
Status: DISCONTINUED | OUTPATIENT
Start: 2019-09-05 | End: 2019-09-05

## 2019-09-05 RX ORDER — CELECOXIB 200 MG/1
400 CAPSULE ORAL
Status: COMPLETED | OUTPATIENT
Start: 2019-09-05 | End: 2019-09-05

## 2019-09-05 RX ORDER — CEFAZOLIN SODIUM 1 G/3ML
2 INJECTION, POWDER, FOR SOLUTION INTRAMUSCULAR; INTRAVENOUS
Status: DISCONTINUED | OUTPATIENT
Start: 2019-09-05 | End: 2019-09-05 | Stop reason: HOSPADM

## 2019-09-05 RX ADMIN — ROPIVACAINE HYDROCHLORIDE: 2 INJECTION, SOLUTION EPIDURAL; INFILTRATION at 12:09

## 2019-09-05 RX ADMIN — SODIUM CHLORIDE: 0.9 INJECTION, SOLUTION INTRAVENOUS at 11:09

## 2019-09-05 RX ADMIN — ACETAMINOPHEN 1000 MG: 500 TABLET ORAL at 07:09

## 2019-09-05 RX ADMIN — FAMOTIDINE 20 MG: 20 TABLET, FILM COATED ORAL at 11:09

## 2019-09-05 RX ADMIN — Medication 5 MG: at 08:09

## 2019-09-05 RX ADMIN — MIDAZOLAM 2 MG: 1 INJECTION INTRAMUSCULAR; INTRAVENOUS at 08:09

## 2019-09-05 RX ADMIN — PROPOFOL 30 MG: 10 INJECTION, EMULSION INTRAVENOUS at 08:09

## 2019-09-05 RX ADMIN — SODIUM CHLORIDE: 0.9 INJECTION, SOLUTION INTRAVENOUS at 07:09

## 2019-09-05 RX ADMIN — SODIUM CHLORIDE: 0.9 INJECTION, SOLUTION INTRAVENOUS at 08:09

## 2019-09-05 RX ADMIN — PROPOFOL 20 MG: 10 INJECTION, EMULSION INTRAVENOUS at 09:09

## 2019-09-05 RX ADMIN — VANCOMYCIN HYDROCHLORIDE 1500 MG: 1.5 INJECTION, POWDER, LYOPHILIZED, FOR SOLUTION INTRAVENOUS at 08:09

## 2019-09-05 RX ADMIN — MEPIVACAINE HYDROCHLORIDE 3 ML: 15 INJECTION, SOLUTION EPIDURAL; INFILTRATION at 08:09

## 2019-09-05 RX ADMIN — SODIUM CHLORIDE, SODIUM GLUCONATE, SODIUM ACETATE, POTASSIUM CHLORIDE, MAGNESIUM CHLORIDE, SODIUM PHOSPHATE, DIBASIC, AND POTASSIUM PHOSPHATE: .53; .5; .37; .037; .03; .012; .00082 INJECTION, SOLUTION INTRAVENOUS at 09:09

## 2019-09-05 RX ADMIN — MIDAZOLAM HYDROCHLORIDE 0.5 MG: 1 INJECTION, SOLUTION INTRAMUSCULAR; INTRAVENOUS at 09:09

## 2019-09-05 RX ADMIN — SODIUM CHLORIDE, SODIUM GLUCONATE, SODIUM ACETATE, POTASSIUM CHLORIDE, MAGNESIUM CHLORIDE, SODIUM PHOSPHATE, DIBASIC, AND POTASSIUM PHOSPHATE: .53; .5; .37; .037; .03; .012; .00082 INJECTION, SOLUTION INTRAVENOUS at 10:09

## 2019-09-05 RX ADMIN — DEXMEDETOMIDINE HYDROCHLORIDE 0.5 MCG/KG/HR: 100 INJECTION, SOLUTION, CONCENTRATE INTRAVENOUS at 08:09

## 2019-09-05 RX ADMIN — PROPOFOL 30 MG: 10 INJECTION, EMULSION INTRAVENOUS at 09:09

## 2019-09-05 RX ADMIN — SENNOSIDES,DOCUSATE SODIUM 1 TABLET: 8.6; 5 TABLET, FILM COATED ORAL at 11:09

## 2019-09-05 RX ADMIN — PREGABALIN 75 MG: 75 CAPSULE ORAL at 07:09

## 2019-09-05 RX ADMIN — DEXMEDETOMIDINE HYDROCHLORIDE 58.5 MCG: 100 INJECTION, SOLUTION, CONCENTRATE INTRAVENOUS at 09:09

## 2019-09-05 RX ADMIN — ROPIVACAINE HYDROCHLORIDE 10 ML: 5 INJECTION, SOLUTION EPIDURAL; INFILTRATION; PERINEURAL at 08:09

## 2019-09-05 RX ADMIN — OXYCODONE HYDROCHLORIDE 10 MG: 5 TABLET ORAL at 03:09

## 2019-09-05 RX ADMIN — GLYCOPYRROLATE 0.2 MG: 0.2 INJECTION, SOLUTION INTRAMUSCULAR; INTRAVENOUS at 08:09

## 2019-09-05 RX ADMIN — OXYCODONE HYDROCHLORIDE 10 MG: 5 TABLET ORAL at 12:09

## 2019-09-05 RX ADMIN — MIDAZOLAM HYDROCHLORIDE 1 MG: 1 INJECTION, SOLUTION INTRAMUSCULAR; INTRAVENOUS at 08:09

## 2019-09-05 RX ADMIN — ONDANSETRON 4 MG: 2 INJECTION INTRAMUSCULAR; INTRAVENOUS at 10:09

## 2019-09-05 RX ADMIN — ASPIRIN 81 MG: 81 TABLET, COATED ORAL at 11:09

## 2019-09-05 RX ADMIN — DEXAMETHASONE SODIUM PHOSPHATE 8 MG: 4 INJECTION, SOLUTION INTRAMUSCULAR; INTRAVENOUS at 08:09

## 2019-09-05 RX ADMIN — Medication 10 MG: at 09:09

## 2019-09-05 RX ADMIN — CELECOXIB 400 MG: 200 CAPSULE ORAL at 07:09

## 2019-09-05 RX ADMIN — LIDOCAINE HYDROCHLORIDE 0.2 MG: 10 INJECTION, SOLUTION EPIDURAL; INFILTRATION; INTRACAUDAL; PERINEURAL at 07:09

## 2019-09-05 RX ADMIN — LIDOCAINE HYDROCHLORIDE 60 MG: 20 INJECTION, SOLUTION INTRAVENOUS at 08:09

## 2019-09-05 RX ADMIN — Medication 25 MG: at 08:09

## 2019-09-05 RX ADMIN — ACETAMINOPHEN 1000 MG: 500 TABLET ORAL at 02:09

## 2019-09-05 RX ADMIN — CEFAZOLIN 2 G: 330 INJECTION, POWDER, FOR SOLUTION INTRAMUSCULAR; INTRAVENOUS at 09:09

## 2019-09-05 RX ADMIN — DEXMEDETOMIDINE HYDROCHLORIDE: 100 INJECTION, SOLUTION, CONCENTRATE INTRAVENOUS at 09:09

## 2019-09-05 RX ADMIN — MUPIROCIN 1 G: 20 OINTMENT TOPICAL at 07:09

## 2019-09-05 NOTE — INTERVAL H&P NOTE
Elmer Borrero was interviewed, examined and the H and P reviewed.  There has been no interval change in his History and Physical.

## 2019-09-05 NOTE — ANESTHESIA PROCEDURE NOTES
Adductor Canal Catheter    Patient location during procedure: pre-op   Block not for primary anesthetic.  Reason for block: at surgeon's request and post-op pain management   Post-op Pain Location: Right Knee  Start time: 9/5/2019 8:13 AM  Timeout: 9/5/2019 8:12 AM   End time: 9/5/2019 8:28 AM    Staffing  Authorizing Provider: Andrea Rosa MD  Performing Provider: Shahab Peter MD    Preanesthetic Checklist  Completed: patient identified, site marked, surgical consent, pre-op evaluation, timeout performed, IV checked, risks and benefits discussed and monitors and equipment checked  Peripheral Block  Patient position: supine  Prep: ChloraPrep and site prepped and draped  Patient monitoring: heart rate, cardiac monitor, continuous pulse ox, continuous capnometry and frequent blood pressure checks  Block type: adductor canal  Laterality: right  Injection technique: continuous  Needle  Needle type: Tuohy   Needle gauge: 17 G  Needle length: 3.5 in  Needle localization: anatomical landmarks and ultrasound guidance  Catheter type: spring wound  Catheter size: 19 G  Test dose: lidocaine 1.5% with Epi 1-to-200,000 and negative   -ultrasound image captured on disc.  Assessment  Injection assessment: negative aspiration, negative parasthesia and local visualized surrounding nerve  Paresthesia pain: none  Heart rate change: no  Slow fractionated injection: yes  Additional Notes  VSS.  [facility] RN monitoring vitals throughout procedure.  Patient tolerated procedure well. Diluted to 0.25% Ropi total 20cc +1:300k Epi

## 2019-09-05 NOTE — TRANSFER OF CARE
Anesthesia Transfer of Care Note    Patient: Elmer Borrero    Procedure(s) Performed: Procedure(s) (LRB):  ARTHROPLASTY, KNEE, TOTAL-VINCENZO-SAME DAY (Right)    Patient location: PACU    Anesthesia Type: general    Transport from OR: Transported from OR on 6-10 L/min O2 by face mask with adequate spontaneous ventilation    Post pain: adequate analgesia    Post assessment: no apparent anesthetic complications and tolerated procedure well    Post vital signs: stable    Level of consciousness: responds to stimulation and sedated    Nausea/Vomiting: no nausea/vomiting    Complications: none    Transfer of care protocol was followed      Last vitals:   Visit Vitals  BP 85/54 (BP Location: Right arm, Patient Position: Lying)   Pulse 54   Temp 36.6 °C (97.9 °F) (Oral)   Resp 18           SpO2 100%

## 2019-09-05 NOTE — OP NOTE
DATE OF PROCEDURE: 9/5/19  PREOPERATIVE DIAGNOSIS: Arthritis, Right knee. Morbid obesity BMI 40  POSTOPERATIVE DIAGNOSIS: Arthritis, Right knee. Morbid obesity BMI 40  PROCEDURES PERFORMED: Right total knee arthroplasty.   SURGEON: Arturo Schultz M.D.   ASSISTANT: Liliane Solorio PA-C (due to the fact that there was no available   qualified resident, Physician's Assistant Liliane Solorio acted as first   assistant during this case).   ANESTHESIA: Regional.   COMPLICATIONS: None.   COUNTS: Correct.   DISPOSITION: Recovery Room, stable.   SPECIMENS: Bone and cartilage.   FINDINGS: Tricompartmental degenerative change.    FLUIDS: 2000 mL.   BLOOD LOSS: Less than 50 mL.   IMPLANTS: [place] [employer], size 5 Right posterior stabilized   cemented femoral component with a 5 cemented tibial tray, a 31 mm 3-peg   patella and a size 5, 13 mm TS polyethylene insert.   INDICATIONS FOR PROCEDURE: Elmer Borrero is a 64-year-old male who has   severe arthritis in the Right knee . He is having continued pain in the   Right knee and did not respond to nonoperative measures, decided to undergo   Right knee replacement. He is aware of reasonable treatment options as   well as risks and benefits. {He did not respond to NSAIDS or injections. He received a course or pre-operative physical therapy.  PROCEDURE IN DETAIL: After appropriate consent was obtained, the patient   Was brought in the Operating Room, anesthesia was administered. He received   antibiotic prophylaxis. Cast   padding and tourniquet was applied to the proximal Right thigh. Right  lower extremity was prepped and draped in usual sterile fashion. Limb was   elevated, tourniquet was inflated. The knee was flexed. An incision was   made from the tibial tubercle just proximal to the superior pole of the   patella. It was taken down through the skin and retinacular. A medial   parapatellar arthrotomy was performed followed by a standard medial   release. Patellar fat  pad was partially excised. ACL was resected.   Femoral punch was used to make the guide hole for the femoral drill. The   distal cutting guide was set at 6 degrees valgus right.  A standard distal femoral cut was made.We were pleased with the alignment and quality of the cut.  The PCL retractor was used to bring   the tibia into the field. Meniscal remnants were resected. The   extramedullary tibial guide was pinned in place using the medial side, as most   defective, 2 mm bone was taken off of this. We checked the alignment in   both planes both before and after making the cut. We were satisfied with the   alignment of the cut and the amount of bone removed.The femur was then  sized, found to be a size 5. A size 5 cutting guide was pinned in 3   degrees of external rotation. This was based off the posterior condyle,   checked the transepicondylar axis. Anterior, posterior and chamfer cuts   were made. The box guide was pinned in position. Femoral box cut was   made. Bone fragments were removed. Lamina spreaders were   then used to open up posteriorly. The PCL was resected and some soft   tissue debris was removed.  A 13 mm spacer block gave excellent flexion-extension gap balance.   I was also satisfied with the medial and lateral stability. At this   point, the femoral trial was placed. The tibia was sized, found to be a   Size 5. A size 5 tibial trial was pinned in appropriate alignment and   rotation. This was based on the medial one third of the tibial tubercle.  A stem extension hole was drilled. A keel hole was punched and a   trial reduction was performed with a size 5, 13 mm insert. He  achieved full   extension. There was no recurvatum. He easily had 130 degrees of flexion.   The femoral component ranged symmetrically in the tibial tray. There was   no lift off. His LCL was slightly lax and I opted for a TS insert.There was no instability of full extension, 30 degrees of   flexion, mid flexion and full  flexion. Patella was measured and found to   be 24 mm thick, 8 mm of bone removed. The 3-peg holes were drilled 31 mm   3-peg trial was placed. The knee was brought through range of motion. The   patella tracked extremely well. Therefore, at this point, we were   satisfied with knee range of motion and stability, component position,   sizing and alignment as well as patella tracking. All trial components   were removed. Bone was prepared for cementing by pulsatile lavage and   drying. Components were cemented into place, femur followed by tibia.   Meticulous care was taken to remove excess cement. Tibial insert was   firmly seated in the tibial tray. The knee was inspected. There was no   loose body, foreign body or soft tissue interposition. Knee was then   reduced, brought into extension. Patella button was cemented in place.   Excess cement was removed. The wound was then copiously irrigated with   antibiotic-impregnated solution. Once the cement was dried, tranexamic   acid was applied. The arthrotomy was closed with #1 Vicryl. Once the   arthrotomy was closed, the knee was brought through range of motion, stable   as previously described. Patella tracked very well. Retinacular was   closed with 0 Vicryl, subcutaneous layer with 2-0 Vicryl, skin with   monocryl and dermabond. Sterile dressing was applied. Tourniquet was let down.  He was   transferred to a stretcher, brought to Recovery Room in stable condition,   tolerated the procedure well, no known complications.

## 2019-09-05 NOTE — DISCHARGE INSTRUCTIONS
Recovery After Procedural Sedation (Adult)  You have been given medicine by vein to make you sleep during your surgery. This may have included both a pain medicine and sleeping medicine. Most of the effects have worn off. But you may still have some drowsiness for the next 6 to 8 hours.  Home care  Follow these guidelines when you get home:  · For the next 8 hours, you should be watched by a responsible adult. This person should make sure your condition is not getting worse.  · Don't drink any alcohol for the next 24 hours.  · Don't drive, operate dangerous machinery, or make important business or personal decisions during the next 24 hours.  Note: Your healthcare provider may tell you not to take any medicine by mouth for pain or sleep in the next 4 hours. These medicines may react with the medicines you were given in the hospital. This could cause a much stronger response than usual.  Follow-up care  Follow up with your healthcare provider if you are not alert and back to your usual level of activity within 12 hours.  When to seek medical advice  Call your healthcare provider right away if any of these occur:  · Drowsiness gets worse  · Weakness or dizziness gets worse  · Repeated vomiting  · You can't be awakened   Date Last Reviewed: 10/18/2016  © 3730-5290 [employer]. [address] [place] [address], [place] [address]. All rights reserved. This information is not intended as a substitute for professional medical care. Always follow your healthcare professional's instructions.

## 2019-09-05 NOTE — OPERATIVE NOTE ADDENDUM
Certification of Assistant at Surgery       Surgery Date: 9/5/2019     Participating Surgeons:  Surgeon(s) and Role:     * Arturo Schultz MD - Primary    Procedures:  Procedure(s) (LRB):  ARTHROPLASTY, KNEE, TOTAL-VINCENZO-SAME DAY (Right)    Assistant Surgeon's Certification of Necessity:  I understand that section 1842 (b) (6) (d) of the Social Security Act generally prohibits Medicare Part B reasonable charge payment for the services of assistants at surgery in teaching hospitals when qualified residents are available to furnish such services. I certify that the services for which payment is claimed were medically necessary, and that no qualified resident was available to perform the services. I further understand that these services are subject to post-payment review by the Medicare carrier.      Liliane Solorio PA-C    09/05/2019  11:16 AM

## 2019-09-05 NOTE — ANESTHESIA PROCEDURE NOTES
RUTH SS    Patient location during procedure: pre-op   Block not for primary anesthetic.  Reason for block: at surgeon's request and post-op pain management   Post-op Pain Location: Right Knee  Start time: 9/5/2019 8:29 AM  Timeout: 9/5/2019 8:28 AM   End time: 9/5/2019 8:32 AM    Staffing  Authorizing Provider: Norman Escamilla MD  Performing Provider: Shahab Peter MD    Preanesthetic Checklist  Completed: patient identified, site marked, surgical consent, pre-op evaluation, timeout performed, IV checked, risks and benefits discussed and monitors and equipment checked  Peripheral Block  Patient position: supine  Prep: ChloraPrep  Patient monitoring: heart rate, cardiac monitor, continuous pulse ox, continuous capnometry and frequent blood pressure checks  Block type: I PACK  Laterality: right  Injection technique: single shot  Needle  Needle type: Stimuplex   Needle gauge: 21 G  Needle length: 4 in  Needle localization: anatomical landmarks and ultrasound guidance   -ultrasound image captured on disc.  Assessment  Injection assessment: negative aspiration, negative parasthesia and local visualized surrounding nerve  Paresthesia pain: none  Heart rate change: no  Slow fractionated injection: yes  Additional Notes  VSS.  [facility] RN monitoring vitals throughout procedure.  Patient tolerated procedure well. Ropi diluted to 0.25% +1:300k Epi total of 20cc

## 2019-09-05 NOTE — PT/OT/SLP EVAL
Physical Therapy Evaluation and Discharge Note    Patient Name:  Elmer Borrero   MRN:  [id number]    Recommendations:     Discharge Recommendations:  outpatient PT   Discharge Equipment Recommendations: none   Barriers to discharge: None    Assessment:     Elmer Borrero is a 64 y.o. male admitted with a medical diagnosis of S/P knee replacement. Patient tolerated PT evaluation fairly well today. He ambulated 50ft with SW and CGA. He was limited in ambulation distance due to 10/10 right quad pain. Patient ascended/descended 1 step with B HR's and CGA. He was provided with HEP for R LE therex. He is ready for discharge home from PT standpoint.     Recent Surgery: Procedure(s) (LRB):  ARTHROPLASTY, KNEE, TOTAL-VINCENZO-SAME DAY (Right) Day of Surgery    Plan:     During this hospitalization, patient does not require further acute PT services.  Please re-consult if situation changes.      Subjective     Chief Complaint: 10/10 pain in right quad while walking.   Patient/Family Comments/goals: To walk without pain.   Pain/Comfort:  · Pain Rating 1: 10/10  · Location - Side 1: Right  · Location - Orientation 1: lateral  · Location 1: (quad)  · Pain Addressed 1: Pre-medicate for activity, Distraction, Reposition, Nurse notified, Cessation of Activity    Living Environment:  Patient lives with his wife and 17 y.o. granddaughter in a [facility] with 1 threshold to enter. Prior to admission, patients level of function was independent. Equipment used at home: walker, rolling, bedside commode. Upon discharge, patient will have assistance from wife and granddaughter.    Objective:     Communicated with RN prior to session.  Patient found supine with blood pressure cuff, cryotherapy, FCD, peripheral IV, telemetry, pulse ox (continuous), perineural catheter upon PT entry to room.    General Precautions: Standard, fall   Orthopedic Precautions:RLE weight bearing as tolerated   Braces: N/A     Exams:  · Cognitive Exam:   Patient is oriented to Person, Place, Time and Situation  · Sensation: intact  · RUE ROM: WFL  · RUE Strength: WFL  · LUE ROM: WFL  · LUE Strength: WFL  · RLE ROM: WFL except limited due to bulky surgical dressing  · RLE Strength: appears WFL but limited due to pain at knee  · LLE ROM: WFL  · LLE Strength: WFL    Functional Mobility:  · Bed Mobility:     · Supine to Sit: supervision  · Sit to Supine: supervision  · Transfers:     · Sit to Stand:  contact guard assistance with standard walker  · Gait:   He ambulated 50ft with SW and CGA. He was limited in ambulation distance due to 10/10 right quad pain. Patient demonstrated decreased deidra, decreased velocity of limb motion and decreased step length during gait due to impaired balance, pain and decreased ROM.  ·   · Stairs:  Pt ascended/descended 1 stair with bilateral handrails with Contact Guard Assistance.     AM-PAC 6 CLICK MOBILITY  Total Score:23     Therapeutic Activities and Exercises:   Patient educated in:  -PT role and POC  -safety with transfers including hand placement  -gait sequencing and SW management  -OOB activity to maximize recovery   -car transfer  -stair training  -HEP for therex at home with handout provided  -polar ice use       Patient left supine with all lines intact and RN notified.    GOALS:   Multidisciplinary Problems     Physical Therapy Goals     Not on file          Multidisciplinary Problems (Resolved)        Problem: Physical Therapy Goal    Goal Priority Disciplines Outcome Goal Variances Interventions   Physical Therapy Goal   (Resolved)     PT, PT/OT Outcome(s) achieved                     History:     Past Medical History:   Diagnosis Date    Actinic keratosis     Arthritis     BPH (benign prostatic hyperplasia)     Cataract     Hyperlipidemia     Hypertension     Kidney stone     GEOVANNA on CPAP     Renal calculi     Subclinical hypothyroidism        Past Surgical History:   Procedure Laterality Date    COLONOSCOPY  N/A 9/8/2015    Performed by Bob Mirza MD at [facility] ENDO (4TH FLR)    CRYOTHERAPY, NERVE, PERIPHERAL, PERCUTANEOUS, USING LIQUID NITROUS OXIDE IN CLOSED NEEDLE DEVICE Right 8/26/2019    Performed by CHYNA Quevedo at [facility] CATH LAB    JOINT REPLACEMENT      left knee    KNEE SURGERY      SPINE SURGERY      around 2010        Time Tracking:     PT Received On: 09/05/19  PT Start Time: 1500     PT Stop Time: 1525  PT Total Time (min): 25 min     Billable Minutes: Evaluation 10 and Gait Training 15      Cindy Turk, PT  09/05/2019

## 2019-09-05 NOTE — PROGRESS NOTES
Surgical home notified pt c/o pain to the top of the knee, awaiting Qball to start infusion. MD states will come to bedside. Will continue to monitor.

## 2019-09-05 NOTE — ANESTHESIA PROCEDURE NOTES
Spinal    Diagnosis: arthritis  Patient location during procedure: OR  Start time: 9/5/2019 8:43 AM  Timeout: 9/5/2019 8:41 AM  End time: 9/5/2019 8:45 AM    Staffing  Authorizing Provider: Rehan Aldana MD  Performing Provider: Rehan Aldana MD    Preanesthetic Checklist  Completed: patient identified, site marked, surgical consent, pre-op evaluation, timeout performed, IV checked, risks and benefits discussed and monitors and equipment checked  Spinal Block  Patient position: sitting  Prep: ChloraPrep  Patient monitoring: heart rate, cardiac monitor and continuous pulse ox  Approach: midline  Location: L4-5  Injection technique: single shot  CSF Fluid: clear free-flowing CSF  Needle  Needle type: pencil-tip   Needle gauge: 25 G  Needle length: 3.5 in  Additional Documentation: incremental injection, negative aspiration for heme and no paresthesia on injection  Needle localization: anatomical landmarks  Assessment  Ease of block: easy  Patient's tolerance of the procedure: no complaints and comfortable throughout block

## 2019-09-05 NOTE — PROGRESS NOTES
Called to bedside in PACU for pt c/o right anterior knee pain.  Bolused pt 8 mL 0.25% bupivacaine and pt connected to On-Q ball.

## 2019-09-05 NOTE — PLAN OF CARE
Problem: Physical Therapy Goal  Goal: Physical Therapy Goal  Outcome: Outcome(s) achieved Date Met: 09/05/19    Patient tolerated PT evaluation fairly well today. He ambulated 50ft with SW and CGA. He was limited in ambulation distance due to 10/10 right quad pain. Patient ascended/descended 1 step with B HR's and CGA. He was provided with HEP for R LE therex. He is ready for discharge home from PT standpoint.

## 2019-09-06 ENCOUNTER — TELEPHONE (OUTPATIENT)
Dept: ORTHOPEDICS | Facility: CLINIC | Age: 64
End: 2019-09-06

## 2019-09-06 NOTE — ANESTHESIA POST-OP PAIN MANAGEMENT
Spoke with patient over the phone.  He reports knee pain well-controlled at this time, but has intermittent quadriceps pain.  Encouraged pt to participate with physical therapy.  Re-iterated plan to d/c On-Q PNC tomorrow.  Patient verbalized understanding.

## 2019-09-06 NOTE — TELEPHONE ENCOUNTER
Spoke to patient. He is home. Doing well. Pain controlled. Dressing intact. Has outpatient PT Monday. Reviewed elevation, proper use of medications. He will call us with any concerns.

## 2019-09-06 NOTE — DISCHARGE SUMMARY
[facility]  Orthopedics  Discharge Summary      Patient Name: Elmer Borrero  MRN: [id number]  Admission Date: 9/5/2019  Hospital Length of Stay: 0 days  Discharge Date and Time: 9/5/2019  5:00 PM  Attending Physician: Arturo Schultz MD  Discharging Provider: Tressa Devlin MD  Primary Care Provider: Dae Romo MD    HPI:   Elmer Borrero is a 64 y.o. male with right knee osteoarthritis.    Procedure(s) (LRB):  ARTHROPLASTY, KNEE, TOTAL-VINCENZO-SAME DAY (Right)      Hospital Course:  Patient presented for above procedure.  Tolerated it well and was discharged home POD0 after voiding, tolerating diet, ambulating, pain controlled.  Discharge instructions, follow-up appointment, and med rec are below.        Significant Diagnostic Studies: No pertinent studies.    Pending Diagnostic Studies:     None        Final Active Diagnoses:    Diagnosis Date Noted POA    PRINCIPAL PROBLEM:  S/P knee replacement [Z96.659] 09/05/2019 Not Applicable      Problems Resolved During this Admission:      Discharged Condition: good    Disposition: Home or Self Care    Follow Up: F/u with Renu Mcgee NP in 2 weeks.    Patient Instructions:      Activity as tolerated     Call MD for:  difficulty breathing, headache or visual disturbances     Call MD for:  extreme fatigue     Call MD for:  hives     Call MD for:  persistent dizziness or light-headedness     Call MD for:  persistent nausea and vomiting     Call MD for:  redness, tenderness, or signs of infection (pain, swelling, redness, odor or green/yellow discharge around incision site)     Call MD for:  severe uncontrolled pain     Call MD for:  temperature >100.4     Keep surgical extremity elevated when not ambulating     Leave dressing on - Keep it clean, dry, and intact until clinic visit   Order Comments: Do not remove surgical dressing for 2 weeks post-op. This will be done only by MD at initial post-op visit. If dressing is  completely saturated, replace with identical dressing - silver-impregnated hydrocolloid dressing.     Do not get dressings wet. Do not shower.     If dressing continues to be saturated or there are signs of infection, please call [facility] [phone] for further instructions and to make appt to be seen.     Lifting restrictions   Order Comments: No strenuous exercise or lifting of > 10 lbs     No driving, operating heavy equipment or signing legal documents while taking pain medication     Sponge bath only until clinic visit     Weight bearing as tolerated     Medications:  Reconciled Home Medications:      Medication List      START taking these medications    ondansetron 8 MG Tbdl  Commonly known as:  ZOFRAN-ODT  Dissolve 1 tablet (8 mg total) by mouth every 12 (twelve) hours as needed (nausea).     oxyCODONE-acetaminophen 5-325 mg per tablet  Commonly known as:  PERCOCET  Take 1 tablet by mouth every 4 (four) hours as needed for Pain.     STOOL SOFTENER 100 MG capsule  Generic drug:  docusate sodium  Take 1 capsule (100 mg total) by mouth 2 (two) times daily as needed for Constipation.        CHANGE how you take these medications    aspirin 81 MG EC tablet  Commonly known as:  ECOTRIN  Take 1 tablet (81 mg total) by mouth 2 (two) times daily.  What changed:  when to take this        CONTINUE taking these medications    allopurinol 300 MG tablet  Commonly known as:  ZYLOPRIM  Take 1 tablet (300 mg total) by mouth once daily.        ASK your doctor about these medications    CLARITIN 10 mg tablet  Generic drug:  loratadine  Take 1 tablet by mouth daily as needed.     clotrimazole 1 % cream  Commonly known as:  LOTRIMIN  Apply topically 2 (two) times daily.     cyclobenzaprine 10 MG tablet  Commonly known as:  FLEXERIL  TAKE 1 TABLET BY MOUTH 3 TIMES A DAY AS NEEDED FOR MUSCLE SPASMS     finasteride 5 mg tablet  Commonly known as:  PROSCAR  TAKE 1 TABLET BY MOUTH EVERY DAY     ketoconazole 2 % cream  Commonly  known as:  NIZORAL  Apply topically 2 (two) times daily.     KRILL OIL [id number] mg Cap  Generic drug:  krill-om-3-dha-epa-phospho-ast  Take 2 tablets by mouth.     LOTRIMIN ULTRA TOP  Apply topically. Rash     montelukast 10 mg tablet  Commonly known as:  SINGULAIR  TAKE 1 TABLET EVERY EVENING     tamsulosin 0.4 mg Cap  Commonly known as:  FLOMAX  TAKE 1 CAPSULE DAILY     telmisartan-hydrochlorothiazide 80-25 mg per tablet  Commonly known as:  MICARDIS HCT  Take 1 tablet by mouth once daily.            Tressa Devlin MD  Orthopedics  [facility]

## 2019-09-07 NOTE — ANESTHESIA POST-OP PAIN MANAGEMENT
9/7/2019     Called and spoke to patient about OnQ PNC. Pain reasonably well controlled. Patient inquired about refill of opioid prescription. Recommended he call the orthopedic clinic on Monday to discuss further. Denies tinnitus, metallic taste in mouth, weakness in extremity.  Denies erythema, warmth, tenderness, bleeding at site of catheter entry. Dressing c/d/i. All questions answered. Encouraged patient to call the provided number if any issues arise. Will call again tomorrow for removal on day 3 as he was a same-day TKA.     Brandon Chavarria MD  PGY-3/CA-2  [phone] / [phone]

## 2019-09-08 NOTE — ANESTHESIA POST-OP PAIN MANAGEMENT
09/08/2019     Spoke with patient today. Reports adequate pain control. PNC removed with no complications; blue tip intact. Questions answered and concerns addressed.     Benjamin Durham, CA-2  [facility]

## 2019-09-09 PROBLEM — Z74.09 IMPAIRED FUNCTIONAL MOBILITY, BALANCE, GAIT, AND ENDURANCE: Status: RESOLVED | Noted: 2019-08-12 | Resolved: 2019-09-09

## 2019-09-09 PROBLEM — M25.561 KNEE PAIN, RIGHT: Status: RESOLVED | Noted: 2019-08-12 | Resolved: 2019-09-09

## 2019-09-10 ENCOUNTER — TELEPHONE (OUTPATIENT)
Dept: ORTHOPEDICS | Facility: CLINIC | Age: 64
End: 2019-09-10

## 2019-09-10 DIAGNOSIS — Z96.659 STATUS POST KNEE REPLACEMENT, UNSPECIFIED LATERALITY: Primary | ICD-10-CM

## 2019-09-10 RX ORDER — OXYCODONE AND ACETAMINOPHEN 10; 325 MG/1; MG/1
1 TABLET ORAL EVERY 4 HOURS PRN
Qty: 42 TABLET | Refills: 0 | Status: SHIPPED | OUTPATIENT
Start: 2019-09-10 | End: 2019-09-27 | Stop reason: SDUPTHER

## 2019-09-10 NOTE — TELEPHONE ENCOUNTER
----- Message from Preethi Chowdhury MA sent at 9/10/2019 10:50 AM CDT -----  Contact: Wife - Faith      ----- Message -----  From: Elías Trotter  Sent: 9/10/2019  10:45 AM  To: [facility] Sears Staff    Wife states that the pain medication is not helping at all. Ask if can increase the dosage or prescribe something else ask for a call      Contact info: [phone]

## 2019-09-10 NOTE — ANESTHESIA POSTPROCEDURE EVALUATION
Anesthesia Post Evaluation    Patient: Elmer Borrero    Procedure(s) Performed: Procedure(s) (LRB):  ARTHROPLASTY, KNEE, TOTAL-VINCENZO-SAME DAY (Right)    Final Anesthesia Type: spinal  Patient location during evaluation: PACU  Patient participation: Yes- Able to Participate  Level of consciousness: awake and alert and oriented  Post-procedure vital signs: reviewed and stable  Pain management: adequate  Airway patency: patent  PONV status at discharge: No PONV  Anesthetic complications: no      Cardiovascular status: blood pressure returned to baseline  Respiratory status: unassisted, spontaneous ventilation and room air  Hydration status: euvolemic  Follow-up not needed.                                              Event Time     Out of Recovery 09/05/2019 15:35:48          Pain/John Score: No data recorded

## 2019-09-10 NOTE — TELEPHONE ENCOUNTER
Returned call to patient and spoke to his wife. He's not getting any relief with his pain medication. Adjustments made. Will f/u on Friday.

## 2019-09-10 NOTE — PROGRESS NOTES
Spoke to patient's wife and he's not having any relief with the percocet 5/325mg every 4 hours. Will increase to percocet 10/325mg every 4 hours. If no relief, she will let me know by Friday.

## 2019-09-19 ENCOUNTER — OFFICE VISIT (OUTPATIENT)
Dept: ORTHOPEDICS | Facility: CLINIC | Age: 64
End: 2019-09-19
Payer: MEDICARE

## 2019-09-19 VITALS
WEIGHT: 257.94 LBS | TEMPERATURE: 79 F | HEART RATE: 97 BPM | BODY MASS INDEX: 40.48 KG/M2 | HEIGHT: 67 IN | SYSTOLIC BLOOD PRESSURE: 174 MMHG | DIASTOLIC BLOOD PRESSURE: 91 MMHG

## 2019-09-19 DIAGNOSIS — Z96.651 STATUS POST TOTAL RIGHT KNEE REPLACEMENT USING CEMENT: Primary | ICD-10-CM

## 2019-09-19 PROCEDURE — 99024 POSTOP FOLLOW-UP VISIT: CPT | Mod: POP,,, | Performed by: NURSE PRACTITIONER

## 2019-09-19 PROCEDURE — 99999 PR PBB SHADOW E&M-EST. PATIENT-LVL III: ICD-10-PCS | Mod: PBBFAC,,, | Performed by: NURSE PRACTITIONER

## 2019-09-19 PROCEDURE — 99999 PR PBB SHADOW E&M-EST. PATIENT-LVL III: CPT | Mod: PBBFAC,,, | Performed by: NURSE PRACTITIONER

## 2019-09-19 PROCEDURE — 99024 PR POST-OP FOLLOW-UP VISIT: ICD-10-PCS | Mod: POP,,, | Performed by: NURSE PRACTITIONER

## 2019-09-19 PROCEDURE — 99213 OFFICE O/P EST LOW 20 MIN: CPT | Mod: PBBFAC | Performed by: NURSE PRACTITIONER

## 2019-09-19 NOTE — PROGRESS NOTES
"Elmer Borrero presents for initial post-operative visit following a right total knee arthroplasty performed by Dr. Schultz on 9/5/2019. Tolerating pain medication well.      Exam:   Blood pressure (!) 174/91, pulse 97, temperature (!) 79 °F (26.1 °C), height 5' 7" (1.702 m), weight 117 kg (257 lb 15 oz).   Ambulating well with assistive device- rolling walker from the garage.  Incision is clean and dry without drainage or erythema. ROM:0-90    Initial post-operative radiographs reviewed today revealing a well fixed and aligned prosthesis.    A/P:  2 weeks s/p right total knee replacement  - The patient was advised to keep the incision clean and dry for the next 24 hours after which he may wash the area with antibacterial soap in the shower. Will not submerge until the incision is completely healed.   - Outpatient PT: ongoing in [place]  - Continue aspirin for 1 month from surgery.  - Pain medication: refill not needed  - Follow up in 4 weeks with me with xray. Pt will call clinic with problems/concerns.     "

## 2019-09-27 DIAGNOSIS — Z96.659 STATUS POST KNEE REPLACEMENT, UNSPECIFIED LATERALITY: ICD-10-CM

## 2019-09-27 RX ORDER — OXYCODONE AND ACETAMINOPHEN 10; 325 MG/1; MG/1
1 TABLET ORAL EVERY 4 HOURS PRN
Qty: 42 TABLET | Refills: 0 | Status: SHIPPED | OUTPATIENT
Start: 2019-09-27 | End: 2019-11-07

## 2019-10-02 ENCOUNTER — LAB VISIT (OUTPATIENT)
Dept: LAB | Facility: HOSPITAL | Age: 64
End: 2019-10-02
Payer: MEDICARE

## 2019-10-02 ENCOUNTER — OFFICE VISIT (OUTPATIENT)
Dept: HEPATOLOGY | Facility: CLINIC | Age: 64
End: 2019-10-02
Payer: MEDICARE

## 2019-10-02 VITALS
DIASTOLIC BLOOD PRESSURE: 77 MMHG | BODY MASS INDEX: 38.9 KG/M2 | TEMPERATURE: 99 F | SYSTOLIC BLOOD PRESSURE: 158 MMHG | OXYGEN SATURATION: 98 % | WEIGHT: 256.63 LBS | RESPIRATION RATE: 18 BRPM | HEIGHT: 68 IN | HEART RATE: 86 BPM

## 2019-10-02 DIAGNOSIS — R74.8 ELEVATED LIVER ENZYMES: Primary | ICD-10-CM

## 2019-10-02 DIAGNOSIS — F10.10 EXCESSIVE DRINKING ALCOHOL: ICD-10-CM

## 2019-10-02 DIAGNOSIS — I10 ESSENTIAL HYPERTENSION: ICD-10-CM

## 2019-10-02 DIAGNOSIS — K76.0 FATTY LIVER: ICD-10-CM

## 2019-10-02 DIAGNOSIS — E66.01 MORBID OBESITY WITH BMI OF 40.0-44.9, ADULT: ICD-10-CM

## 2019-10-02 DIAGNOSIS — R74.8 ELEVATED LIVER ENZYMES: ICD-10-CM

## 2019-10-02 LAB
ALBUMIN SERPL BCP-MCNC: 4.2 G/DL (ref 3.5–5.2)
ALP SERPL-CCNC: 67 U/L (ref 55–135)
ALT SERPL W/O P-5'-P-CCNC: 34 U/L (ref 10–44)
AST SERPL-CCNC: 20 U/L (ref 10–40)
BILIRUB DIRECT SERPL-MCNC: 0.1 MG/DL (ref 0.1–0.3)
BILIRUB SERPL-MCNC: 0.4 MG/DL (ref 0.1–1)
FERRITIN SERPL-MCNC: 403 NG/ML (ref 20–300)
HBV CORE AB SERPL QL IA: NEGATIVE
HBV SURFACE AB SER-ACNC: NEGATIVE M[IU]/ML
HEPATITIS A ANTIBODY, IGG: POSITIVE
INR PPP: 0.9 (ref 0.8–1.2)
IRON SERPL-MCNC: 83 UG/DL (ref 45–160)
PROT SERPL-MCNC: 8.4 G/DL (ref 6–8.4)
PROTHROMBIN TIME: 9.9 SEC (ref 9–12.5)
SATURATED IRON: 28 % (ref 20–50)
TOTAL IRON BINDING CAPACITY: 296 UG/DL (ref 250–450)
TRANSFERRIN SERPL-MCNC: 200 MG/DL (ref 200–375)

## 2019-10-02 PROCEDURE — 86038 ANTINUCLEAR ANTIBODIES: CPT

## 2019-10-02 PROCEDURE — 83540 ASSAY OF IRON: CPT

## 2019-10-02 PROCEDURE — 85610 PROTHROMBIN TIME: CPT

## 2019-10-02 PROCEDURE — 86235 NUCLEAR ANTIGEN ANTIBODY: CPT

## 2019-10-02 PROCEDURE — 80076 HEPATIC FUNCTION PANEL: CPT

## 2019-10-02 PROCEDURE — 36415 COLL VENOUS BLD VENIPUNCTURE: CPT

## 2019-10-02 PROCEDURE — 99999 PR PBB SHADOW E&M-EST. PATIENT-LVL V: ICD-10-PCS | Mod: PBBFAC,,, | Performed by: NURSE PRACTITIONER

## 2019-10-02 PROCEDURE — 82390 ASSAY OF CERULOPLASMIN: CPT

## 2019-10-02 PROCEDURE — 86790 VIRUS ANTIBODY NOS: CPT

## 2019-10-02 PROCEDURE — 82784 ASSAY IGA/IGD/IGG/IGM EACH: CPT | Mod: 59

## 2019-10-02 PROCEDURE — 86706 HEP B SURFACE ANTIBODY: CPT

## 2019-10-02 PROCEDURE — 86256 FLUORESCENT ANTIBODY TITER: CPT | Mod: 91

## 2019-10-02 PROCEDURE — 99214 OFFICE O/P EST MOD 30 MIN: CPT | Mod: S$PBB,,, | Performed by: NURSE PRACTITIONER

## 2019-10-02 PROCEDURE — 99215 OFFICE O/P EST HI 40 MIN: CPT | Mod: PBBFAC | Performed by: NURSE PRACTITIONER

## 2019-10-02 PROCEDURE — 82103 ALPHA-1-ANTITRYPSIN TOTAL: CPT

## 2019-10-02 PROCEDURE — 82728 ASSAY OF FERRITIN: CPT

## 2019-10-02 PROCEDURE — 86704 HEP B CORE ANTIBODY TOTAL: CPT

## 2019-10-02 PROCEDURE — 99214 PR OFFICE/OUTPT VISIT, EST, LEVL IV, 30-39 MIN: ICD-10-PCS | Mod: S$PBB,,, | Performed by: NURSE PRACTITIONER

## 2019-10-02 PROCEDURE — 82784 ASSAY IGA/IGD/IGG/IGM EACH: CPT

## 2019-10-02 PROCEDURE — 80321 ALCOHOLS BIOMARKERS 1OR 2: CPT

## 2019-10-02 PROCEDURE — 99999 PR PBB SHADOW E&M-EST. PATIENT-LVL V: CPT | Mod: PBBFAC,,, | Performed by: NURSE PRACTITIONER

## 2019-10-02 NOTE — PATIENT INSTRUCTIONS
1. Fibroscan tomorrow look for fat or scar tissue in the liver  2. Will check immunity markers for Hepatitis A and B and arrange for vaccination if needed  3. Labs today to rule out other causes of liver disease. I will send you the results of your labs when they are all resulted, which is typically 1 week after your visit  4.  Follow up pending results of above  5. Continue NO all alcohol use, as that is likely causing significant fat in the liver    There is no FDA approved therapy for non-alcoholic fatty liver disease. Therefore, these things are important:  1. Weight loss goal of 25-35 lbs  2. Low carb/sugar, high fiber and protein diet  3. Exercise, 5 days per week, 30 minutes per day, as tolerated  4. Recommend good cholesterol, blood pressure, blood sugar levels     In some people, fatty liver can progress to steatohepatitis (inflamatory fatty liver) and possibly to cirrhosis, putting one at increased risk for liver cancer, liver failure, and death. Therefore, the lifestyle changes are very important to decrease this risk.     Website with information about fatty liver and inflammation related to fatty liver (JON) = www.[employer]

## 2019-10-02 NOTE — PROGRESS NOTES
I have personally performed a face to face diagnostic evaluation on this patient. I have reviewed and agree with today's findings and the care plan outlined by Diane Cantor NP      My findings are as follows:  Patient presents with mildly abno LFTs  - some hx of alcohol excess  - obese  - Body mass index is 39.6 kg/m².    Likely NAFLD    - labs  - fibroscan      he will return to Diane Cantor NP  for follow-up.

## 2019-10-02 NOTE — LETTER
October 2, 2019      Monet Gregorio MD  [address]  [place] [address]           [facility]  [address]  [facility] [address]  Phone: [phone]  Fax: [phone]          Patient: Elmer Borrero   MR Number: [id number]   YOB: 1955   Date of Visit: 10/2/2019       Dear Dr. Monet Gregorio:    Thank you for referring Elmer Borrero to me for evaluation. Attached you will find relevant portions of my assessment and plan of care.    If you have questions, please do not hesitate to call me. I look forward to following Elmer Borrero along with you.    Sincerely,    Diane Cantor, BOB    Enclosure  CC:  No Recipients    If you would like to receive this communication electronically, please contact [email or web address] or [phone] to request more information on [employer] access.    For providers and/or their staff who would like to refer a patient to [facility], please contact us through our one-stop-shop provider referral line, [facility], at [phone].    If you feel you have received this communication in error or would no longer like to receive these types of communications, please e-mail [email or web address]

## 2019-10-02 NOTE — PROGRESS NOTES
"[facility] VISIT NEW PT NOTE    REFERRING PROVIDER:  Dr. Monet Salas*    CHIEF COMPLAINT: alcohol abuse, fatty liver, elevated liver enzymes    HPI: This is a 64 y.o. White male with PMH noted below, presenting for evaluation of    fatty liver disease with elevated liver enzymes.    Previous heavy alcohol use (2-3 beers and 2-3 glasses of wine daily x 40 years). Stopped alcohol end of August 2019 before scheduled knee surgery     Interval HPI: Presents today with significant other. Doing well without alcohol since August. Does report vague history of hepatitis when younger, maybe Hep A (?).     Labs done 8/29/19 show elevated transaminase levels (ALT > AST, intermittently elevated since 2016 )  Platelets and alk phos WNL  Synthetic liver functioning WNL    Lab Results   Component Value Date    ALT 63 (H) 08/29/2019    AST 41 (H) 08/29/2019    ALKPHOS 71 08/29/2019    BILITOT 0.5 08/29/2019    ALBUMIN 4.4 08/29/2019    INR 0.9 08/29/2019     07/09/2019     Limited previous serologic w/u negative for viral hepatitis B    Abd U/S done 9/3/19 showed The liver is markedly hyperechoic.  No focal lesions were detected. No splenomegaly    Risk factors for fatty liver include alcohol use, morbid obesity, HTN, HLD. Last A1c 7/2019 was WNL  No structured exercise, fauzia given knee surgery recently. Diet "no structured diet"  No weight loss    Denies family history of liver disease. + significant Alcohol consumption, see below  Social History     Substance and Sexual Activity   Alcohol Use Yes    Comment: Previous heavy alcohol use (2-3 beers and 2-3 glasses of wine daily x 40 years). Stopped alcohol end of August 2019     Immunity to Hep A and B - will check today    Occupation: retired offshore    Denies jaundice, dark urine, abdominal distention, hematemesis, melena, slowed mentation. No abnormal skin rashes. No generalized joint or muscle pain.      Review of patient's allergies indicates: "   Allergen Reactions    Ace inhibitors Other (See Comments)     cough       Current Outpatient Medications on File Prior to Visit   Medication Sig Dispense Refill    allopurinol (ZYLOPRIM) 300 MG tablet Take 1 tablet (300 mg total) by mouth once daily. 90 tablet 3    aspirin (ECOTRIN) 81 MG EC tablet Take 1 tablet (81 mg total) by mouth 2 (two) times daily. 60 tablet 0    butenafine HCl (LOTRIMIN ULTRA TOP) Apply topically. Rash      clotrimazole (LOTRIMIN) 1 % cream Apply topically 2 (two) times daily. 12 g 0    cyclobenzaprine (FLEXERIL) 10 MG tablet TAKE 1 TABLET BY MOUTH 3 TIMES A DAY AS NEEDED FOR MUSCLE SPASMS 45 tablet 1    docusate sodium (COLACE) 100 MG capsule Take 1 capsule (100 mg total) by mouth 2 (two) times daily as needed for Constipation. 60 capsule 0    finasteride (PROSCAR) 5 mg tablet TAKE 1 TABLET BY MOUTH EVERY DAY 90 tablet 1    ketoconazole (NIZORAL) 2 % cream Apply topically 2 (two) times daily. 15 g 0    krill-om-3-dha-epa-phospho-ast (KRILL OIL) 1,681-673-45-80 mg Cap Take 2 tablets by mouth.      loratadine (CLARITIN) 10 mg tablet Take 1 tablet by mouth daily as needed.      montelukast (SINGULAIR) 10 mg tablet TAKE 1 TABLET EVERY EVENING 90 tablet 1    ondansetron (ZOFRAN-ODT) 8 MG TbDL Dissolve 1 tablet (8 mg total) by mouth every 12 (twelve) hours as needed (nausea). 20 tablet 0    oxyCODONE-acetaminophen (PERCOCET)  mg per tablet Take 1 tablet by mouth every 4 (four) hours as needed. 42 tablet 0    tamsulosin (FLOMAX) 0.4 mg Cap TAKE 1 CAPSULE DAILY 90 capsule 1    telmisartan-hydrochlorothiazide (MICARDIS HCT) 80-25 mg per tablet Take 1 tablet by mouth once daily. 90 tablet 3     Current Facility-Administered Medications on File Prior to Visit   Medication Dose Route Frequency Provider Last Rate Last Dose    EUFLEXXA injection Syrg 20 mg  20 mg Intra-articular Weekly Renu Mcgee NP   20 mg at 10/06/16 1049    triamcinolone acetonide injection 40 mg  40  "mg Intramuscular 1 time in Clinic/HOD Daphne Kerns NP           PMHX:  has a past medical history of Actinic keratosis, Arthritis, BPH (benign prostatic hyperplasia), Cataract, Hyperlipidemia, Hypertension, Kidney stone, Morbid obesity with BMI of 40.0-44.9, adult (10/2/2019), GEOVANNA on CPAP, Renal calculi, and Subclinical hypothyroidism.    PSHX:  has a past surgical history that includes Knee surgery; Joint replacement; Percutaneous cryotherapy of peripheral nerve using liquid nitrous oxide in closed needle device (Right, 8/26/2019); Spine surgery; and Total knee arthroplasty (Right, 9/5/2019).    FAMILY HISTORY: Negative for liver disease, reviewed in [facility]    SOCIAL HISTORY:   Social History     Tobacco Use   Smoking Status Never Smoker   Smokeless Tobacco Never Used       Social History     Substance and Sexual Activity   Alcohol Use Yes    Comment: Previous heavy alcohol use (2-3 beers and 2-3 glasses of wine daily x 40 years). Stopped alcohol end of August 2019       Social History     Substance and Sexual Activity   Drug Use No       ROS:   GENERAL: Denies fever, chills, weight loss/gain, fatigue  HEENT: Denies headaches, dizziness, vision/hearing changes  CARDIOVASCULAR: Denies chest pain, palpitations, or edema  RESPIRATORY: Denies dyspnea, cough  GI: Denies abdominal pain, rectal bleeding, nausea, vomiting. No change in bowel pattern or color  : Denies dysuria, hematuria   SKIN: Denies rash, itching   NEURO: Denies confusion, memory loss, or mood changes  PSYCH: Denies depression or anxiety  HEME/LYMPH: Denies easy bruising or bleeding    PHYSICAL EXAM:   Friendly White male, in no acute distress; alert and oriented to person, place and time  VITALS: BP (!) 158/77 (BP Location: Left arm, Patient Position: Sitting, BP Method: Medium (Automatic))   Pulse 86   Temp 98.8 °F (37.1 °C) (Oral)   Resp 18   Ht 5' 7.5" (1.715 m)   Wt 116.4 kg (256 lb 9.9 oz)   SpO2 98%   BMI 39.60 kg/m²   HENT: " Normocephalic, without obvious abnormality. Oral mucosa pink and moist. Dentition good.  EYES: Sclerae anicteric. No conjunctival pallor.   NECK: Supple. No masses or cervical adenopathy.  CARDIOVASCULAR: Regular rate and rhythm. No murmurs.  RESPIRATORY: Normal respiratory effort. BBS CTA. No wheezes or crackles.  GI: Soft, non-tender, non-distended. + hepatomegaly. No masses palpable. No ascites.  EXTREMITIES:  No clubbing, cyanosis or edema.  SKIN: Warm and dry. No jaundice. No rashes noted to exposed skin. + telangectasias noted. No palmar erythema.  NEURO:  Normal gait. No asterixis.  PSYCH:  Memory intact. Thought and speech pattern appropriate. Behavior normal. No depression or anxiety noted.    DIAGNOSTIC STUDIES:    ABD. U/S-    Done 9/2019  FINDINGS:  Visualized pancreas, aorta, inferior vena cava, portal and hepatic veins are unremarkable.    The gallbladder, kidneys and spleen are unremarkable.  No ascites.  No intra or extrahepatic biliary dilatation.    The liver is markedly hyperechoic.  No focal lesions were detected.    The CBD measures 4 mm.      Impression       Markedly hyperechoic liver, suggesting severe hepatic steatosis    Otherwise, unremarkable exam       LIVER BIOPSY-   None     FIBROSCAN - to be done tomorrow       EDUCATION:   We discussed the manifestations of non-alcoholic fatty liver disease. At this time, there are no FDA approved therapy for non-alcoholic fatty liver disease.  The patient and I discussed the importance of diet, exercise, and weight loss for management of non-alcoholic fatty liver disease.    We discussed a low carb/sugar, high fiber and protein diet and a goal of 30 minutes of exercise 5 times per week    Recommend to avoid alcohol consumption. It is know that heavy alcohol use is associated with disease progression among patients with fatty liver disease. Information is unknown at this time of the effects on the liver with alcohol use in moderation.    Patient is  aware that fatty liver can progress to steatohepatitis and possibly to cirrhosis, putting one at increased risk for liver cancer, liver failure, and death      ASSESSMENT & PLAN:  64 y.o. White male with:  1.  Fatty liver, likely alcohol + metabolic risk factors  -- Limited previous serologic w/u negative for viral hepatitis B  -- Abd U/S done 9/3/19 showed The liver is markedly hyperechoic.  No focal lesions were detected. No splenomegaly  - ALT > AST, intermittently elevated since 2016  - Synthetic liver function WNL  - risk factors for fatty liver disease include alcohol use, morbid obesity, HTN, HLD  -- Immunity to Hep A and B : will check today, Will check immunity markers for HBV/HAV  and arrange for vaccination if needed  -- labs today  Orders Placed This Encounter   Procedures    US Elastography Liver    Hepatitis A antibody, IgG    Hepatitis B core antibody, total    Hepatitis B surface antibody    Alpha-1-antitrypsin    YAYA Screen w/Reflex    Antimitochondrial antibody    Anti-smooth muscle antibody    IgG    IgM    Hepatic function panel    Ceruloplasmin    Ferritin    Iron and TIBC    Phosphatidylethanol (PETH)    Protime-INR     -- fibroscan tomorrow    2. Elevated liver enzymes  -- will complete sero w/u today     3. Obesity, HTN, HLD  -- Body mass index is 39.6 kg/m².   -- increases risk for fatty liver  -- recommendations to pt:  1. Weight loss goal of 25-35 lbs  2. Low carb/sugar, high fiber and protein diet  3. Exercise, 5 days per week, 30 minutes per day, as tolerated  4. Recommend good cholesterol, blood pressure, blood sugar levels     4. Alcohol use   Social History     Substance and Sexual Activity   Alcohol Use Yes    Comment: Previous heavy alcohol use (2-3 beers and 2-3 glasses of wine daily x 40 years). Stopped alcohol end of August 2019     Follow up for pending results of workup.     Thank you for allowing me to participate in the care of Elmer Contreras  ALLY Manuel    ADDENDUM 10/7/19: Serological workup was negative for Daniel's, alpha-1 antitrypsin deficiency, hemochromatosis, autoimmune etiology, and viral hepatitis C.   Needs for Hep B vaccine, + Hep A immunity  Fibroscan with F0-1 (kPA 7.1) but S3 steatosis. Continue to avoid alcohol, f/u in Feb or March with labs a few days before, then likely can transition to yearly f/u after then       CC'ed note to:   Monet Gregorio*  Dae Romo MD

## 2019-10-03 ENCOUNTER — PROCEDURE VISIT (OUTPATIENT)
Dept: HEPATOLOGY | Facility: CLINIC | Age: 64
End: 2019-10-03
Attending: NURSE PRACTITIONER
Payer: MEDICARE

## 2019-10-03 ENCOUNTER — IMMUNIZATION (OUTPATIENT)
Dept: PHARMACY | Facility: CLINIC | Age: 64
End: 2019-10-03
Payer: MEDICARE

## 2019-10-03 DIAGNOSIS — R74.8 ELEVATED LIVER ENZYMES: ICD-10-CM

## 2019-10-03 DIAGNOSIS — K76.0 FATTY LIVER: ICD-10-CM

## 2019-10-03 LAB
A1AT SERPL-MCNC: 187 MG/DL (ref 100–190)
ANA SER QL IF: NORMAL
CERULOPLASMIN SERPL-MCNC: 36 MG/DL (ref 15–45)
IGG SERPL-MCNC: 1531 MG/DL (ref 650–1600)
IGM SERPL-MCNC: 89 MG/DL (ref 50–300)
MITOCHONDRIA AB TITR SER IF: NORMAL {TITER}
SMOOTH MUSCLE AB TITR SER IF: NORMAL {TITER}

## 2019-10-03 PROCEDURE — 91200 PR LIVER ELASTOGRAPHY W/OUT IMAG W/INTERP & REPORT: ICD-10-PCS | Mod: 26,S$PBB,, | Performed by: NURSE PRACTITIONER

## 2019-10-03 PROCEDURE — 91200 LIVER ELASTOGRAPHY: CPT | Mod: 26,S$PBB,, | Performed by: NURSE PRACTITIONER

## 2019-10-03 PROCEDURE — 91200 LIVER ELASTOGRAPHY: CPT | Mod: PBBFAC | Performed by: NURSE PRACTITIONER

## 2019-10-05 ENCOUNTER — PATIENT MESSAGE (OUTPATIENT)
Dept: ORTHOPEDICS | Facility: CLINIC | Age: 64
End: 2019-10-05

## 2019-10-07 ENCOUNTER — TELEPHONE (OUTPATIENT)
Dept: HEPATOLOGY | Facility: CLINIC | Age: 64
End: 2019-10-07

## 2019-10-07 ENCOUNTER — PATIENT MESSAGE (OUTPATIENT)
Dept: HEPATOLOGY | Facility: CLINIC | Age: 64
End: 2019-10-07

## 2019-10-07 DIAGNOSIS — Z23 NEED FOR HEPATITIS B VACCINATION: ICD-10-CM

## 2019-10-07 DIAGNOSIS — R74.8 ELEVATED LIVER ENZYMES: Primary | ICD-10-CM

## 2019-10-07 NOTE — PROCEDURES
Procedures   Fibroscan Procedure     Name: Elmer Borrero  Date of Procedure : 10/07/2019   :: Diane Cantor NP  Diagnosis: elevated liver enzymes, fatty liver    Probe: XL    Fibroscan readin.1 KPa    Fibrosis:F 0-1     CAP readin dB/m    Steatosis: :S3

## 2019-10-07 NOTE — TELEPHONE ENCOUNTER
Attempted to contact patient to schedule his labs and follow up appointment in February but got no response. I left patient a voice mail asking him to contact us back when he's able. I also sent patient a message on [email or web address] informing him of the need to schedule his follow up.

## 2019-10-07 NOTE — TELEPHONE ENCOUNTER
Please contact pt, notify him results of labs and [facility]roscan [facility] to [facility] Recommend f/u in Feb or early March with labs before (hepatic function panel, Hep B labs x2). Please schedule labs a couple days before f/u visit with me (labs near him in [place] if possible) Thanks

## 2019-10-15 LAB — PHOSPHATIDYLETHANOL (PETH): NEGATIVE NG/ML

## 2019-10-17 ENCOUNTER — OFFICE VISIT (OUTPATIENT)
Dept: ORTHOPEDICS | Facility: CLINIC | Age: 64
End: 2019-10-17
Payer: MEDICARE

## 2019-10-17 ENCOUNTER — HOSPITAL ENCOUNTER (OUTPATIENT)
Dept: RADIOLOGY | Facility: HOSPITAL | Age: 64
Discharge: HOME | End: 2019-10-17
Attending: NURSE PRACTITIONER
Payer: MEDICARE

## 2019-10-17 VITALS — BODY MASS INDEX: 40.14 KG/M2 | HEIGHT: 67 IN | WEIGHT: 255.75 LBS

## 2019-10-17 DIAGNOSIS — M25.50 ARTHRALGIA, UNSPECIFIED JOINT: ICD-10-CM

## 2019-10-17 DIAGNOSIS — Z96.651 STATUS POST TOTAL RIGHT KNEE REPLACEMENT USING CEMENT: Primary | ICD-10-CM

## 2019-10-17 DIAGNOSIS — Z96.651 STATUS POST TOTAL RIGHT KNEE REPLACEMENT USING CEMENT: ICD-10-CM

## 2019-10-17 PROCEDURE — 73560 XR KNEE ORTHO RIGHT: ICD-10-PCS | Mod: 26,59,LT, | Performed by: RADIOLOGY

## 2019-10-17 PROCEDURE — 99999 PR PBB SHADOW E&M-EST. PATIENT-LVL III: ICD-10-PCS | Mod: PBBFAC,,, | Performed by: NURSE PRACTITIONER

## 2019-10-17 PROCEDURE — 73560 X-RAY EXAM OF KNEE 1 OR 2: CPT | Mod: 26,59,LT, | Performed by: RADIOLOGY

## 2019-10-17 PROCEDURE — 99024 PR POST-OP FOLLOW-UP VISIT: ICD-10-PCS | Mod: POP,,, | Performed by: NURSE PRACTITIONER

## 2019-10-17 PROCEDURE — 73562 X-RAY EXAM OF KNEE 3: CPT | Mod: TC,RT

## 2019-10-17 PROCEDURE — 99213 OFFICE O/P EST LOW 20 MIN: CPT | Mod: PBBFAC,25 | Performed by: NURSE PRACTITIONER

## 2019-10-17 PROCEDURE — 73562 XR KNEE ORTHO RIGHT: ICD-10-PCS | Mod: 26,RT,, | Performed by: RADIOLOGY

## 2019-10-17 PROCEDURE — 99999 PR PBB SHADOW E&M-EST. PATIENT-LVL III: CPT | Mod: PBBFAC,,, | Performed by: NURSE PRACTITIONER

## 2019-10-17 PROCEDURE — 73560 X-RAY EXAM OF KNEE 1 OR 2: CPT | Mod: TC,LT

## 2019-10-17 PROCEDURE — 73562 X-RAY EXAM OF KNEE 3: CPT | Mod: 26,RT,, | Performed by: RADIOLOGY

## 2019-10-17 PROCEDURE — 99024 POSTOP FOLLOW-UP VISIT: CPT | Mod: POP,,, | Performed by: NURSE PRACTITIONER

## 2019-10-17 RX ORDER — TAMSULOSIN HYDROCHLORIDE 0.4 MG/1
CAPSULE ORAL
COMMUNITY
End: 2019-11-07

## 2019-10-17 RX ORDER — ACETAMINOPHEN AND CODEINE PHOSPHATE 300; 30 MG/1; MG/1
TABLET ORAL
COMMUNITY
End: 2019-11-07

## 2019-10-17 RX ORDER — CYCLOBENZAPRINE HCL 10 MG
TABLET ORAL
COMMUNITY
End: 2019-11-07 | Stop reason: SDUPTHER

## 2019-10-17 RX ORDER — MUPIROCIN 20 MG/G
OINTMENT TOPICAL
COMMUNITY
End: 2019-11-07

## 2019-10-17 RX ORDER — MELOXICAM 15 MG/1
TABLET ORAL
Qty: 90 TABLET | Refills: 0 | Status: ON HOLD | OUTPATIENT
Start: 2019-10-17 | End: 2019-11-28 | Stop reason: HOSPADM

## 2019-10-17 RX ORDER — DICLOFENAC SODIUM 75 MG/1
75 TABLET, DELAYED RELEASE ORAL DAILY
Status: ON HOLD | COMMUNITY
End: 2019-11-28 | Stop reason: HOSPADM

## 2019-10-17 RX ORDER — TELMISARTAN AND HYDROCHLORTHIAZIDE 80; 25 MG/1; MG/1
TABLET ORAL
COMMUNITY
End: 2019-11-07

## 2019-10-17 RX ORDER — MONTELUKAST SODIUM 10 MG/1
TABLET ORAL
COMMUNITY
End: 2019-11-07 | Stop reason: SDUPTHER

## 2019-10-17 RX ORDER — FINASTERIDE 5 MG/1
TABLET, FILM COATED ORAL
COMMUNITY
End: 2019-10-23

## 2019-10-17 RX ORDER — ALLOPURINOL 300 MG/1
TABLET ORAL
COMMUNITY
End: 2019-11-07 | Stop reason: SDUPTHER

## 2019-10-17 NOTE — PROGRESS NOTES
"Elmer Borrero presents for 6 week post-operative visit following a right total knee arthroplasty performed by Dr. Schultz on 9/5/2019. Tolerating pain medication well.      Exam:   Height 5' 7" (1.702 m), weight 116 kg (255 lb 11.7 oz).   Ambulating well without assistive device.  Incision is completely healed. ROM:0-120    Post-operative radiographs reviewed today revealing a well fixed and aligned prosthesis.    A/P:  6 weeks s/p right total knee replacement.   - Outpatient PT: ongoing  - Follow up in 6 weeks with Dr. Schultz. Pt will call clinic with problems/concerns.     "

## 2019-10-18 ENCOUNTER — IMMUNIZATION (OUTPATIENT)
Dept: PHARMACY | Facility: CLINIC | Age: 64
End: 2019-10-18
Payer: MEDICARE

## 2019-11-18 ENCOUNTER — PATIENT MESSAGE (OUTPATIENT)
Dept: PHARMACY | Facility: CLINIC | Age: 64
End: 2019-11-18

## 2019-11-19 ENCOUNTER — PATIENT MESSAGE (OUTPATIENT)
Dept: ORTHOPEDICS | Facility: CLINIC | Age: 64
End: 2019-11-19

## 2019-11-26 PROBLEM — R07.9 CHEST PAIN: Status: ACTIVE | Noted: 2019-11-26

## 2019-11-27 ENCOUNTER — IMMUNIZATION (OUTPATIENT)
Dept: PHARMACY | Facility: CLINIC | Age: 64
End: 2019-11-27
Payer: MEDICARE

## 2019-12-01 ENCOUNTER — PATIENT MESSAGE (OUTPATIENT)
Dept: ADMINISTRATIVE | Facility: OTHER | Age: 64
End: 2019-12-01

## 2019-12-02 ENCOUNTER — OFFICE VISIT (OUTPATIENT)
Dept: ORTHOPEDICS | Facility: CLINIC | Age: 64
End: 2019-12-02
Payer: MEDICARE

## 2019-12-02 VITALS — HEIGHT: 67 IN | WEIGHT: 255.75 LBS | BODY MASS INDEX: 40.14 KG/M2

## 2019-12-02 DIAGNOSIS — Z96.651 STATUS POST TOTAL RIGHT KNEE REPLACEMENT USING CEMENT: Primary | ICD-10-CM

## 2019-12-02 PROCEDURE — 99024 POSTOP FOLLOW-UP VISIT: CPT | Mod: POP,,, | Performed by: ORTHOPAEDIC SURGERY

## 2019-12-02 PROCEDURE — 99999 PR PBB SHADOW E&M-EST. PATIENT-LVL III: ICD-10-PCS | Mod: PBBFAC,,, | Performed by: ORTHOPAEDIC SURGERY

## 2019-12-02 PROCEDURE — 99213 OFFICE O/P EST LOW 20 MIN: CPT | Mod: PBBFAC | Performed by: ORTHOPAEDIC SURGERY

## 2019-12-02 PROCEDURE — 99999 PR PBB SHADOW E&M-EST. PATIENT-LVL III: CPT | Mod: PBBFAC,,, | Performed by: ORTHOPAEDIC SURGERY

## 2019-12-02 PROCEDURE — 99024 PR POST-OP FOLLOW-UP VISIT: ICD-10-PCS | Mod: POP,,, | Performed by: ORTHOPAEDIC SURGERY

## 2019-12-02 NOTE — PROGRESS NOTES
Elmer Garciaxnayder is in for 3 month follow up for a  Right TKA.  He is doing very well.  Minimal pain in the knee.  He has resumed activities of daily living. He had chest pain and a stent placed last week for 99% blockage.  Exam demonstrates  A well developed male in no distress.  Alert and oriented.  Mood and affect are appropriate.    Knee incision is well healed.  ROM is 0-125.  The patella tracks well and there is no instability. The extremity is neurovascularly intact.    Xrays demonstrate a well fixed and positioned prosthesis.      Imp:Doing well    F/u in 9 months with xrays and PROMS.  Sooner if needed

## 2019-12-06 ENCOUNTER — PATIENT MESSAGE (OUTPATIENT)
Dept: ORTHOPEDICS | Facility: CLINIC | Age: 64
End: 2019-12-06

## 2019-12-06 DIAGNOSIS — M62.838 MUSCLE SPASMS OF BOTH LOWER EXTREMITIES: Primary | ICD-10-CM

## 2019-12-06 RX ORDER — CYCLOBENZAPRINE HCL 5 MG
5 TABLET ORAL 3 TIMES DAILY PRN
Qty: 40 TABLET | Refills: 2 | Status: SHIPPED | OUTPATIENT
Start: 2019-12-06 | End: 2019-12-16

## 2020-01-16 ENCOUNTER — OFFICE VISIT (OUTPATIENT)
Dept: URGENT CARE | Facility: CLINIC | Age: 65
End: 2020-01-16
Payer: MEDICARE

## 2020-01-16 VITALS
BODY MASS INDEX: 40.34 KG/M2 | OXYGEN SATURATION: 100 % | DIASTOLIC BLOOD PRESSURE: 68 MMHG | HEART RATE: 97 BPM | SYSTOLIC BLOOD PRESSURE: 144 MMHG | HEIGHT: 67 IN | WEIGHT: 257 LBS | TEMPERATURE: 98 F

## 2020-01-16 DIAGNOSIS — J01.00 ACUTE MAXILLARY SINUSITIS, RECURRENCE NOT SPECIFIED: Primary | ICD-10-CM

## 2020-01-16 DIAGNOSIS — R05.9 COUGH: ICD-10-CM

## 2020-01-16 PROCEDURE — 99214 PR OFFICE/OUTPT VISIT, EST, LEVL IV, 30-39 MIN: ICD-10-PCS | Mod: S$GLB,,, | Performed by: PHYSICIAN ASSISTANT

## 2020-01-16 PROCEDURE — 99214 OFFICE O/P EST MOD 30 MIN: CPT | Mod: S$GLB,,, | Performed by: PHYSICIAN ASSISTANT

## 2020-01-16 RX ORDER — PREDNISONE 20 MG/1
20 TABLET ORAL DAILY
Qty: 4 TABLET | Refills: 0 | Status: SHIPPED | OUTPATIENT
Start: 2020-01-16 | End: 2020-01-20

## 2020-01-16 RX ORDER — CODEINE PHOSPHATE AND GUAIFENESIN 10; 100 MG/5ML; MG/5ML
5 SOLUTION ORAL EVERY 8 HOURS PRN
Qty: 118 ML | Refills: 0 | Status: SHIPPED | OUTPATIENT
Start: 2020-01-16 | End: 2020-01-19

## 2020-01-16 RX ORDER — AMOXICILLIN AND CLAVULANATE POTASSIUM 875; 125 MG/1; MG/1
1 TABLET, FILM COATED ORAL EVERY 12 HOURS
Qty: 20 TABLET | Refills: 0 | Status: SHIPPED | OUTPATIENT
Start: 2020-01-16 | End: 2020-01-26

## 2020-01-16 NOTE — PATIENT INSTRUCTIONS
1.  Take all antibiotics as prescribed.  It is imperative that once you start them, you take them to completion.     2.  For patients above 6 months of age who are not allergic to and are not on anticoagulants, you can alternate Tylenol and Motrin every 4-6 hours for fever above 100.4F and/or pain.  For patients less than 6 months of age, allergic to or intolerant to NSAIDS, have gastritis, gastric ulcers, or history of GI bleeds, are pregnant, or are on anticoagulant therapy, you can take Tylenol every 4 hours as needed for fever above 100.4F and/or pain.     3.  Rest and keep yourself well hydrated.  Drink hot liquids (coffee, water, tea, hot chocolate, or soup) 10-12 times a day for 5-7 days.  Put liquid in a mug and place in microwave for 2.5 - 3 minutes. Pour hot liquid into another mug not used to microwave the liquid (to avoid burning your mouth) then sniff the steam from the cup and sip the heated liquid.    4.  You can use these over the counter medications/remedies to help with your symptoms:     Runny Nose:  Use an antihistamine such as Claritin, Zyrtec or Allegra to help dry you out.     Congestion:  Use pseudoephedrine (behind the counter) for congestion- Pseudoephedrine 30 mg up to 240 mg /day. Warning:  It can raise your blood pressure and give you palpitations.  Coricidin HBP is okay to use if you have high blood pressure.     Use mucinex (guaifenisin) up to 2400mg/day to break up/loosen any mucous. MucinexDM has a cough suppressant that can be used for cough and at night to stop the tickle in the back of your throat.    Use Nasal Saline to mechanically move any post nasal drip from your eustachian tubes or from the back of your throat.    Use Afrin in each nare, for no longer than 3 days, as it is addictive. It can also dry out your mucous membranes and cause elevated blood pressure.    Use Flonase 1-2 sprays/nostril per day. It is a local acting steroid nasal spray.  If you develop a bloody nose,  stop using the medication immediately.    Sore throat:  Use warm, salt water gargles to ease your throat pain- 1/2 tsp salt to 1 cup warm water, gargle as desired.  Chloraseptic sprays and throat lozenges will also help to ease throat pain.     Sometimes Nyquil at night is beneficial to help you get some rest; however, it is sedating and does contain an antihistamine and Tylenol.  Make sure not to double up on these medications.      These things will help you to feel better and will speed your recovery.  If your condition fails to improve in a timely manner, you should receive another evaluation by your Primary Care Provider/Pediatrician to discuss your concerns or return to urgent care for a recheck.  If your condition worsens at any time, you should report immediately to your nearest Emergency Department for further evaluation. **You must understand that you have received Urgent Care treatment only and that you may be released before all of your medical problems are known or treated. You, the patient, are responsible to arrange for follow-up care as instructed.         Sinusitis (Antibiotic Treatment)    The sinuses are air-filled spaces within the bones of the face. They connect to the inside of the nose. Sinusitis is an inflammation of the tissue lining the sinus cavity. Sinus inflammation can occur during a cold. It can also be due to allergies to pollens and other particles in the air. Sinusitis can cause symptoms of sinus congestion and fullness. A sinus infection causes fever, headache and facial pain. There is often green or yellow drainage from the nose or into the back of the throat (post-nasal drip). You have been given antibiotics to treat this condition.  Home care:  · Take the full course of antibiotics as instructed. Do not stop taking them, even if you feel better.  · Drink plenty of water, hot tea, and other liquids. This may help thin mucus. It also may promote sinus drainage.  · Heat may help  soothe painful areas of the face. Use a towel soaked in hot water. Or,  the shower and direct the hot spray onto your face. Using a vaporizer along with a menthol rub at night may also help.   · An expectorant containing guaifenesin may help thin the mucus and promote drainage from the sinuses.  · Over-the-counter decongestants may be used unless a similar medicine was prescribed. Nasal sprays work the fastest. Use one that contains phenylephrine or oxymetazoline. First blow the nose gently. Then use the spray. Do not use these medicines more often than directed on the label or symptoms may get worse. You may also use tablets containing pseudoephedrine. Avoid products that combine ingredients, because side effects may be increased. Read labels. You can also ask the pharmacist for help. (NOTE: Persons with high blood pressure should not use decongestants. They can raise blood pressure.)  · Over-the-counter antihistamines may help if allergies contributed to your sinusitis.    · Do not use nasal rinses or irrigation during an acute sinus infection, unless told to by your health care provider. Rinsing may spread the infection to other sinuses.  · Use acetaminophen or ibuprofen to control pain, unless another pain medicine was prescribed. (If you have chronic liver or kidney disease or ever had a stomach ulcer, talk with your doctor before using these medicines. Aspirin should never be used in anyone under 18 years of age who is ill with a fever. It may cause severe liver damage.)  · Don't smoke. This can worsen symptoms.  Follow-up care  Follow up with your healthcare provider or our staff if you are not improving within the next week.  When to seek medical advice  Call your healthcare provider if any of these occur:  · Facial pain or headache becoming more severe  · Stiff neck  · Unusual drowsiness or confusion  · Swelling of the forehead or eyelids  · Vision problems, including blurred or double  vision  · Fever of 100.4ºF (38ºC) or higher, or as directed by your healthcare provider  · Seizure  · Breathing problems  · Symptoms not resolving within 10 days  Date Last Reviewed: 4/13/2015  © 5026-5748 [employer]. [address] [place] [address], [place] [address]. All rights reserved. This information is not intended as a substitute for professional medical care. Always follow your healthcare professional's instructions.

## 2020-01-16 NOTE — PROGRESS NOTES
"Subjective:       Patient ID: Elmer Borrero is a 64 y.o. male.    Vitals:  height is 5' 7" (1.702 m) and weight is 116.6 kg (257 lb). His oral temperature is 98 °F (36.7 °C). His blood pressure is 144/68 (abnormal) and his pulse is 97. His oxygen saturation is 100%.     Chief Complaint: Cough    Patient complains of a RN, congestion, PND, sore throat, cough, headache, and sinus pressure/pain for the past 14 days.  Patient denies fever and chills.  Patient also denies improvement with OTC meds.  Patient denies any other complaints at this time.       Cough   This is a new problem. The current episode started 1 to 4 weeks ago. The problem has been gradually worsening. The problem occurs constantly. The cough is productive of sputum. Associated symptoms include headaches, nasal congestion, postnasal drip, rhinorrhea, a sore throat and wheezing. Pertinent negatives include no chest pain, chills, ear congestion, ear pain, eye redness, fever, hemoptysis, myalgias, rash or shortness of breath. The symptoms are aggravated by lying down. He has tried OTC cough suppressant for the symptoms. The treatment provided no relief. There is no history of asthma, bronchitis, COPD, emphysema or pneumonia.       Constitution: Negative for chills, sweating, fatigue and fever.   HENT: Positive for congestion, postnasal drip, sinus pain, sinus pressure and sore throat. Negative for ear pain and voice change.    Neck: Negative for painful lymph nodes.   Cardiovascular: Negative for chest pain.   Eyes: Negative for eye redness.   Respiratory: Positive for cough, sputum production and wheezing. Negative for chest tightness, bloody sputum, COPD, shortness of breath, stridor and asthma.    Gastrointestinal: Negative for abdominal pain, nausea, vomiting and diarrhea.   Musculoskeletal: Negative for muscle ache.   Skin: Negative for rash.   Allergic/Immunologic: Negative for seasonal allergies and asthma.   Neurological: Positive for " headaches.   Hematologic/Lymphatic: Negative for swollen lymph nodes.       Objective:      Physical Exam   Constitutional: He is oriented to person, place, and time. He appears well-developed and well-nourished. He is cooperative.  Non-toxic appearance. He does not have a sickly appearance. He does not appear ill. No distress.   HENT:   Head: Normocephalic and atraumatic.   Right Ear: Hearing, tympanic membrane, external ear and ear canal normal.   Left Ear: Hearing, tympanic membrane, external ear and ear canal normal.   Nose: Mucosal edema (and nasal congestion) present. No rhinorrhea or nasal deformity. No epistaxis. Right sinus exhibits maxillary sinus tenderness. Right sinus exhibits no frontal sinus tenderness. Left sinus exhibits maxillary sinus tenderness. Left sinus exhibits no frontal sinus tenderness.   Mouth/Throat: Uvula is midline, oropharynx is clear and moist and mucous membranes are normal. No trismus in the jaw. Normal dentition. No uvula swelling. No oropharyngeal exudate, posterior oropharyngeal edema or posterior oropharyngeal erythema. No tonsillar exudate.   Eyes: Conjunctivae and lids are normal. No scleral icterus.   Neck: Trachea normal, full passive range of motion without pain and phonation normal. Neck supple. No neck rigidity. No edema and no erythema present.   Cardiovascular: Normal rate, regular rhythm, normal heart sounds, intact distal pulses and normal pulses.   Pulmonary/Chest: Effort normal and breath sounds normal. No respiratory distress. He has no decreased breath sounds. He has no rhonchi.   Lungs CTA but harsh, dry cough noted   Abdominal: Normal appearance.   Musculoskeletal: Normal range of motion. He exhibits no edema or deformity.   Neurological: He is alert and oriented to person, place, and time. He exhibits normal muscle tone. Coordination normal.   Skin: Skin is warm, dry, intact, not diaphoretic and not pale.   Psychiatric: He has a normal mood and affect. His  speech is normal and behavior is normal. Judgment and thought content normal. Cognition and memory are normal.   Nursing note and vitals reviewed.        Assessment:       1. Acute maxillary sinusitis, recurrence not specified    2. Cough        Plan:         Acute maxillary sinusitis, recurrence not specified  -     amoxicillin-clavulanate 875-125mg (AUGMENTIN) 875-125 mg per tablet; Take 1 tablet by mouth every 12 (twelve) hours. for 10 days  Dispense: 20 tablet; Refill: 0  -     predniSONE (DELTASONE) 20 MG tablet; Take 1 tablet (20 mg total) by mouth once daily. for 4 days  Dispense: 4 tablet; Refill: 0  -     guaifenesin-codeine 100-10 mg/5 ml (TUSSI-ORGANIDIN NR)  mg/5 mL syrup; Take 5 mLs by mouth every 8 (eight) hours as needed for Cough or Congestion.  Dispense: 118 mL; Refill: 0    Cough  -     amoxicillin-clavulanate 875-125mg (AUGMENTIN) 875-125 mg per tablet; Take 1 tablet by mouth every 12 (twelve) hours. for 10 days  Dispense: 20 tablet; Refill: 0  -     predniSONE (DELTASONE) 20 MG tablet; Take 1 tablet (20 mg total) by mouth once daily. for 4 days  Dispense: 4 tablet; Refill: 0  -     guaifenesin-codeine 100-10 mg/5 ml (TUSSI-ORGANIDIN NR)  mg/5 mL syrup; Take 5 mLs by mouth every 8 (eight) hours as needed for Cough or Congestion.  Dispense: 118 mL; Refill: 0      Patient Instructions   1.  Take all antibiotics as prescribed.  It is imperative that once you start them, you take them to completion.     2.  For patients above 6 months of age who are not allergic to and are not on anticoagulants, you can alternate Tylenol and Motrin every 4-6 hours for fever above 100.4F and/or pain.  For patients less than 6 months of age, allergic to or intolerant to NSAIDS, have gastritis, gastric ulcers, or history of GI bleeds, are pregnant, or are on anticoagulant therapy, you can take Tylenol every 4 hours as needed for fever above 100.4F and/or pain.     3.  Rest and keep yourself well hydrated.   Drink hot liquids (coffee, water, tea, hot chocolate, or soup) 10-12 times a day for 5-7 days.  Put liquid in a mug and place in microwave for 2.5 - 3 minutes. Pour hot liquid into another mug not used to microwave the liquid (to avoid burning your mouth) then sniff the steam from the cup and sip the heated liquid.    4.  You can use these over the counter medications/remedies to help with your symptoms:     Runny Nose:  Use an antihistamine such as Claritin, Zyrtec or Allegra to help dry you out.     Congestion:  Use pseudoephedrine (behind the counter) for congestion- Pseudoephedrine 30 mg up to 240 mg /day. Warning:  It can raise your blood pressure and give you palpitations.  Coricidin HBP is okay to use if you have high blood pressure.     Use mucinex (guaifenisin) up to 2400mg/day to break up/loosen any mucous. MucinexDM has a cough suppressant that can be used for cough and at night to stop the tickle in the back of your throat.    Use Nasal Saline to mechanically move any post nasal drip from your eustachian tubes or from the back of your throat.    Use Afrin in each nare, for no longer than 3 days, as it is addictive. It can also dry out your mucous membranes and cause elevated blood pressure.    Use Flonase 1-2 sprays/nostril per day. It is a local acting steroid nasal spray.  If you develop a bloody nose, stop using the medication immediately.    Sore throat:  Use warm, salt water gargles to ease your throat pain- 1/2 tsp salt to 1 cup warm water, gargle as desired.  Chloraseptic sprays and throat lozenges will also help to ease throat pain.     Sometimes Nyquil at night is beneficial to help you get some rest; however, it is sedating and does contain an antihistamine and Tylenol.  Make sure not to double up on these medications.      These things will help you to feel better and will speed your recovery.  If your condition fails to improve in a timely manner, you should receive another evaluation by your  Primary Care Provider/Pediatrician to discuss your concerns or return to urgent care for a recheck.  If your condition worsens at any time, you should report immediately to your nearest Emergency Department for further evaluation. **You must understand that you have received Urgent Care treatment only and that you may be released before all of your medical problems are known or treated. You, the patient, are responsible to arrange for follow-up care as instructed.         Sinusitis (Antibiotic Treatment)    The sinuses are air-filled spaces within the bones of the face. They connect to the inside of the nose. Sinusitis is an inflammation of the tissue lining the sinus cavity. Sinus inflammation can occur during a cold. It can also be due to allergies to pollens and other particles in the air. Sinusitis can cause symptoms of sinus congestion and fullness. A sinus infection causes fever, headache and facial pain. There is often green or yellow drainage from the nose or into the back of the throat (post-nasal drip). You have been given antibiotics to treat this condition.  Home care:  · Take the full course of antibiotics as instructed. Do not stop taking them, even if you feel better.  · Drink plenty of water, hot tea, and other liquids. This may help thin mucus. It also may promote sinus drainage.  · Heat may help soothe painful areas of the face. Use a towel soaked in hot water. Or,  the shower and direct the hot spray onto your face. Using a vaporizer along with a menthol rub at night may also help.   · An expectorant containing guaifenesin may help thin the mucus and promote drainage from the sinuses.  · Over-the-counter decongestants may be used unless a similar medicine was prescribed. Nasal sprays work the fastest. Use one that contains phenylephrine or oxymetazoline. First blow the nose gently. Then use the spray. Do not use these medicines more often than directed on the label or symptoms may get  worse. You may also use tablets containing pseudoephedrine. Avoid products that combine ingredients, because side effects may be increased. Read labels. You can also ask the pharmacist for help. (NOTE: Persons with high blood pressure should not use decongestants. They can raise blood pressure.)  · Over-the-counter antihistamines may help if allergies contributed to your sinusitis.    · Do not use nasal rinses or irrigation during an acute sinus infection, unless told to by your health care provider. Rinsing may spread the infection to other sinuses.  · Use acetaminophen or ibuprofen to control pain, unless another pain medicine was prescribed. (If you have chronic liver or kidney disease or ever had a stomach ulcer, talk with your doctor before using these medicines. Aspirin should never be used in anyone under 18 years of age who is ill with a fever. It may cause severe liver damage.)  · Don't smoke. This can worsen symptoms.  Follow-up care  Follow up with your healthcare provider or our staff if you are not improving within the next week.  When to seek medical advice  Call your healthcare provider if any of these occur:  · Facial pain or headache becoming more severe  · Stiff neck  · Unusual drowsiness or confusion  · Swelling of the forehead or eyelids  · Vision problems, including blurred or double vision  · Fever of 100.4ºF (38ºC) or higher, or as directed by your healthcare provider  · Seizure  · Breathing problems  · Symptoms not resolving within 10 days  Date Last Reviewed: 4/13/2015  © 7219-2069 [email or web address]. [address] [place] [address], [place] [address]. All rights reserved. This information is not intended as a substitute for professional medical care. Always follow your healthcare professional's instructions.

## 2020-01-19 ENCOUNTER — TELEPHONE (OUTPATIENT)
Dept: URGENT CARE | Facility: CLINIC | Age: 65
End: 2020-01-19

## 2020-01-19 NOTE — TELEPHONE ENCOUNTER
Pt states she is getting better since his visit on 1/16/2020. He states he still has a bit of a cough.

## 2020-01-29 DIAGNOSIS — M79.661 RIGHT CALF PAIN: Primary | ICD-10-CM

## 2020-01-29 NOTE — PROGRESS NOTES
Pt with right calf pain. Ultrasound ordered to be done in [place] on Monday. Will f/u results by phone.

## 2020-02-03 ENCOUNTER — TELEPHONE (OUTPATIENT)
Dept: ORTHOPEDICS | Facility: CLINIC | Age: 65
End: 2020-02-03

## 2020-02-03 NOTE — TELEPHONE ENCOUNTER
Called patient and let him know that the ultrasound was negative for dvt, but showed what appears to be a hematoma from a muscle strain in the calf.

## 2020-02-11 ENCOUNTER — OFFICE VISIT (OUTPATIENT)
Dept: HEPATOLOGY | Facility: CLINIC | Age: 65
End: 2020-02-11
Payer: MEDICARE

## 2020-02-11 VITALS
DIASTOLIC BLOOD PRESSURE: 67 MMHG | HEART RATE: 71 BPM | HEIGHT: 67 IN | WEIGHT: 253.75 LBS | OXYGEN SATURATION: 97 % | BODY MASS INDEX: 39.83 KG/M2 | SYSTOLIC BLOOD PRESSURE: 134 MMHG

## 2020-02-11 DIAGNOSIS — R74.8 ELEVATED LIVER ENZYMES: ICD-10-CM

## 2020-02-11 DIAGNOSIS — K76.0 FATTY LIVER: Primary | ICD-10-CM

## 2020-02-11 DIAGNOSIS — E66.9 OBESITY (BMI 30-39.9): ICD-10-CM

## 2020-02-11 DIAGNOSIS — I10 ESSENTIAL HYPERTENSION: ICD-10-CM

## 2020-02-11 DIAGNOSIS — E78.2 MIXED HYPERLIPIDEMIA: ICD-10-CM

## 2020-02-11 PROCEDURE — 99214 OFFICE O/P EST MOD 30 MIN: CPT | Mod: S$PBB,,, | Performed by: NURSE PRACTITIONER

## 2020-02-11 PROCEDURE — 99999 PR PBB SHADOW E&M-EST. PATIENT-LVL IV: ICD-10-PCS | Mod: PBBFAC,,, | Performed by: NURSE PRACTITIONER

## 2020-02-11 PROCEDURE — 99214 OFFICE O/P EST MOD 30 MIN: CPT | Mod: PBBFAC | Performed by: NURSE PRACTITIONER

## 2020-02-11 PROCEDURE — 99999 PR PBB SHADOW E&M-EST. PATIENT-LVL IV: CPT | Mod: PBBFAC,,, | Performed by: NURSE PRACTITIONER

## 2020-02-11 PROCEDURE — 99214 PR OFFICE/OUTPT VISIT, EST, LEVL IV, 30-39 MIN: ICD-10-PCS | Mod: S$PBB,,, | Performed by: NURSE PRACTITIONER

## 2020-02-11 NOTE — PATIENT INSTRUCTIONS
1. Fibroscan repeat in 1 year to look for fat or scar tissue in the liver  2.  Follow up in 1 year with labs and fibroscan same day as visit     There is no FDA approved therapy for non-alcoholic fatty liver disease. Therefore, these things are important:  1. Weight loss goal of 25-35 lbs, weight goal ~215-225 lbs  2. Low carb/sugar, high fiber and protein diet. Try to limit your carb intake to LESS than 30-45 grams of carbs with a meal or LESS than 5-10 grams with any snack (total of any snack foods eaten during that time). Use [employer] usha to add up your carbs through the day. Do NOT drink any beverages with calories or carbs (this can lead to high blood sugar and weight gain). Also, some of our patients have been very successful with weight loss using the pre made/planned meal planning services that limit calories and portion size (one example is [employer] Meals but there are many out there)  3. Exercise, 5 days per week, 30 minutes per day, as tolerated  4. Recommend good cholesterol, blood pressure, blood sugar levels     In some people, fatty liver can progress to steatohepatitis (inflamatory fatty liver) and possibly to cirrhosis, putting one at increased risk for liver cancer, liver failure, and death. Therefore, the lifestyle changes are very important to decrease this risk.     Website with information about fatty liver and inflammation related to fatty liver (JON) = [email or web address]

## 2020-02-11 NOTE — Clinical Note
Please enter recall for f/u with me in 1 year (2/2021) with hepatic function panel and fibroscan same dayThanksAimee

## 2020-02-11 NOTE — PROGRESS NOTES
[facility] Established Patient Visit    Reason for Visit:  Fatty liver, elevated liver enzymes    PCP: Dae Romo    HPI:  This is a 64 y.o. male with PMH noted below, here for follow up of fatty liver disease with elevated liver enzymes.     Previous heavy alcohol use (2-3 beers and 2-3 glasses of wine daily x 40 years). Stopped alcohol end of August 2019 before scheduled knee surgery, doing well without alcohol     Serological workup was negative for Daniel's, alpha-1 antitrypsin deficiency, hemochromatosis, autoimmune etiology, and viral hepatitis C.     Risk factors for fatty liver include previous alcohol use, morbid obesity, HTN, HLD    Liver fibrosis staging:  -- Fibroscan 10/2019 F0-1 (kPA 7.1), S3 steatosis       Interval HPI: Presents today with significant other. Doing well without alcohol.  No formal exercise or change in eating habits yet     Labs done 2/2020 show improving/mildly elevated transaminase levels (ALT > AST, intermittently elevated since 2016 )  Platelets and alk phos WNL  Synthetic liver functioning WNL    Lab Results   Component Value Date    ALT 40 02/03/2020    AST 35 02/03/2020    ALKPHOS 55 02/03/2020    BILITOT 0.7 02/03/2020    ALBUMIN 4.7 02/03/2020    INR 0.9 10/02/2019     11/28/2019     Abd U/S done 9/3/19 showed The liver is markedly hyperechoic.  No focal lesions were detected. No splenomegaly     Denies family history of liver disease. previously significant Alcohol consumption, none since August 2019, see below    Social History     Substance and Sexual Activity   Alcohol Use Not Currently    Comment: Previous heavy alcohol use (2-3 beers and 2-3 glasses of wine daily x 40 years). Stopped alcohol end of August 2019     Immunity to Hep A and B - completed Hep B vaccine series     Occupation: retired offshore     Denies jaundice, dark urine, abdominal distention, hematemesis, melena, slowed mentation. No abnormal skin rashes. No generalized joint or  muscle pain.       PMHX:  has a past medical history of Actinic keratosis, Arthritis, BPH (benign prostatic hyperplasia), Cataract, Coronary artery disease, Fatty liver, Hyperlipidemia, Hypertension, Kidney stone, Morbid obesity with BMI of 40.0-44.9, adult (10/2/2019), GEOVANNA on CPAP, Renal calculi, and Subclinical hypothyroidism.    PSHX:  has a past surgical history that includes Knee surgery; Joint replacement; Percutaneous cryotherapy of peripheral nerve using liquid nitrous oxide in closed needle device (Right, 8/26/2019); Spine surgery; Total knee arthroplasty (Right, 9/5/2019); right knee replacement ; and Left heart catheterization (Left, 11/27/2019).    The patient's social and family histories were reviewed by me and updated in the appropriate section of the electronic medical record.    Review of patient's allergies indicates:   Allergen Reactions    Ace inhibitors Other (See Comments)     cough       Current Outpatient Medications on File Prior to Visit   Medication Sig Dispense Refill    allopurinol (ZYLOPRIM) 300 MG tablet Take 1 tablet (300 mg total) by mouth once daily. 90 tablet 3    aspirin (ECOTRIN) 81 MG EC tablet Take 1 tablet (81 mg total) by mouth once daily. 30 tablet 0    finasteride (PROSCAR) 5 mg tablet TAKE 1 TABLET BY MOUTH EVERY DAY 90 tablet 1    loratadine (CLARITIN) 10 mg tablet Take 1 tablet by mouth daily as needed.      metoprolol succinate (TOPROL-XL) 25 MG 24 hr tablet Take 0.5 tablets (12.5 mg total) by mouth once daily. 15 tablet 11    montelukast (SINGULAIR) 10 mg tablet TAKE 1 TABLET EVERY EVENING 90 tablet 1    multivitamin capsule Take 1 capsule by mouth once daily.      rosuvastatin (CRESTOR) 20 MG tablet Take 1 tablet (20 mg total) by mouth every evening. 90 tablet 3    tamsulosin (FLOMAX) 0.4 mg Cap Take 1 capsule (0.4 mg total) by mouth once daily. 90 capsule 3    telmisartan-hydrochlorothiazide (MICARDIS HCT) 80-25 mg per tablet Take 1 tablet by mouth once  "daily. 90 tablet 3    ticagrelor (BRILINTA) 90 mg tablet Take 1 tablet (90 mg total) by mouth 2 (two) times daily. 60 tablet 11    [DISCONTINUED] ketoconazole (NIZORAL) 2 % cream Apply topically 2 (two) times daily. (Patient not taking: Reported on 12/3/2019) 15 g 0    [DISCONTINUED] krill-om-3-dha-epa-phospho-ast (KRILL OIL) 1,930-107-25-80 mg Cap Take 2 tablets by mouth.       No current facility-administered medications on file prior to visit.        SOCIAL HISTORY:   Social History     Tobacco Use   Smoking Status Never Smoker   Smokeless Tobacco Never Used       Social History     Substance and Sexual Activity   Alcohol Use Not Currently    Comment: Previous heavy alcohol use (2-3 beers and 2-3 glasses of wine daily x 40 years). Stopped alcohol end of August 2019       Social History     Substance and Sexual Activity   Drug Use No       ROS:   GENERAL: Denies fever, chills, weight loss/gain, fatigue  HEENT: Denies headaches, dizziness, vision/hearing changes  CARDIOVASCULAR: Denies chest pain, palpitations, or edema  RESPIRATORY: Denies dyspnea, cough  GI: Denies abdominal pain, rectal bleeding, nausea, vomiting. No change in bowel pattern or color  : Denies dysuria, hematuria   SKIN: Denies rash, itching   NEURO: Denies confusion, memory loss, or mood changes  PSYCH: Denies depression or anxiety  HEME/LYMPH: Denies easy bruising or bleeding    Objective Findings:    PHYSICAL EXAM:   Friendly White male, in no acute distress; alert and oriented to person, place and time  VITALS: /67 (BP Location: Right arm, Patient Position: Sitting, BP Method: Medium (Automatic))   Pulse 71   Ht 5' 7" (1.702 m)   Wt 115.1 kg (253 lb 12 oz)   SpO2 97%   BMI 39.74 kg/m²   HENT: Normocephalic, without obvious abnormality. Oral mucosa pink and moist. Dentition good.  EYES: Sclerae anicteric. No conjunctival pallor.   NECK: Supple. No masses or cervical adenopathy.  CARDIOVASCULAR: Regular rate and rhythm. No " murmurs.  RESPIRATORY: Normal respiratory effort. BBS CTA. No wheezes or crackles.  GI: Soft, non-tender, non-distended. + hepatomegaly. No masses palpable. No ascites.  EXTREMITIES:  No clubbing, cyanosis or edema.  SKIN: Warm and dry. No jaundice. No rashes noted to exposed skin. + telangectasias noted. No palmar erythema.  NEURO:  Normal gait. No asterixis.  PSYCH:  Memory intact. Thought and speech pattern appropriate. Behavior normal. No depression or anxiety noted.    DIAGNOSTIC STUDIES:  ABD. U/S-    Done 2019       FINDINGS:  Visualized pancreas, aorta, inferior vena cava, portal and hepatic veins are unremarkable.    The gallbladder, kidneys and spleen are unremarkable.  No ascites.  No intra or extrahepatic biliary dilatation.    The liver is markedly hyperechoic.  No focal lesions were detected.    The CBD measures 4 mm.       Impression         Markedly hyperechoic liver, suggesting severe hepatic steatosis    Otherwise, unremarkable exam         LIVER BIOPSY-   None      FIBROSCAN - done 10/2019  Fibroscan readin.1 KPa   Fibrosis:F 0-1    CAP readin dB/m   Steatosis: :S3       EDUCATION:  We discussed the manifestations of non-alcoholic fatty liver disease. At this time, there are no FDA approved therapy for non-alcoholic fatty liver disease.  The patient and I discussed the importance of diet, exercise, and weight loss for management of NAFLD. We discussed a low fat, low carb/sugar, high fiber diet and a goal of 30 minutes of exercise 5 times per week    Discussed that fatty liver can progress to steatohepatitis and possibly to cirrhosis, putting one at increased risk for liver cancer, liver failure, and death    Recommend to avoid alcohol consumption. It is know that heavy alcohol use is associated with disease progression among patients with fatty liver disease. Information is unknown at this time of the effects on the liver with alcohol use in moderation.      ASSESSMENT & PLAN:  64 y.o.  White male with:  1.  Fatty liver, likely previous alcohol + metabolic risk factors  -- Risk factors for fatty liver include previous alcohol use, morbid obesity, HTN, HLD  -- Fibroscan 10/2019 F0-1 (kPA 7.1), S3 steatosis  -- Abd U/S done 9/3/19 showed The liver is markedly hyperechoic.  No focal lesions were detected. No splenomegaly  - ALT > AST, intermittently elevated since 2016  - Synthetic liver function WNL  -- Immunity to Hep A and B : completed Hep B vaccine series     2. Elevated liver enzymes  -- Serological workup was negative for Daniel's, alpha-1 antitrypsin deficiency, hemochromatosis, autoimmune etiology, and viral hepatitis C.      3. Obesity, HTN, HLD  Body mass index is 39.74 kg/m².   -- increases risk for fatty liver  -- recommendations to pt:  1. Weight loss goal of 25-35 lbs  2. Low carb/sugar, high fiber and protein diet  3. Exercise, 5 days per week, 30 minutes per day, as tolerated  4. Recommend good cholesterol, blood pressure, blood sugar levels        Follow up in about 1 year (around 2/11/2021). with labs and fibroscan before  Orders Placed This Encounter   Procedures    FibroScan (Vibration Controlled Transient Elastography)    Hepatic function panel       Thank you for allowing me to participate in the care of ALLY Rivas    CC'ed note to:   Dae Romo MD

## 2020-05-12 ENCOUNTER — PATIENT MESSAGE (OUTPATIENT)
Dept: ORTHOPEDICS | Facility: CLINIC | Age: 65
End: 2020-05-12

## 2020-05-21 ENCOUNTER — TELEPHONE (OUTPATIENT)
Dept: ORTHOPEDICS | Facility: CLINIC | Age: 65
End: 2020-05-21

## 2020-05-21 ENCOUNTER — PATIENT MESSAGE (OUTPATIENT)
Dept: ORTHOPEDICS | Facility: CLINIC | Age: 65
End: 2020-05-21

## 2020-05-21 DIAGNOSIS — M51.36 DDD (DEGENERATIVE DISC DISEASE), LUMBAR: Primary | ICD-10-CM

## 2020-05-26 ENCOUNTER — TELEPHONE (OUTPATIENT)
Dept: ORTHOPEDICS | Facility: CLINIC | Age: 65
End: 2020-05-26

## 2020-05-26 DIAGNOSIS — M51.34 DDD (DEGENERATIVE DISC DISEASE), THORACIC: Primary | ICD-10-CM

## 2020-06-02 ENCOUNTER — OFFICE VISIT (OUTPATIENT)
Dept: ORTHOPEDICS | Facility: CLINIC | Age: 65
End: 2020-06-02
Payer: MEDICARE

## 2020-06-02 ENCOUNTER — HOSPITAL ENCOUNTER (OUTPATIENT)
Dept: RADIOLOGY | Facility: HOSPITAL | Age: 65
Discharge: HOME | End: 2020-06-02
Attending: PHYSICIAN ASSISTANT
Payer: MEDICARE

## 2020-06-02 VITALS — WEIGHT: 263 LBS | HEIGHT: 67 IN | BODY MASS INDEX: 41.28 KG/M2

## 2020-06-02 DIAGNOSIS — Z98.890 HISTORY OF LUMBAR DISCECTOMY: ICD-10-CM

## 2020-06-02 DIAGNOSIS — M51.34 DDD (DEGENERATIVE DISC DISEASE), THORACIC: ICD-10-CM

## 2020-06-02 DIAGNOSIS — M50.30 DDD (DEGENERATIVE DISC DISEASE), CERVICAL: ICD-10-CM

## 2020-06-02 DIAGNOSIS — M51.36 DDD (DEGENERATIVE DISC DISEASE), LUMBAR: ICD-10-CM

## 2020-06-02 DIAGNOSIS — M54.2 NECK PAIN: Primary | ICD-10-CM

## 2020-06-02 PROCEDURE — 72050 X-RAY EXAM NECK SPINE 4/5VWS: CPT | Mod: 26,,, | Performed by: RADIOLOGY

## 2020-06-02 PROCEDURE — 99214 PR OFFICE/OUTPT VISIT, EST, LEVL IV, 30-39 MIN: ICD-10-PCS | Mod: S$PBB,,, | Performed by: PHYSICIAN ASSISTANT

## 2020-06-02 PROCEDURE — 72050 XR CERVICAL SPINE AP LAT WITH FLEX EXTEN: ICD-10-PCS | Mod: 26,,, | Performed by: RADIOLOGY

## 2020-06-02 PROCEDURE — 99999 PR PBB SHADOW E&M-EST. PATIENT-LVL IV: CPT | Mod: PBBFAC,,, | Performed by: PHYSICIAN ASSISTANT

## 2020-06-02 PROCEDURE — 72070 XR THORACIC SPINE AP LATERAL: ICD-10-PCS | Mod: 26,,, | Performed by: RADIOLOGY

## 2020-06-02 PROCEDURE — 99214 OFFICE O/P EST MOD 30 MIN: CPT | Mod: S$PBB,,, | Performed by: PHYSICIAN ASSISTANT

## 2020-06-02 PROCEDURE — 72070 X-RAY EXAM THORAC SPINE 2VWS: CPT | Mod: TC

## 2020-06-02 PROCEDURE — 72070 X-RAY EXAM THORAC SPINE 2VWS: CPT | Mod: 26,,, | Performed by: RADIOLOGY

## 2020-06-02 PROCEDURE — 72050 X-RAY EXAM NECK SPINE 4/5VWS: CPT | Mod: TC

## 2020-06-02 PROCEDURE — 99999 PR PBB SHADOW E&M-EST. PATIENT-LVL IV: ICD-10-PCS | Mod: PBBFAC,,, | Performed by: PHYSICIAN ASSISTANT

## 2020-06-02 PROCEDURE — 99214 OFFICE O/P EST MOD 30 MIN: CPT | Mod: PBBFAC,25 | Performed by: PHYSICIAN ASSISTANT

## 2020-06-02 RX ORDER — DIAZEPAM 2 MG/1
2 TABLET ORAL
Qty: 2 TABLET | Refills: 0 | Status: SHIPPED | OUTPATIENT
Start: 2020-06-02 | End: 2020-06-16

## 2020-06-02 NOTE — PROGRESS NOTES
DATE: 6/2/2020  PATIENT: Elmer Borrero    Supervising Physician: Abraham Dang M.D.    CHIEF COMPLAINT: neck and back pain    HISTORY:  Elmer Borrero is a 64 y.o. male with PMH of left heart catheterization in November 2019, s/p knee replacement by Dr. Schultz 9/5/2019 here for initial evaluation of low back and neck pain (Back - 0, Neck - 6). He also reports a history of unknown lumbar spine surgery in 2015 in [place].  He does report occasional left arm pain. The pain has been present since he was discharged from the hospital in November. The patient describes the pain as aching and intermittent.  The pain started in the low back.  He thought he had a kidney stone and went to the hospital.  Kidney stones were ruled out.  He started applying heat and the pain improved.  Then his neck started bothering him.  His back still bothers him intermittently.   The pain is worse with activity and improved by heat. There is no associated numbness and tingling. There is subjective weakness.  He reports decreased  strength and difficulty with buttons.  Prior treatments have included tylenol, but no PT, ESIs or recent surgery.  He did have lumbar ESIs prior to his surgery in 2015.        The patient denies myelopathic symptoms such as handwriting changes.  He reports difficulty with buttons/coins/keys. Denies perineal paresthesias, bowel/bladder dysfunction.    PAST MEDICAL/SURGICAL HISTORY:  Past Medical History:   Diagnosis Date    Actinic keratosis     Arthritis     BPH (benign prostatic hyperplasia)     Cataract     Coronary artery disease     Fatty liver     Hyperlipidemia     Hypertension     Kidney stone     Morbid obesity with BMI of 40.0-44.9, adult 10/2/2019    Obesity (BMI 30-39.9) 10/2/2019    GEOVANNA on CPAP     Renal calculi     Subclinical hypothyroidism      Past Surgical History:   Procedure Laterality Date    JOINT REPLACEMENT      left knee    KNEE SURGERY      LEFT HEART  CATHETERIZATION Left 11/27/2019    Procedure: Left heart cath;  Surgeon: Dejan Nielsen MD;  Location: [facility] CATH;  Service: Cardiovascular;  Laterality: Left;    PERCUTANEOUS CRYOTHERAPY OF PERIPHERAL NERVE USING LIQUID NITROUS OXIDE IN CLOSED NEEDLE DEVICE Right 8/26/2019    Procedure: CRYOTHERAPY, NERVE, PERIPHERAL, PERCUTANEOUS, USING LIQUID NITROUS OXIDE IN CLOSED NEEDLE DEVICE;  Surgeon: CHYNA Quevedo;  Location: [facility] CATH LAB;  Service: Pain Management;  Laterality: Right;  Right Knee IOVERA    right knee replacement       SPINE SURGERY      around 2010     TOTAL KNEE ARTHROPLASTY Right 9/5/2019    Procedure: ARTHROPLASTY, KNEE, TOTAL-VINCENZO-SAME DAY;  Surgeon: Arturo Schultz MD;  Location: [facility] OR [address] [place] [address];  Service: Orthopedics;  Laterality: Right;       Medications:   Current Outpatient Medications on File Prior to Visit   Medication Sig Dispense Refill    allopurinol (ZYLOPRIM) 300 MG tablet Take 1 tablet (300 mg total) by mouth once daily. 90 tablet 3    aspirin (ECOTRIN) 81 MG EC tablet TAKE 1 TABLET BY MOUTH EVERY DAY 30 tablet 0    cyclobenzaprine (FLEXERIL) 5 MG tablet Take 1 tablet by mouth nightly.      finasteride (PROSCAR) 5 mg tablet TAKE 1 TABLET BY MOUTH EVERY DAY 90 tablet 1    loratadine (CLARITIN) 10 mg tablet Take 1 tablet by mouth daily as needed.      metoprolol succinate (TOPROL-XL) 25 MG 24 hr tablet Take 0.5 tablets (12.5 mg total) by mouth once daily. 15 tablet 11    montelukast (SINGULAIR) 10 mg tablet TAKE 1 TABLET EVERY EVENING 90 tablet 1    multivitamin capsule Take 1 capsule by mouth once daily.      rosuvastatin (CRESTOR) 20 MG tablet Take 1 tablet (20 mg total) by mouth every evening. 90 tablet 3    tamsulosin (FLOMAX) 0.4 mg Cap Take 1 capsule (0.4 mg total) by mouth once daily. 90 capsule 3    telmisartan-hydrochlorothiazide (MICARDIS HCT) 80-25 mg per tablet TAKE 1 TABLET BY MOUTH EVERY DAY 90 tablet 3    ticagrelor (BRILINTA) 90 mg tablet  Take 1 tablet (90 mg total) by mouth 2 (two) times daily. 60 tablet 11    [DISCONTINUED] predniSONE (DELTASONE) 20 MG tablet Take 2 tablets (40 mg total) by mouth once daily. 10 tablet 0    [DISCONTINUED] ranitidine (ZANTAC) 150 MG tablet TAKE 1 TABLET BY MOUTH EVERY DAY 30 tablet 11    [DISCONTINUED] traMADol (ULTRAM) 50 mg tablet Take 1 tablet (50 mg total) by mouth every 8 (eight) hours as needed for Pain. 21 tablet 0     No current facility-administered medications on file prior to visit.        Social History:   Social History     Socioeconomic History    Marital status:      Spouse name: Not on file    Number of children: 2    Years of education: Not on file    Highest education level: Not on file   Occupational History    Not on file   Social Needs    Financial resource strain: Not on file    Food insecurity:     Worry: Not on file     Inability: Not on file    Transportation needs:     Medical: Not on file     Non-medical: Not on file   Tobacco Use    Smoking status: Never Smoker    Smokeless tobacco: Never Used   Substance and Sexual Activity    Alcohol use: Not Currently     Comment: Previous heavy alcohol use (2-3 beers and 2-3 glasses of wine daily x 40 years). Stopped alcohol end of August 2019    Drug use: No    Sexual activity: Not on file   Lifestyle    Physical activity:     Days per week: Not on file     Minutes per session: Not on file    Stress: Not at all   Relationships    Social connections:     Talks on phone: Not on file     Gets together: Not on file     Attends Congregation service: Not on file     Active member of club or organization: Not on file     Attends meetings of clubs or organizations: Not on file     Relationship status: Not on file   Other Topics Concern    Not on file   Social History Narrative    Not on file       REVIEW OF SYSTEMS:  Constitution: Negative. Negative for chills, fever and night sweats.   Cardiovascular: Negative for chest pain and  "syncope.   Respiratory: Negative for cough and shortness of breath.   Gastrointestinal: See HPI. Negative for nausea/vomiting. Negative for abdominal pain.  Genitourinary: See HPI. Negative for discoloration or dysuria.  Skin: Negative for dry skin, itching and rash.   Hematologic/Lymphatic: Negative for bleeding problem. Does not bruise/bleed easily.   Musculoskeletal: Negative for falls and muscle weakness.   Neurological: See HPI. No seizures.   Endocrine: Negative for polydipsia, polyphagia and polyuria.   Allergic/Immunologic: Negative for hives and persistent infections.     EXAM:  Ht 5' 7" (1.702 m)   Wt 119.3 kg (263 lb)   BMI 41.19 kg/m²     PHYSICAL EXAMINATION:    General: The patient is a very pleasant 64 y.o. male in no apparent distress, the patient is oriented to person, place and time.  Psych: Normal mood and affect  HEENT: Vision grossly intact, hearing intact to the spoken word.  Lungs: Respirations unlabored.  Gait: Normal station and gait, no difficulty with toe or heel walk.   Skin: Cervical skin and dorsal lumbar skin negative for rashes, lesions, hairy patches and surgical scars.    Range of motion: Cervical and lumbar range of motion is acceptable. There is mild tenderness to palpation of the paracervical muscles.  There is mild lumbar tenderness to palpation.  Spinal Balance: Global saggital and coronal spinal balance acceptable, no significant for scoliosis and kyphosis.  Musculoskeletal: No pain with the range of motion of the bilateral shoulders and elbows. Normal bulk and contour of the bilateral hands.  No pain with the range of motion of the bilateral hips. Mild bilateral trochanteric tenderness to palpation.  Vascular: Bilateral upper and lower extremities warm and well perfused, radial pulses 2+ bilaterally, dorsalis pedis pulses 2+ bilaterally.  Neurological: Normal strength and tone in all major motor groups in the bilateral upper and lower extremities. Normal sensation to light " touch in the C5-T1 and L2-S1 dermatomes bilaterally.  Deep tendon reflexes symmetric 2+ in the bilateral upper and lower extremities.  Negative Inverted Radial Reflex and Robison's bilaterally. Negative Babinski bilaterally. Negative straight leg raise bilaterally.     IMAGING:   Today I personally reviewed AP, Lat and Flex/Ex  upright C and T-spine films that demonstrate mild degenerative changes.        Body mass index is 41.19 kg/m².    Hemoglobin A1C   Date Value Ref Range Status   07/09/2019 5.3 4.0 - 5.6 % Final     Comment:     ADA Screening Guidelines:  5.7-6.4%  Consistent with prediabetes  >or=6.5%  Consistent with diabetes  High levels of fetal hemoglobin interfere with the HbA1C  assay. Heterozygous hemoglobin variants (HbS, HgC, etc)do  not significantly interfere with this assay.   However, presence of multiple variants may affect accuracy.     07/26/2018 5.4 4.0 - 5.6 % Final     Comment:     ADA Screening Guidelines:  5.7-6.4%  Consistent with prediabetes  >or=6.5%  Consistent with diabetes  High levels of fetal hemoglobin interfere with the HbA1C  assay. Heterozygous hemoglobin variants (HbS, HgC, etc)do  not significantly interfere with this assay.   However, presence of multiple variants may affect accuracy.     06/28/2017 5.4 4.0 - 5.6 % Final     Comment:     According to ADA guidelines, hemoglobin A1c <7.0% represents  optimal control in non-pregnant diabetic patients. Different  metrics may apply to specific patient populations.   Standards of Medical Care in Diabetes-2016.  For the purpose of screening for the presence of diabetes:  <5.7%     Consistent with the absence of diabetes  5.7-6.4%  Consistent with increasing risk for diabetes   (prediabetes)  >or=6.5%  Consistent with diabetes  Currently, no consensus exists for use of hemoglobin A1c  for diagnosis of diabetes for children.  This Hemoglobin A1c assay has significant interference with fetal   hemoglobin   (HbF). The results are  invalid for patients with abnormal amounts of   HbF,   including those with known Hereditary Persistence   of Fetal Hemoglobin. Heterozygous hemoglobin variants (HbAS, HbAC,   HbAD, HbAE, HbA2) do not significantly interfere with this assay;   however, presence of multiple variants in a sample may impact the %   interference.           ASSESSMENT/PLAN:    Elmer was seen today for neck pain, low-back pain, arm pain and leg pain.    Diagnoses and all orders for this visit:    Neck pain  -     MRI Cervical Spine Without Contrast; Future    DDD (degenerative disc disease), cervical  -     MRI Cervical Spine Without Contrast; Future    History of lumbar discectomy  -     MRI Lumbar Spine W WO Contrast; Future  -     X-Ray Lumbar Spine Ap Lateral w/Flex Ext; Future  -     Creatinine, serum; Future    Other orders  -     diazePAM (VALIUM) 2 MG tablet; Take 1 tablet (2 mg total) by mouth On call Procedure for Anxiety (take 1 tab 30 min prior to procedure.  may take 2nd if needed).        Today we discussed at length all of the different treatment options including anti-inflammatories, acetaminophen, rest, ice, heat, physical therapy including strengthening and stretching exercises, home exercises, ROM, aerobic conditioning, aqua therapy, other modalities including ultrasound, massage, and dry needling, epidural steroid injections and finally surgical intervention.      We will get an MRI cervical and lumbar spine.  I will also get lumbar films.  Follow up after the MRIs to discuss results and further treatment.     Follow up if symptoms worsen or fail to improve.

## 2020-06-18 ENCOUNTER — OFFICE VISIT (OUTPATIENT)
Dept: ORTHOPEDICS | Facility: CLINIC | Age: 65
End: 2020-06-18
Payer: MEDICARE

## 2020-06-18 VITALS — BODY MASS INDEX: 41.91 KG/M2 | WEIGHT: 267 LBS | HEIGHT: 67 IN

## 2020-06-18 DIAGNOSIS — G95.89 MYELOMALACIA OF CERVICAL CORD: ICD-10-CM

## 2020-06-18 DIAGNOSIS — Z98.890 HISTORY OF LUMBAR DISCECTOMY: Primary | ICD-10-CM

## 2020-06-18 PROCEDURE — 99213 PR OFFICE/OUTPT VISIT, EST, LEVL III, 20-29 MIN: ICD-10-PCS | Mod: S$PBB,,, | Performed by: PHYSICIAN ASSISTANT

## 2020-06-18 PROCEDURE — 99213 OFFICE O/P EST LOW 20 MIN: CPT | Mod: PBBFAC | Performed by: PHYSICIAN ASSISTANT

## 2020-06-18 PROCEDURE — 99213 OFFICE O/P EST LOW 20 MIN: CPT | Mod: S$PBB,,, | Performed by: PHYSICIAN ASSISTANT

## 2020-06-18 PROCEDURE — 99999 PR PBB SHADOW E&M-EST. PATIENT-LVL III: ICD-10-PCS | Mod: PBBFAC,,, | Performed by: PHYSICIAN ASSISTANT

## 2020-06-18 PROCEDURE — 99999 PR PBB SHADOW E&M-EST. PATIENT-LVL III: CPT | Mod: PBBFAC,,, | Performed by: PHYSICIAN ASSISTANT

## 2020-06-18 NOTE — PROGRESS NOTES
"  DATE: 6/18/2020  PATIENT: Elmer Borrero    Attending Physician: Abraham Dang M.D.    HISTORY:  Elmer Borrero is a 64 y.o. male who returns to me today for MRI results.  He was last seen by me 6/2/2020.  Today he is doing well but notes he continues to have neck pain.    The Patient denies myelopathic symptoms such as handwriting changes.  He reports difficulty with buttons/coins/keys. Denies perineal paresthesias, bowel/bladder dysfunction.    PMH/PSH/FamHx/SocHx:  Unchanged from prior visit    ROS:  REVIEW OF SYSTEMS:  Constitution: Negative. Negative for chills, fever and night sweats.   HENT: Negative for congestion and headaches.    Eyes: Negative for blurred vision, left vision loss and right vision loss.   Cardiovascular: Negative for chest pain and syncope.   Respiratory: Negative for cough and shortness of breath.    Endocrine: Negative for polydipsia, polyphagia and polyuria.   Hematologic/Lymphatic: Negative for bleeding problem. Does not bruise/bleed easily.   Skin: Negative for dry skin, itching and rash.   Musculoskeletal: Negative for falls and muscle weakness.   Gastrointestinal: Negative for abdominal pain and bowel incontinence.   Allergic/Immunologic: Negative for hives and persistent infections.  Genitourinary: Negative for urinary retention/incontinence and nocturia.   Neurological:  No loss of balance and seizures.   Psychiatric/Behavioral: Negative for depression. The patient does not have insomnia.   Denies myelopathic symptoms, perineal paresthesias, bowel or bladder incontinence    EXAM:  Ht 5' 7" (1.702 m)   Wt 121.1 kg (267 lb)   BMI 41.82 kg/m²     Physical exam stable. Neuro exam stable.     IMAGING:  No new imaging today.    Today I personally re-reviewed AP, Lat and Flex/Ex  upright C-spine films that demonstrate mild degenerative changes.    AP, lat and flex/ex upright L spine films demonstrate grade I anterolisthesis at L4/5.    MRI cervical spine " demonstrates moderate right foraminal stenosis at C5/6.  There is a broad based disc bulge at C3/4 resulting in mild to moderate stenosis and there appears to be myelomalacia at this level.     MRI lumbar spine demonstrates mild foraminal narrowing and facet arthropathy at L4/5.     Body mass index is 41.82 kg/m².    Hemoglobin A1C   Date Value Ref Range Status   07/09/2019 5.3 4.0 - 5.6 % Final     Comment:     ADA Screening Guidelines:  5.7-6.4%  Consistent with prediabetes  >or=6.5%  Consistent with diabetes  High levels of fetal hemoglobin interfere with the HbA1C  assay. Heterozygous hemoglobin variants (HbS, HgC, etc)do  not significantly interfere with this assay.   However, presence of multiple variants may affect accuracy.     07/26/2018 5.4 4.0 - 5.6 % Final     Comment:     ADA Screening Guidelines:  5.7-6.4%  Consistent with prediabetes  >or=6.5%  Consistent with diabetes  High levels of fetal hemoglobin interfere with the HbA1C  assay. Heterozygous hemoglobin variants (HbS, HgC, etc)do  not significantly interfere with this assay.   However, presence of multiple variants may affect accuracy.     06/28/2017 5.4 4.0 - 5.6 % Final     Comment:     According to ADA guidelines, hemoglobin A1c <7.0% represents  optimal control in non-pregnant diabetic patients. Different  metrics may apply to specific patient populations.   Standards of Medical Care in Diabetes-2016.  For the purpose of screening for the presence of diabetes:  <5.7%     Consistent with the absence of diabetes  5.7-6.4%  Consistent with increasing risk for diabetes   (prediabetes)  >or=6.5%  Consistent with diabetes  Currently, no consensus exists for use of hemoglobin A1c  for diagnosis of diabetes for children.  This Hemoglobin A1c assay has significant interference with fetal   hemoglobin   (HbF). The results are invalid for patients with abnormal amounts of   HbF,   including those with known Hereditary Persistence   of Fetal Hemoglobin.  Heterozygous hemoglobin variants (HbAS, HbAC,   HbAD, HbAE, HbA2) do not significantly interfere with this assay;   however, presence of multiple variants in a sample may impact the %   interference.           ASSESSMENT/PLAN:    Elmer was seen today for follow-up.    Diagnoses and all orders for this visit:    History of lumbar discectomy    Myelomalacia of cervical cord        Today we discussed at length all of the different treatment options including anti-inflammatories, acetaminophen, rest, ice, heat, physical therapy including strengthening and stretching exercises, home exercises, ROM, aerobic conditioning, aqua therapy, other modalities including ultrasound, massage, and dry needling, epidural steroid injections and finally surgical intervention.      I had a sit down discussion with the patient regarding cervical myelopathy. I discussed the known stepwise degeneration of cervical myelopathy. I discussed the risks and benefits of decompressive surgery, including the concept that surgery would be done to prevent further neurological injury/degeneration rather than to ameliorate any existing deficits.     I will discuss with Dr. Dang and call the patient to likely pick a day for surgery.

## 2020-06-30 DIAGNOSIS — G95.89 MYELOMALACIA OF CERVICAL CORD: Primary | ICD-10-CM

## 2020-06-30 DIAGNOSIS — M54.2 NECK PAIN: ICD-10-CM

## 2020-07-01 ENCOUNTER — TELEPHONE (OUTPATIENT)
Dept: ORTHOPEDICS | Facility: CLINIC | Age: 65
End: 2020-07-01
Payer: MEDICARE

## 2020-07-01 DIAGNOSIS — Z01.818 PREOP TESTING: Primary | ICD-10-CM

## 2020-07-17 ENCOUNTER — TELEPHONE (OUTPATIENT)
Dept: PREADMISSION TESTING | Facility: HOSPITAL | Age: 65
End: 2020-07-17

## 2020-07-17 ENCOUNTER — OFFICE VISIT (OUTPATIENT)
Dept: ORTHOPEDICS | Facility: CLINIC | Age: 65
End: 2020-07-17
Payer: MEDICARE

## 2020-07-17 VITALS — HEIGHT: 67 IN | WEIGHT: 263 LBS | BODY MASS INDEX: 41.28 KG/M2

## 2020-07-17 DIAGNOSIS — Z01.818 PREOPERATIVE TESTING: Primary | ICD-10-CM

## 2020-07-17 DIAGNOSIS — M50.30 DDD (DEGENERATIVE DISC DISEASE), CERVICAL: Primary | ICD-10-CM

## 2020-07-17 DIAGNOSIS — M79.609 PAIN IN EXTREMITY, UNSPECIFIED EXTREMITY: ICD-10-CM

## 2020-07-17 PROCEDURE — 99999 PR PBB SHADOW E&M-EST. PATIENT-LVL III: ICD-10-PCS | Mod: PBBFAC,,, | Performed by: ORTHOPAEDIC SURGERY

## 2020-07-17 PROCEDURE — 99213 OFFICE O/P EST LOW 20 MIN: CPT | Mod: PBBFAC | Performed by: ORTHOPAEDIC SURGERY

## 2020-07-17 PROCEDURE — 99213 PR OFFICE/OUTPT VISIT, EST, LEVL III, 20-29 MIN: ICD-10-PCS | Mod: S$PBB,,, | Performed by: ORTHOPAEDIC SURGERY

## 2020-07-17 PROCEDURE — 99213 OFFICE O/P EST LOW 20 MIN: CPT | Mod: S$PBB,,, | Performed by: ORTHOPAEDIC SURGERY

## 2020-07-17 PROCEDURE — 99999 PR PBB SHADOW E&M-EST. PATIENT-LVL III: CPT | Mod: PBBFAC,,, | Performed by: ORTHOPAEDIC SURGERY

## 2020-07-17 NOTE — ANESTHESIA PAT ROS NOTE
07/17/2020  Elmer Borrero is a 64 y.o., male.      Pre-op Assessment          Review of Systems  Anesthesia Hx:  Hx of Anesthetic complications  History of prior surgery of interest to airway management or planning: heart surgery. Previous anesthesia: Spinal, MAC  9/2019 knee with spinal.  Procedure performed at an [facility]. Denies Family Hx of Anesthesia complications.  Personal Hx of Anesthesia complications Slow To Awaken/Delayed Emergence and mild, somewhat sensitive to sedation / narcotics   Social:  Non-Smoker, Alcohol Use  Alcohol Use: Pt consumes 1-2 beers and 1-2 glasses wine/night,    Hematology/Oncology:  Hematology Normal   Oncology Normal     EENT/Dental:   chronic allergic rhinitis   Cardiovascular:    Denies Angina. hyperlipidemia 11/2019 echo EF 45-50% Functional Capacity good / => 4 METS  Coronary Artery Disease: S/P Percutaneous Coronary Intervention (PCI)   S/P Drug Eluting Stent (MAURICE), right coronary, drug eluding stent placed 11/2019. Hx of Myocardial Infarction  Valvular Heart Disease: Mitral Regurgitation (MR), mild   Hypertension , Fairly Controlled on RX , Recent typical clinic B/P of 140/74    Pulmonary:   Shortness of breath Denies Recent URI.  Pulmonary Symptoms:  are shortness of breath with activity.  Obstructive Sleep Apnea (GEOVANNA), CPAP used.   Renal/:   renal calculi    Hepatic/GI:  Hernia, Umbilical Hernia Liver Disease, Fatty Liver, Hepatitis (HX Hep B 1980s), acute    Musculoskeletal:  Musculoskeletal General/Symptoms: neck pain, joint pain. Functional capacity is ambulatory without assistance.  Joint Disease:  Arthritis, Osteoarthritis  Spine Disorders: Spinal Stenosis, Cervical Spinal Stenosis Cervical Spine Disorder, Cervical Disc Disease Myelomalacia cervical cord   Neurological:  Neuro Symptoms of weakness Pain , onset is chronic , location of neck ,  alleviating factors are prn tylenol. Osteoarthritis    Endocrine:  Metabolic Disorders, Morbid Obesity / BMI > 40  Psych:  Psychiatric Normal           Physical Exam  General:  Well nourished, Large Beard    Airway/Jaw/Neck:  Airway Findings: Mouth Opening: Normal Tongue: Normal  Jaw/Neck Findings:  Neck ROM: Extension Decreased, Mod., Decreased Lateral Motion, to the right  Neck Findings:  Girth Increased, Short Neck      Dental:  Dental Findings: In tact   Chest/Lungs:  Chest/Lungs Findings: Clear to auscultation, Normal Respiratory Rate     Heart/Vascular:  Heart Findings: Rate: Normal        Mental Status:  Mental Status Findings:  Cooperative, Alert and Oriented       7/27/20 Lab and Covid results noted.    Anesthesia Assessment: Preoperative EQUATION    Planned Procedure: Procedure(s) (LRB):  LAMINECTOMY, SPINE, CERVICAL, WITH POSTERIOR FUSION C3-6 Depuy SNS:Motors/SSEP/EMG Old Jer + Pads + tongs  (N/A)  Requested Anesthesia Type:General  Surgeon: Abraham Dang MD  Service: Orthopedics  Known or anticipated Date of Surgery:7/27/2020, Date change 7/28/2020    Surgeon notes: Note not completed    Electronic QUestionnaire Assessment completed via nurse interview with patient.        Triage considerations:     Previous anesthesia records:Spinal Anesthesia  and No problems  9/5/219 ARTHROPLASTY, KNEE, TOTAL-VINCENZO-SAME DAY (Right Knee)   Airway/Jaw/Neck:  Airway Findings: Mouth Opening: Normal Jaw/Neck Findings:  Neck ROM: Normal ROM  Airway Present Prior to Hospital Arrival?: No Placement Date: 09/05/19 Placement Time: 0850 Inserted by: CRNA Airway Device: Mask Removal Date: 09/05/19 Removal Time: 1120     ** Patient stated with general anesthesia is hard to wake up**    Last PCP note: within 3 months , within [facility]   Subspecialty notes: Hepatology, Ortho    Other important co-morbidities:per epic:  HLD, HTN, Morbid Obesity, GEOVANNA and CAD, FATTY LIVER      Tests already available:  Available tests,   within 3 months , 6-12 months ago , within [facility] .6/16/2020 CBC, CMP, UA, 6/5/2020 MRI LUMBAR SPINE W/O CONTRAST, MRI CERVICAL SPINE W/O CONTRAST, XRAY LUMBAR SPINE AP/LAT W FL/EX, 11/28/2019 EKG             Instructions given. (See in Nurse's note)    Optimization:  Anesthesia Preop Clinic Assessment  Indicated    Medical Opinion Indicated       Sub-specialist consult indicated:   CARDIOLOGY       Plan:    Testing:  PT/INR, PTT and T&S   Pre-anesthesia  visit       Visit focus: concerns in complex and/or prolonged anesthesia, past history of problem with anesthesia( HARD TO WAKE UP)     Consultation:Patient's PCP for re-evaluation     Patient  has previously scheduled Medical Appointment:7/25 COVID TESTING    Navigation: Tests Scheduled. TBD             Consults scheduled.TBD             Results will be tracked by Preop Clinic.  7/22 Cardiac clearance given by Dr. Dejan Nielsen on 7/21.May hold brilinta but stay on asa 81mg per Dr. Nielsen. ( scanned to media).Patients last dose of Brilinta was 7/18.  7/23 Medical clearance given by Dr. Dae Siu on 7/22.   Lilian Light RN BSN

## 2020-07-17 NOTE — TELEPHONE ENCOUNTER
----- Message from Lilian Light RN sent at 7/17/2020  2:19 PM CDT -----  Surgery 7/27   Please schedule poc appt, and labs.  Thanks!

## 2020-07-17 NOTE — PRE-PROCEDURE INSTRUCTIONS
Patient stated did not have any problem with knee surgery with anesthesia. He stated has  Trouble with general anesthesia being hard to wake up. Will need medical clearance from your PCP, Dr. Dae Siu and Card clearance from Dr. Dejan Nielsen.He will call to make appts with each. I will ask Dr. Nielsen for asa 81 mg and brillinta instructions.Preop instructions given. Hold other asa containing products, nsaids, vitamins and supplements one week prior to surgery. Verbalizes understanding.

## 2020-07-20 ENCOUNTER — ANESTHESIA EVENT (OUTPATIENT)
Dept: SURGERY | Facility: HOSPITAL | Age: 65
DRG: 472 | End: 2020-07-20
Payer: MEDICARE

## 2020-07-20 NOTE — ANESTHESIA PREPROCEDURE EVALUATION
[facility]  Anesthesia Pre-Operative Evaluation         Patient Name: Elmer Borrero  YOB: 1955  MRN: [id number]    SUBJECTIVE:     Pre-operative evaluation for Procedure(s) (LRB):  LAMINECTOMY, SPINE, CERVICAL, WITH POSTERIOR FUSION C3-6 Depuy SNS:Motors/SSEP/EMG Old Jer + Pads + tongs  (N/A)     07/27/2020    Elmer Borrero is a 64 y.o. male w/ a significant PMHx of CAD and PCI, hypertension, hyperlipidemia, GEOVANNA on CPAP and cervicalgia.   Procedure:  Left heart catheterization with angioplasty and stenting of distal right coronary artery 11/27/19 per Dr. Nielsen ([facility]) who has cleared him for surgery.  He was instructed to hold Brilenta for 5 days preop.  He is on Micardis and metoprolol.    Patient now presents for the above procedure(s).      LDA: None documented.  Prev airway: None documented.    Drips: None documented.      Patient Active Problem List   Diagnosis    Subcutaneous mass of supraclavicular area    Cataracts, bilateral    Mixed hyperlipidemia    Umbilical hernia    Joint pain    Special screening for malignant neoplasms, colon    Agatston CAC score, >200 - 399    Hyperglycemia    Pulmonary nodule seen on imaging study    GEOVANNA on CPAP    Hypertension    BPH (benign prostatic hyperplasia)    Arthritis    Adiposity    Mass of shoulder region    Chronic pain of left knee    Subclinical hypothyroidism    AK (actinic keratosis)    Impaired functional mobility, balance, gait, and endurance    Osteoarthritis of right knee    NSAID long-term use    Rash    Personal history of spine surgery    Excessive drinking alcohol    Elevated liver enzymes    Edema    Elevated total protein    Fatty liver    S/P knee replacement    Obesity (BMI 30-39.9)    Chest pain    Abnormal myocardial perfusion study    CAD (coronary artery disease)       Review of patient's allergies indicates:   Allergen Reactions    Ace inhibitors  Other (See Comments)     cough       Current Inpatient Medications:      No current facility-administered medications on file prior to encounter.      Current Outpatient Medications on File Prior to Encounter   Medication Sig Dispense Refill    allopurinol (ZYLOPRIM) 300 MG tablet Take 1 tablet (300 mg total) by mouth once daily. (Patient taking differently: Take 300 mg by mouth once daily. Take if needed) 90 tablet 3    cyclobenzaprine (FLEXERIL) 5 MG tablet Take 1 tablet by mouth nightly. Take if needed      finasteride (PROSCAR) 5 mg tablet TAKE 1 TABLET BY MOUTH EVERY DAY (Patient taking differently: Take in am of surgery) 90 tablet 1    loratadine (CLARITIN) 10 mg tablet Take 1 tablet by mouth daily as needed. Take if needed      metoprolol succinate (TOPROL-XL) 25 MG 24 hr tablet Take 0.5 tablets (12.5 mg total) by mouth once daily. (Patient taking differently: Take 12.5 mg by mouth once daily. Take in am of surgery) 15 tablet 11    montelukast (SINGULAIR) 10 mg tablet TAKE 1 TABLET EVERY EVENING (Patient taking differently: Takes at night) 90 tablet 1    multivitamin capsule Take 1 capsule by mouth once daily. Hold for 1 week prior to surgery      rosuvastatin (CRESTOR) 20 MG tablet Take 1 tablet (20 mg total) by mouth every evening. (Patient taking differently: Take 20 mg by mouth every evening. Takes at night) 90 tablet 3    tamsulosin (FLOMAX) 0.4 mg Cap Take 1 capsule (0.4 mg total) by mouth once daily. (Patient taking differently: Take 1 capsule by mouth once daily. Take in am of surgery) 90 capsule 3    telmisartan-hydrochlorothiazide (MICARDIS HCT) 80-25 mg per tablet TAKE 1 TABLET BY MOUTH EVERY DAY (Patient taking differently: Hold am of surgery) 90 tablet 3    ticagrelor (BRILINTA) 90 mg tablet Take 1 tablet (90 mg total) by mouth 2 (two) times daily. (Patient taking differently: Take 90 mg by mouth 2 (two) times daily. Stopped per Dr Nielsen, last dose was 7/18/20.) 60 tablet 11        Past Surgical History:   Procedure Laterality Date    JOINT REPLACEMENT      left knee    KNEE SURGERY      LEFT HEART CATHETERIZATION Left 11/27/2019    Procedure: Left heart cath;  Surgeon: Dejan Nielsen MD;  Location: [facility] CATH;  Service: Cardiovascular;  Laterality: Left;    PERCUTANEOUS CRYOTHERAPY OF PERIPHERAL NERVE USING LIQUID NITROUS OXIDE IN CLOSED NEEDLE DEVICE Right 8/26/2019    Procedure: CRYOTHERAPY, NERVE, PERIPHERAL, PERCUTANEOUS, USING LIQUID NITROUS OXIDE IN CLOSED NEEDLE DEVICE;  Surgeon: CHYNA Quevedo;  Location: [facility] CATH LAB;  Service: Pain Management;  Laterality: Right;  Right Knee IOVERA    right knee replacement       SPINE SURGERY      around 2010     TOTAL KNEE ARTHROPLASTY Right 9/5/2019    Procedure: ARTHROPLASTY, KNEE, TOTAL-VINCENZO-SAME DAY;  Surgeon: Arturo Schultz MD;  Location: [facility] OR [address] [place] [address];  Service: Orthopedics;  Laterality: Right;       OBJECTIVE:     Vital Signs Range (Last 24H):         Significant Labs:  Lab Results   Component Value Date    WBC 5.00 07/22/2020    HGB 14.0 07/22/2020    HCT 40.8 07/22/2020     07/22/2020    CHOL 209 (H) 07/09/2019    TRIG 167 (H) 07/09/2019    HDL 48 07/09/2019    ALT 36 07/22/2020    AST 32 07/22/2020     07/22/2020    K 4.2 07/22/2020     07/22/2020    CREATININE 0.90 07/22/2020    BUN 17 07/22/2020    CO2 31 (H) 07/22/2020    TSH 4.949 (H) 07/09/2019    PSA 0.82 07/09/2019    INR 0.9 07/24/2020    HGBA1C 5.3 07/09/2019       Diagnostic Studies:     11/26/19  NM stress test    Stress Testing Impression   1. Stress EKG is normal.   2. The heart rate recovery is normal.     Nuclear Impression   1. This is an abnormal perfusion study.   2. This scan is suggestive of high risk for future cardiovascular   events.     3. Large partially reversible perfusion abnormality of severe   intensity in the inferior lateral region. Medium reversible perfusion   abnormality of mild intensity in the  inferior region.     4. The left ventricular cavity is noted to be normal on the stress   study. The left ventricular ejection fraction was calculated to be 62%   and left ventricular global function is normal.     5. The study quality is excellent.       EKG:   Results for orders placed or performed during the hospital encounter of 11/26/19   EKG 12-LEAD starting tomorrow    Collection Time: 11/28/19  7:28 AM    Narrative    Test Reason : I25.10,    Vent. Rate : 069 BPM     Atrial Rate : 069 BPM     P-R Int : 160 ms          QRS Dur : 082 ms      QT Int : 394 ms       P-R-T Axes : 051 049 037 degrees     QTc Int : 422 ms    Normal sinus rhythm  Normal ECG  When compared with ECG of 26-NOV-2019 13:48,  Premature ventricular complexes are no longer Present  ST no longer depressed in Anterior-lateral leads  Nonspecific T wave abnormality now evident in Inferior leads  T wave amplitude has decreased in Lateral leads  Confirmed by Dejan Nielsen MD ([id number]) on 11/28/2019 9:52:18 AM    Referred By: Dejan Nielsen MD           Confirmed By:Dejan Nielsen MD       2D ECHO:  TTE:  No results found for this or any previous visit.    ROMAINE:  No results found for this or any previous visit.    ASSESSMENT/PLAN:                                                                                                                  07/20/2020  Elmer Borrero is a 64 y.o., male.  Patient history reviewed, being cleared through the preop clinic, awaiting PCP and cardiac clearance.   Anesthesia Evaluation    I have reviewed the Patient Summary Reports.     I have reviewed the Nursing Notes. I have reviewed the NPO Status.   I have reviewed the Medications.     Review of Systems  Anesthesia Hx:  No problems with previous Anesthesia Hx of Anesthetic complications  History of prior surgery of interest to airway management or planning: heart surgery. Previous anesthesia: Spinal, MAC  9/2019 knee with spinal.  Procedure performed at an [facility]. Denies Family Hx of Anesthesia complications.  Personal Hx of Anesthesia complications Slow To Awaken/Delayed Emergence and mild, somewhat sensitive to sedation / narcotics   Social:  Non-Smoker  Alcohol Use: Pt consumes 1-2 beers and 1-2 glasses wine/night,    Hematology/Oncology:  Hematology Normal   Oncology Normal     EENT/Dental:   chronic allergic rhinitis   Cardiovascular:   CABG/stent  Denies Angina. hyperlipidemia 11/2019 echo EF 45-50% Functional Capacity good / => 4 METS  Coronary Artery Disease: S/P Percutaneous Coronary Intervention (PCI)   S/P Drug Eluting Stent (MAURICE), right coronary, drug eluding stent placed 11/2019. Hx of Myocardial Infarction  Valvular Heart Disease: Mitral Regurgitation (MR), mild   Hypertension , Fairly Controlled on RX , Recent typical clinic B/P of 140/74    Pulmonary:  Pulmonary Normal Shortness of breath  Denies Recent URI.  Pulmonary Symptoms:  are shortness of breath with activity.  Obstructive Sleep Apnea (GEOVANNA), CPAP used.   Renal/:   renal calculi    Hepatic/GI:  Hernia, Umbilical Hernia Liver Disease, Fatty Liver, Hepatitis (HX Hep B 1980s), acute    Musculoskeletal:  Musculoskeletal General/Symptoms: neck pain, joint pain. Functional capacity is ambulatory without assistance.  Joint Disease:  Arthritis, Osteoarthritis  Spine Disorders: Spinal Stenosis, Cervical Spinal Stenosis Cervical Spine Disorder, Cervical Disc Disease Myelomalacia cervical cord   Neurological:  Neurology Normal  Neuro Symptoms of weakness Pain , onset is chronic , location of neck , alleviating factors are prn tylenol. Osteoarthritis    Endocrine:  Metabolic Disorders, Morbid Obesity / BMI > 40  Psych:  Psychiatric Normal           Physical Exam  General:  Large Russell, Morbid Obesity    Airway/Jaw/Neck:  Airway Findings: Mouth Opening: Normal Tongue: Normal  General Airway Assessment: Adult, Possible difficult mask airway, Possible difficult intubation  Mallampati: I  Jaw/Neck Findings:   Neck ROM: Extension Decreased, Mod., Decreased Lateral Motion, to the right  Neck Findings:  Girth Increased, Short Neck      Dental:  Dental Findings: In tact   Chest/Lungs:  Chest/Lungs Findings: Clear to auscultation, Normal Respiratory Rate     Heart/Vascular:  Heart Findings: Rate: Normal        Mental Status:  Mental Status Findings:  Cooperative, Alert and Oriented         Anesthesia Plan  Type of Anesthesia, risks & benefits discussed:  Anesthesia Type:  general  Patient's Preference:   Intra-op Monitoring Plan: arterial line and standard ASA monitors  Intra-op Monitoring Plan Comments:   Post Op Pain Control Plan: multimodal analgesia, per primary service following discharge from PACU and IV/PO Opioids PRN  Post Op Pain Control Plan Comments:   Induction:   IV  Beta Blocker:  Patient is on a Beta-Blocker and has received one dose within the past 24 hours (No further documentation required).       Informed Consent: Patient understands risks and agrees with Anesthesia plan.  Questions answered. Anesthesia consent signed with patient.  ASA Score: 3     Day of Surgery Review of History & Physical:    H&P update referred to the surgeon.     Anesthesia Plan Notes: Art line  2 large bore IV  TIVA  No paralysis   Prone         Ready For Surgery From Anesthesia Perspective.

## 2020-07-21 ENCOUNTER — TELEPHONE (OUTPATIENT)
Dept: PREADMISSION TESTING | Facility: HOSPITAL | Age: 65
End: 2020-07-21

## 2020-07-21 NOTE — TELEPHONE ENCOUNTER
----- Message from Nova Mckeon PA-C sent at 7/20/2020  7:57 AM CDT -----  Good morning,    This patient is scheduled for preop testing on Friday.  His surgery is the following Monday.  Dr. Dang is concerned that may not be enough time.  Is there anyway you can see him sooner than Friday?    Thanks,  Nova

## 2020-07-22 NOTE — PROGRESS NOTES
The patient presents for preop.  He has symptomatic cervical stenosis and we are planning a C3-6 laminectomy and fusion.    I had a sit down discussion with the patient and his wife regarding a C3-6 laminectomy and fusion    I spent 15 minutes with the patient of which greater than 1/2 the time was devoted to counciling the patient regarding treatment options.  . We specifically discussed the risks, benefits, and alternatives to surgery. We discussed the surgical procedure including the skin incision, nerve decompression, bone fusion, allograft and/or iliac crest bone graft, and surgical implants including lateral mass screws and pedicle screws as indicated: they understand the risks include but are not limited to death, paralysis, blindness, bleeding, infection, damage to arteries, veins and nerves, vertebral artery injury, dysphagia, spinal fluid leak, continued or worsening pain, no improvement in symptoms, non-union, and the possible need for more surgery in the future, as well as the possibility other unforseen and unknown complications. We talked about expected hospital stay and recovery period. All questions were answered; they understand and wish to proceed

## 2020-07-24 ENCOUNTER — TELEPHONE (OUTPATIENT)
Dept: SURGERY | Facility: CLINIC | Age: 65
End: 2020-07-24

## 2020-07-24 ENCOUNTER — LAB VISIT (OUTPATIENT)
Dept: SURGERY | Facility: CLINIC | Age: 65
End: 2020-07-24
Payer: MEDICARE

## 2020-07-24 ENCOUNTER — HOSPITAL ENCOUNTER (OUTPATIENT)
Dept: PREADMISSION TESTING | Facility: HOSPITAL | Age: 65
Discharge: HOME | End: 2020-07-24
Attending: ANESTHESIOLOGY
Payer: MEDICARE

## 2020-07-24 VITALS
SYSTOLIC BLOOD PRESSURE: 140 MMHG | HEART RATE: 72 BPM | WEIGHT: 269 LBS | DIASTOLIC BLOOD PRESSURE: 74 MMHG | HEIGHT: 67 IN | BODY MASS INDEX: 42.22 KG/M2 | RESPIRATION RATE: 18 BRPM | OXYGEN SATURATION: 97 % | TEMPERATURE: 99 F

## 2020-07-24 DIAGNOSIS — Z01.818 PREOP TESTING: ICD-10-CM

## 2020-07-24 RX ORDER — MULTIVIT WITH MINERALS/HERBS
1 TABLET ORAL DAILY
COMMUNITY
End: 2021-12-16

## 2020-07-24 RX ORDER — AMOXICILLIN 500 MG
1 CAPSULE ORAL DAILY
COMMUNITY

## 2020-07-24 NOTE — DISCHARGE INSTRUCTIONS
Your surgery has been scheduled for:__________________________________________    You should report to:  ____[facility], located on the [place] side of the first floor of the           [facility] ([phone])  ____[facility], located on the [facility] side of the            Second floor of the [facility] ([phone])  ____[facility] ([facility]) Located at [address]  Please Note   - Tell your doctor if you take Aspirin, products containing Aspirin, herbal medications  or blood thinners, such as Coumadin, Ticlid, or Plavix.  (Consult your provider regarding holding or stopping before surgery).  - Arrange for someone to drive you home following surgery.  You will not be allowed to leave the surgical facility alone or drive yourself home following sedation and anesthesia.  Before Surgery  - Stop taking all herbal medications 14days prior to surgery  - No Motrin/Advil (Ibuprofen) 7 days before surgery  - No Aleve (Naproxen) 7 days before surgery  - Stop Taking Aspirin, products containing Aspirin _____days before surgery  - Stop taking blood thinners_______days before surgery  - No Goody's/BC  Powder 7 days before surgery  - Refrain from drinking alcoholic beverages for 24hours before and after surgery  - Stop or limit smoking _________days before surgery  - You may take Tylenol for pain  Night before Surgery   Stop ALL solid food, gum, candy (including vitamins) 8 hours before arrival time.  (Please note: If your surgeon gives you different eating and drinking instructions, please follow surgeon's directions.)   Stop all CLOUDY liquids: coffee with creamer, formula, tube feeds, cloudy juices, non-human milk and breast milk with additives, 6 hours prior to arrival time.   Stop plain breast milk 4 hours prior to arrival time.   The patient should be ENCOURAGED to drink carbohydrate-rich clear liquids (sports  drinks, clear juices) until 2 hours prior to arrival time.   CLEAR liquids include only water, black coffee NO creamer, clear oral rehydration drinks, clear sports drinks or clear fruit juices (no orange juice, no pulpy juices, no apple cider). Advise patients if they can read newsprint through the liquid, it qualifies as clear liquid.    IF IN DOUBT, drink water instead.   - Take a shower or bath (shower is recommended).  Bathe with Hibiclens soap or an antibacterial soap from the neck down.  If not supplied by your surgeon, hibiclens soap will need to be purchased over the counter in pharmacy.  Rinse soap off thoroughly.  - Shampoo your hair with your regular shampoo  The Day of Surgery  · NOTHING TO  DRINK 2 hours before arrival time. If you are told to take medication on the morning of surgery, it may be taken with a sip of water.   - Take another bath or shower with hibiclens or any antibacterial soap, to reduce the chance of infection.  - Take heart and blood pressure medications with a small sip of water, as advised by the perioperative team.  - Do not take fluid pills  - You may brush your teeth and rinse your mouth, but do not swallow any additional water.   - Do not apply perfumes, powder, body lotions or deodorant on the day of surgery.  - Nail polish should be removed.  - Do not wear makeup or moisturizer  - Wear comfortable clothes, such as a button front shirt and loose fitting pants.  - Leave all jewelry, including body piercings, and valuables at home.    - Bring any devices you will neeed after surgery such as crutches or canes.  - If you have sleep apnea, please bring your CPAP machine  In the event that your physical condition changes including the onset of a cold or respiratory illness, or if you have to delay or cancel your surgery, please notify your surgeon.  Anesthesia: General Anesthesia     You are watched continuously during your procedure by your anesthesia provider.     Youre due to  have surgery. During surgery, youll be given medicine called anesthesia or anesthetic. This will keep you comfortable and pain-free. Your anesthesia provider will use general anesthesia.  What is general anesthesia?  General anesthesia puts you into a state like deep sleep. It goes into the bloodstream (IV anesthetics), into the lungs (gas anesthetics), or both. You feel nothing during the procedure. You will not remember it. During the procedure, the anesthesia provider monitors you continuously. He or she checks your heart rate and rhythm, blood pressure, breathing, and blood oxygen.  · IV anesthetics. IV anesthetics are given through an IV line in your arm. Theyre often given first. This is so you are asleep before a gas anesthetic is started. Some kinds of IV anesthetics relieve pain. Others relax you. Your doctor will decide which kind is best in your case.  · Gas anesthetics. Gas anesthetics are breathed into the lungs. They are often used to keep you asleep. They can be given through a facemask or a tube placed in your larynx or trachea (breathing tube).  ? If you have a facemask, your anesthesia provider will most likely place it over your nose and mouth while youre still awake. Youll breathe oxygen through the mask as your IV anesthetic is started. Gas anesthetic may be added through the mask.  ? If you have a tube in the larynx or trachea, it will be inserted into your throat after youre asleep.  Anesthesia tools and medicines  You will likely have:  · IV anesthetics. These are put into an IV line into your bloodstream.  · Gas anesthetics. You breathe these anesthetics into your lungs, where they pass into your bloodstream.  · Pulse oximeter. This is a small clip that is attached to the end of your finger. This measures your blood oxygen level.  · Electrocardiography leads (electrodes). These are small sticky pads that are placed on your chest. They record your heart rate and rhythm.  · Blood pressure  cuff. This reads your blood pressure.  Risks and possible complications  General anesthesia has some risks. These include:  · Breathing problems  · Nausea and vomiting  · Sore throat or hoarseness (usually temporary)  · Allergic reaction to the anesthetic  · Irregular heartbeat (rare)  · Cardiac arrest (rare)   Anesthesia safety  · Follow all instructions you are given for how long not to eat or drink before your procedure.  · Be sure your doctor knows what medicines and drugs you take. This includes over-the-counter medicines, herbs, supplements, alcohol or other drugs. You will be asked when those were last taken.  · Have an adult family member or friend drive you home after the procedure.  · For the first 24 hours after your surgery:  ? Do not drive or use heavy equipment.  ? Do not make important decisions or sign legal documents. If important decisions or signing legal documents is necessary during the first 24 hours after surgery, have a trusted family member or spouse act on your behalf.  ? Avoid alcohol.  ? Have a responsible adult stay with you. He or she can watch for problems and help keep you safe.  Date Last Reviewed: 12/1/2016 © 2000-2017 [employer]. [address] [place] [address]. All rights reserved. This information is not intended as a substitute for professional medical care. Always follow your healthcare professional's instructions

## 2020-07-27 ENCOUNTER — ANESTHESIA (OUTPATIENT)
Dept: SURGERY | Facility: HOSPITAL | Age: 65
DRG: 472 | End: 2020-07-27
Payer: MEDICARE

## 2020-07-28 ENCOUNTER — HOSPITAL ENCOUNTER (INPATIENT)
Facility: HOSPITAL | Age: 65
LOS: 5 days | Discharge: HOME | DRG: 472 | End: 2020-08-02
Attending: ORTHOPAEDIC SURGERY | Admitting: ORTHOPAEDIC SURGERY
Payer: MEDICARE

## 2020-07-28 DIAGNOSIS — M48.02 CERVICAL STENOSIS OF SPINE: ICD-10-CM

## 2020-07-28 LAB
ALBUMIN SERPL BCP-MCNC: 3.8 G/DL (ref 3.5–5.2)
ALP SERPL-CCNC: 54 U/L (ref 55–135)
ALT SERPL W/O P-5'-P-CCNC: 36 U/L (ref 10–44)
ANION GAP SERPL CALC-SCNC: 10 MMOL/L (ref 8–16)
AST SERPL-CCNC: 28 U/L (ref 10–40)
BASOPHILS # BLD AUTO: 0.02 K/UL (ref 0–0.2)
BASOPHILS NFR BLD: 0.3 % (ref 0–1.9)
BILIRUB SERPL-MCNC: 0.4 MG/DL (ref 0.1–1)
BUN SERPL-MCNC: 14 MG/DL (ref 8–23)
CALCIUM SERPL-MCNC: 8.8 MG/DL (ref 8.7–10.5)
CHLORIDE SERPL-SCNC: 104 MMOL/L (ref 95–110)
CO2 SERPL-SCNC: 22 MMOL/L (ref 23–29)
CREAT SERPL-MCNC: 0.8 MG/DL (ref 0.5–1.4)
DIFFERENTIAL METHOD: ABNORMAL
EOSINOPHIL # BLD AUTO: 0 K/UL (ref 0–0.5)
EOSINOPHIL NFR BLD: 0.3 % (ref 0–8)
ERYTHROCYTE [DISTWIDTH] IN BLOOD BY AUTOMATED COUNT: 12.2 % (ref 11.5–14.5)
EST. GFR  (AFRICAN AMERICAN): >60 ML/MIN/1.73 M^2
EST. GFR  (NON AFRICAN AMERICAN): >60 ML/MIN/1.73 M^2
GLUCOSE SERPL-MCNC: 153 MG/DL (ref 70–110)
HCT VFR BLD AUTO: 38.4 % (ref 40–54)
HGB BLD-MCNC: 12.8 G/DL (ref 14–18)
IMM GRANULOCYTES # BLD AUTO: 0.02 K/UL (ref 0–0.04)
IMM GRANULOCYTES NFR BLD AUTO: 0.3 % (ref 0–0.5)
LYMPHOCYTES # BLD AUTO: 0.8 K/UL (ref 1–4.8)
LYMPHOCYTES NFR BLD: 14.1 % (ref 18–48)
MCH RBC QN AUTO: 31.6 PG (ref 27–31)
MCHC RBC AUTO-ENTMCNC: 33.3 G/DL (ref 32–36)
MCV RBC AUTO: 95 FL (ref 82–98)
MONOCYTES # BLD AUTO: 0.1 K/UL (ref 0.3–1)
MONOCYTES NFR BLD: 2.2 % (ref 4–15)
NEUTROPHILS # BLD AUTO: 4.8 K/UL (ref 1.8–7.7)
NEUTROPHILS NFR BLD: 82.8 % (ref 38–73)
NRBC BLD-RTO: 0 /100 WBC
PLATELET # BLD AUTO: 193 K/UL (ref 150–350)
PMV BLD AUTO: 9.9 FL (ref 9.2–12.9)
POTASSIUM SERPL-SCNC: 3.8 MMOL/L (ref 3.5–5.1)
PROT SERPL-MCNC: 6.8 G/DL (ref 6–8.4)
RBC # BLD AUTO: 4.05 M/UL (ref 4.6–6.2)
SODIUM SERPL-SCNC: 136 MMOL/L (ref 136–145)
WBC # BLD AUTO: 5.81 K/UL (ref 3.9–12.7)

## 2020-07-28 PROCEDURE — 20936 SP BONE AGRFT LOCAL ADD-ON: CPT | Mod: ,,, | Performed by: ORTHOPAEDIC SURGERY

## 2020-07-28 PROCEDURE — 36000711: Performed by: ORTHOPAEDIC SURGERY

## 2020-07-28 PROCEDURE — 22614 PR ARTHRODESIS, POST/POSTLAT, SNGL INTERSPACE, EA ADDTL: ICD-10-PCS | Mod: GC,,, | Performed by: ORTHOPAEDIC SURGERY

## 2020-07-28 PROCEDURE — D9220A PRA ANESTHESIA: ICD-10-PCS | Mod: ANES,,, | Performed by: ANESTHESIOLOGY

## 2020-07-28 PROCEDURE — 80053 COMPREHEN METABOLIC PANEL: CPT

## 2020-07-28 PROCEDURE — 37000009 HC ANESTHESIA EA ADD 15 MINS: Performed by: ORTHOPAEDIC SURGERY

## 2020-07-28 PROCEDURE — 63015 PR LAMINECTOMY,>2 SGMT,CERVICAL: ICD-10-PCS | Mod: GC,,, | Performed by: ORTHOPAEDIC SURGERY

## 2020-07-28 PROCEDURE — 25000003 PHARM REV CODE 250: Performed by: NURSE ANESTHETIST, CERTIFIED REGISTERED

## 2020-07-28 PROCEDURE — 63600175 PHARM REV CODE 636 W HCPCS: Performed by: STUDENT IN AN ORGANIZED HEALTH CARE EDUCATION/TRAINING PROGRAM

## 2020-07-28 PROCEDURE — D9220A PRA ANESTHESIA: ICD-10-PCS | Mod: CRNA,,, | Performed by: NURSE ANESTHETIST, CERTIFIED REGISTERED

## 2020-07-28 PROCEDURE — 71000015 HC POSTOP RECOV 1ST HR: Performed by: ORTHOPAEDIC SURGERY

## 2020-07-28 PROCEDURE — 20936 PR AUTOGRAFT SPINE SURGERY LOCAL FROM SAME INCISION: ICD-10-PCS | Mod: ,,, | Performed by: ORTHOPAEDIC SURGERY

## 2020-07-28 PROCEDURE — 22600 ARTHRD PST TQ 1NTRSPC CRV: CPT | Mod: 51,GC,, | Performed by: ORTHOPAEDIC SURGERY

## 2020-07-28 PROCEDURE — 85025 COMPLETE CBC W/AUTO DIFF WBC: CPT

## 2020-07-28 PROCEDURE — 71000033 HC RECOVERY, INTIAL HOUR: Performed by: ORTHOPAEDIC SURGERY

## 2020-07-28 PROCEDURE — 25000003 PHARM REV CODE 250: Performed by: STUDENT IN AN ORGANIZED HEALTH CARE EDUCATION/TRAINING PROGRAM

## 2020-07-28 PROCEDURE — 22600 PR ARTHRODESIS, POST/POSTLAT, SNGL INTERSPACE, CERVICAL BELOW C2: ICD-10-PCS | Mod: 51,GC,, | Performed by: ORTHOPAEDIC SURGERY

## 2020-07-28 PROCEDURE — 36000710: Performed by: ORTHOPAEDIC SURGERY

## 2020-07-28 PROCEDURE — 11000001 HC ACUTE MED/SURG PRIVATE ROOM

## 2020-07-28 PROCEDURE — 25000003 PHARM REV CODE 250: Performed by: ORTHOPAEDIC SURGERY

## 2020-07-28 PROCEDURE — 71000016 HC POSTOP RECOV ADDL HR: Performed by: ORTHOPAEDIC SURGERY

## 2020-07-28 PROCEDURE — 37000008 HC ANESTHESIA 1ST 15 MINUTES: Performed by: ORTHOPAEDIC SURGERY

## 2020-07-28 PROCEDURE — 22842 PR POSTERIOR SEGMENTAL INSTRUMENTATION 3-6 VRT SEG: ICD-10-PCS | Mod: GC,,, | Performed by: ORTHOPAEDIC SURGERY

## 2020-07-28 PROCEDURE — 63600175 PHARM REV CODE 636 W HCPCS: Performed by: NURSE ANESTHETIST, CERTIFIED REGISTERED

## 2020-07-28 PROCEDURE — D9220A PRA ANESTHESIA: Mod: CRNA,,, | Performed by: NURSE ANESTHETIST, CERTIFIED REGISTERED

## 2020-07-28 PROCEDURE — D9220A PRA ANESTHESIA: Mod: ANES,,, | Performed by: ANESTHESIOLOGY

## 2020-07-28 PROCEDURE — 25000242 PHARM REV CODE 250 ALT 637 W/ HCPCS: Performed by: NURSE ANESTHETIST, CERTIFIED REGISTERED

## 2020-07-28 PROCEDURE — 63600175 PHARM REV CODE 636 W HCPCS: Performed by: ORTHOPAEDIC SURGERY

## 2020-07-28 PROCEDURE — 27201423 OPTIME MED/SURG SUP & DEVICES STERILE SUPPLY: Performed by: ORTHOPAEDIC SURGERY

## 2020-07-28 PROCEDURE — C1713 ANCHOR/SCREW BN/BN,TIS/BN: HCPCS | Performed by: ORTHOPAEDIC SURGERY

## 2020-07-28 PROCEDURE — 22842 INSERT SPINE FIXATION DEVICE: CPT | Mod: GC,,, | Performed by: ORTHOPAEDIC SURGERY

## 2020-07-28 PROCEDURE — 94761 N-INVAS EAR/PLS OXIMETRY MLT: CPT

## 2020-07-28 PROCEDURE — 63600175 PHARM REV CODE 636 W HCPCS

## 2020-07-28 PROCEDURE — C1729 CATH, DRAINAGE: HCPCS | Performed by: ORTHOPAEDIC SURGERY

## 2020-07-28 PROCEDURE — 27201037 HC PRESSURE MONITORING SET UP

## 2020-07-28 PROCEDURE — 22614 ARTHRD PST TQ 1NTRSPC EA ADD: CPT | Mod: GC,,, | Performed by: ORTHOPAEDIC SURGERY

## 2020-07-28 PROCEDURE — 63015 REMOVE SPINE LAMINA >2 CRVCL: CPT | Mod: GC,,, | Performed by: ORTHOPAEDIC SURGERY

## 2020-07-28 PROCEDURE — 63600175 PHARM REV CODE 636 W HCPCS: Performed by: ANESTHESIOLOGY

## 2020-07-28 DEVICE — ROD SPINAL OCT 3.5 X 60MM: Type: IMPLANTABLE DEVICE | Site: SPINE CERVICAL | Status: FUNCTIONAL

## 2020-07-28 RX ORDER — OXYCODONE HYDROCHLORIDE 10 MG/1
10 TABLET ORAL EVERY 4 HOURS PRN
Status: DISCONTINUED | OUTPATIENT
Start: 2020-07-28 | End: 2020-08-02 | Stop reason: HOSPADM

## 2020-07-28 RX ORDER — TAMSULOSIN HYDROCHLORIDE 0.4 MG/1
1 CAPSULE ORAL DAILY
Status: DISCONTINUED | OUTPATIENT
Start: 2020-07-29 | End: 2020-08-02 | Stop reason: HOSPADM

## 2020-07-28 RX ORDER — FINASTERIDE 5 MG/1
5 TABLET, FILM COATED ORAL DAILY
Status: DISCONTINUED | OUTPATIENT
Start: 2020-07-29 | End: 2020-08-02 | Stop reason: HOSPADM

## 2020-07-28 RX ORDER — SODIUM CHLORIDE 0.9 % (FLUSH) 0.9 %
5 SYRINGE (ML) INJECTION
Status: DISCONTINUED | OUTPATIENT
Start: 2020-07-28 | End: 2020-08-02 | Stop reason: HOSPADM

## 2020-07-28 RX ORDER — PROPOFOL 10 MG/ML
VIAL (ML) INTRAVENOUS
Status: DISCONTINUED | OUTPATIENT
Start: 2020-07-28 | End: 2020-07-28

## 2020-07-28 RX ORDER — CEFAZOLIN SODIUM 1 G/3ML
2 INJECTION, POWDER, FOR SOLUTION INTRAMUSCULAR; INTRAVENOUS
Status: COMPLETED | OUTPATIENT
Start: 2020-07-28 | End: 2020-07-28

## 2020-07-28 RX ORDER — SODIUM CHLORIDE 9 MG/ML
INJECTION, SOLUTION INTRAVENOUS CONTINUOUS PRN
Status: DISCONTINUED | OUTPATIENT
Start: 2020-07-28 | End: 2020-07-28

## 2020-07-28 RX ORDER — FENTANYL CITRATE 50 UG/ML
INJECTION, SOLUTION INTRAMUSCULAR; INTRAVENOUS
Status: DISCONTINUED | OUTPATIENT
Start: 2020-07-28 | End: 2020-07-28

## 2020-07-28 RX ORDER — SODIUM CHLORIDE 9 MG/ML
75 INJECTION, SOLUTION INTRAVENOUS CONTINUOUS
Status: DISCONTINUED | OUTPATIENT
Start: 2020-07-28 | End: 2020-07-29

## 2020-07-28 RX ORDER — METOCLOPRAMIDE HYDROCHLORIDE 5 MG/ML
10 INJECTION INTRAMUSCULAR; INTRAVENOUS EVERY 10 MIN PRN
Status: DISCONTINUED | OUTPATIENT
Start: 2020-07-28 | End: 2020-07-28 | Stop reason: HOSPADM

## 2020-07-28 RX ORDER — ONDANSETRON 2 MG/ML
4 INJECTION INTRAMUSCULAR; INTRAVENOUS EVERY 12 HOURS PRN
Status: DISCONTINUED | OUTPATIENT
Start: 2020-07-28 | End: 2020-08-02 | Stop reason: HOSPADM

## 2020-07-28 RX ORDER — LABETALOL HYDROCHLORIDE 5 MG/ML
INJECTION, SOLUTION INTRAVENOUS
Status: DISCONTINUED | OUTPATIENT
Start: 2020-07-28 | End: 2020-07-28

## 2020-07-28 RX ORDER — HYDROCHLOROTHIAZIDE 25 MG/1
25 TABLET ORAL DAILY
Status: DISCONTINUED | OUTPATIENT
Start: 2020-07-29 | End: 2020-08-02 | Stop reason: HOSPADM

## 2020-07-28 RX ORDER — GLYCOPYRROLATE 0.2 MG/ML
INJECTION INTRAMUSCULAR; INTRAVENOUS
Status: DISCONTINUED | OUTPATIENT
Start: 2020-07-28 | End: 2020-07-28

## 2020-07-28 RX ORDER — BACITRACIN 50000 [IU]/1
INJECTION, POWDER, FOR SOLUTION INTRAMUSCULAR
Status: DISCONTINUED | OUTPATIENT
Start: 2020-07-28 | End: 2020-07-28 | Stop reason: HOSPADM

## 2020-07-28 RX ORDER — HYDROMORPHONE HYDROCHLORIDE 1 MG/ML
INJECTION, SOLUTION INTRAMUSCULAR; INTRAVENOUS; SUBCUTANEOUS
Status: COMPLETED
Start: 2020-07-28 | End: 2020-07-28

## 2020-07-28 RX ORDER — LIDOCAINE HYDROCHLORIDE AND EPINEPHRINE 10; 10 MG/ML; UG/ML
INJECTION, SOLUTION INFILTRATION; PERINEURAL
Status: DISCONTINUED | OUTPATIENT
Start: 2020-07-28 | End: 2020-07-28 | Stop reason: HOSPADM

## 2020-07-28 RX ORDER — OXYCODONE HYDROCHLORIDE 5 MG/1
5 TABLET ORAL EVERY 4 HOURS PRN
Status: DISCONTINUED | OUTPATIENT
Start: 2020-07-28 | End: 2020-08-02 | Stop reason: HOSPADM

## 2020-07-28 RX ORDER — PREGABALIN 75 MG/1
75 CAPSULE ORAL NIGHTLY
Status: DISCONTINUED | OUTPATIENT
Start: 2020-07-28 | End: 2020-07-30

## 2020-07-28 RX ORDER — METHOCARBAMOL 750 MG/1
750 TABLET, FILM COATED ORAL 3 TIMES DAILY PRN
Qty: 30 TABLET | Refills: 0 | Status: SHIPPED | OUTPATIENT
Start: 2020-07-28 | End: 2020-08-12

## 2020-07-28 RX ORDER — PROPOFOL 10 MG/ML
VIAL (ML) INTRAVENOUS CONTINUOUS PRN
Status: DISCONTINUED | OUTPATIENT
Start: 2020-07-28 | End: 2020-07-28

## 2020-07-28 RX ORDER — METHOCARBAMOL 750 MG/1
750 TABLET, FILM COATED ORAL 3 TIMES DAILY PRN
Status: DISCONTINUED | OUTPATIENT
Start: 2020-07-28 | End: 2020-07-31

## 2020-07-28 RX ORDER — FAMOTIDINE 20 MG/1
20 TABLET, FILM COATED ORAL 2 TIMES DAILY
Status: DISCONTINUED | OUTPATIENT
Start: 2020-07-28 | End: 2020-08-02 | Stop reason: HOSPADM

## 2020-07-28 RX ORDER — ONDANSETRON 8 MG/1
8 TABLET, ORALLY DISINTEGRATING ORAL EVERY 8 HOURS PRN
Status: DISCONTINUED | OUTPATIENT
Start: 2020-07-28 | End: 2020-08-02 | Stop reason: HOSPADM

## 2020-07-28 RX ORDER — HYDROMORPHONE HYDROCHLORIDE 1 MG/ML
0.2 INJECTION, SOLUTION INTRAMUSCULAR; INTRAVENOUS; SUBCUTANEOUS EVERY 5 MIN PRN
Status: COMPLETED | OUTPATIENT
Start: 2020-07-28 | End: 2020-07-28

## 2020-07-28 RX ORDER — SODIUM CHLORIDE 9 MG/ML
INJECTION, SOLUTION INTRAVENOUS CONTINUOUS
Status: DISCONTINUED | OUTPATIENT
Start: 2020-07-28 | End: 2020-08-01

## 2020-07-28 RX ORDER — LIDOCAINE HYDROCHLORIDE 20 MG/ML
INJECTION INTRAVENOUS
Status: DISCONTINUED | OUTPATIENT
Start: 2020-07-28 | End: 2020-07-28

## 2020-07-28 RX ORDER — ONDANSETRON 2 MG/ML
INJECTION INTRAMUSCULAR; INTRAVENOUS
Status: DISCONTINUED | OUTPATIENT
Start: 2020-07-28 | End: 2020-07-28

## 2020-07-28 RX ORDER — ESMOLOL HYDROCHLORIDE 10 MG/ML
INJECTION INTRAVENOUS
Status: DISCONTINUED | OUTPATIENT
Start: 2020-07-28 | End: 2020-07-28

## 2020-07-28 RX ORDER — OXYCODONE HYDROCHLORIDE 10 MG/1
10 TABLET ORAL EVERY 4 HOURS PRN
Qty: 54 TABLET | Refills: 0 | Status: SHIPPED | OUTPATIENT
Start: 2020-07-28 | End: 2021-05-03

## 2020-07-28 RX ORDER — DEXAMETHASONE SODIUM PHOSPHATE 4 MG/ML
INJECTION, SOLUTION INTRA-ARTICULAR; INTRALESIONAL; INTRAMUSCULAR; INTRAVENOUS; SOFT TISSUE
Status: DISCONTINUED | OUTPATIENT
Start: 2020-07-28 | End: 2020-07-28

## 2020-07-28 RX ORDER — SUCCINYLCHOLINE CHLORIDE 20 MG/ML
INJECTION INTRAMUSCULAR; INTRAVENOUS
Status: DISCONTINUED | OUTPATIENT
Start: 2020-07-28 | End: 2020-07-28

## 2020-07-28 RX ORDER — ACETAMINOPHEN 10 MG/ML
INJECTION, SOLUTION INTRAVENOUS
Status: DISCONTINUED | OUTPATIENT
Start: 2020-07-28 | End: 2020-07-28

## 2020-07-28 RX ORDER — CEFAZOLIN SODIUM 1 G/3ML
2 INJECTION, POWDER, FOR SOLUTION INTRAMUSCULAR; INTRAVENOUS
Status: COMPLETED | OUTPATIENT
Start: 2020-07-28 | End: 2020-07-29

## 2020-07-28 RX ORDER — TELMISARTAN 20 MG/1
80 TABLET ORAL DAILY
Status: DISCONTINUED | OUTPATIENT
Start: 2020-07-29 | End: 2020-08-02 | Stop reason: HOSPADM

## 2020-07-28 RX ORDER — KETAMINE HCL IN 0.9 % NACL 50 MG/5 ML
SYRINGE (ML) INTRAVENOUS
Status: DISCONTINUED | OUTPATIENT
Start: 2020-07-28 | End: 2020-07-28

## 2020-07-28 RX ORDER — ROPIVACAINE/EPI/CLONIDINE/KET 2.46-0.005
SYRINGE (ML) INJECTION ONCE
Status: DISCONTINUED | OUTPATIENT
Start: 2020-07-28 | End: 2020-07-28 | Stop reason: HOSPADM

## 2020-07-28 RX ORDER — DIPHENHYDRAMINE HCL 25 MG
25 CAPSULE ORAL EVERY 6 HOURS PRN
Status: DISCONTINUED | OUTPATIENT
Start: 2020-07-28 | End: 2020-08-02 | Stop reason: HOSPADM

## 2020-07-28 RX ORDER — ONDANSETRON 2 MG/ML
4 INJECTION INTRAMUSCULAR; INTRAVENOUS ONCE AS NEEDED
Status: DISCONTINUED | OUTPATIENT
Start: 2020-07-28 | End: 2020-07-28 | Stop reason: HOSPADM

## 2020-07-28 RX ORDER — HYDRALAZINE HYDROCHLORIDE 20 MG/ML
5 INJECTION INTRAMUSCULAR; INTRAVENOUS ONCE
Status: COMPLETED | OUTPATIENT
Start: 2020-07-28 | End: 2020-07-28

## 2020-07-28 RX ORDER — ROSUVASTATIN CALCIUM 10 MG/1
20 TABLET, COATED ORAL NIGHTLY
Status: DISCONTINUED | OUTPATIENT
Start: 2020-07-28 | End: 2020-08-02 | Stop reason: HOSPADM

## 2020-07-28 RX ORDER — TELMISARTAN AND HYDROCHLORTHIAZIDE 80; 25 MG/1; MG/1
1 TABLET ORAL DAILY
Status: DISCONTINUED | OUTPATIENT
Start: 2020-07-29 | End: 2020-07-28

## 2020-07-28 RX ORDER — VANCOMYCIN HYDROCHLORIDE 1 G/20ML
INJECTION, POWDER, LYOPHILIZED, FOR SOLUTION INTRAVENOUS
Status: DISCONTINUED | OUTPATIENT
Start: 2020-07-28 | End: 2020-07-28 | Stop reason: HOSPADM

## 2020-07-28 RX ORDER — POLYETHYLENE GLYCOL 3350 17 G/17G
17 POWDER, FOR SOLUTION ORAL 2 TIMES DAILY PRN
Status: DISCONTINUED | OUTPATIENT
Start: 2020-07-28 | End: 2020-08-02 | Stop reason: HOSPADM

## 2020-07-28 RX ORDER — ROCURONIUM BROMIDE 10 MG/ML
INJECTION, SOLUTION INTRAVENOUS
Status: DISCONTINUED | OUTPATIENT
Start: 2020-07-28 | End: 2020-07-28

## 2020-07-28 RX ORDER — SODIUM CHLORIDE 0.9 % (FLUSH) 0.9 %
10 SYRINGE (ML) INJECTION
Status: ACTIVE | OUTPATIENT
Start: 2020-07-28

## 2020-07-28 RX ORDER — MIDAZOLAM HYDROCHLORIDE 1 MG/ML
INJECTION, SOLUTION INTRAMUSCULAR; INTRAVENOUS
Status: DISCONTINUED | OUTPATIENT
Start: 2020-07-28 | End: 2020-07-28

## 2020-07-28 RX ORDER — ALBUTEROL SULFATE 90 UG/1
AEROSOL, METERED RESPIRATORY (INHALATION)
Status: DISCONTINUED | OUTPATIENT
Start: 2020-07-28 | End: 2020-07-28

## 2020-07-28 RX ORDER — MORPHINE SULFATE 2 MG/ML
3 INJECTION, SOLUTION INTRAMUSCULAR; INTRAVENOUS EVERY 4 HOURS PRN
Status: DISCONTINUED | OUTPATIENT
Start: 2020-07-28 | End: 2020-08-02 | Stop reason: HOSPADM

## 2020-07-28 RX ADMIN — Medication 10 MG: at 02:07

## 2020-07-28 RX ADMIN — LABETALOL HYDROCHLORIDE 5 MG: 5 INJECTION, SOLUTION INTRAVENOUS at 04:07

## 2020-07-28 RX ADMIN — ROSUVASTATIN CALCIUM 20 MG: 10 TABLET, FILM COATED ORAL at 09:07

## 2020-07-28 RX ADMIN — ESMOLOL HYDROCHLORIDE 20 MG: 100 INJECTION, SOLUTION INTRAVENOUS at 03:07

## 2020-07-28 RX ADMIN — REMIFENTANIL HYDROCHLORIDE 0.2 MCG/KG/MIN: 1 INJECTION, POWDER, LYOPHILIZED, FOR SOLUTION INTRAVENOUS at 01:07

## 2020-07-28 RX ADMIN — HYDROMORPHONE HYDROCHLORIDE 0.2 MG: 1 INJECTION, SOLUTION INTRAMUSCULAR; INTRAVENOUS; SUBCUTANEOUS at 05:07

## 2020-07-28 RX ADMIN — HYDROMORPHONE HYDROCHLORIDE 0.2 MG: 1 INJECTION, SOLUTION INTRAMUSCULAR; INTRAVENOUS; SUBCUTANEOUS at 04:07

## 2020-07-28 RX ADMIN — FENTANYL CITRATE 100 MCG: 50 INJECTION, SOLUTION INTRAMUSCULAR; INTRAVENOUS at 01:07

## 2020-07-28 RX ADMIN — ROCURONIUM BROMIDE 10 MG: 10 INJECTION, SOLUTION INTRAVENOUS at 01:07

## 2020-07-28 RX ADMIN — PROPOFOL 20 MG: 10 INJECTION, EMULSION INTRAVENOUS at 02:07

## 2020-07-28 RX ADMIN — CEFAZOLIN 2 G: 1 INJECTION, POWDER, FOR SOLUTION INTRAMUSCULAR; INTRAVENOUS at 09:07

## 2020-07-28 RX ADMIN — SUCCINYLCHOLINE CHLORIDE 160 MG: 20 INJECTION, SOLUTION INTRAMUSCULAR; INTRAVENOUS at 01:07

## 2020-07-28 RX ADMIN — LIDOCAINE HYDROCHLORIDE 100 MG: 20 INJECTION, SOLUTION INTRAVENOUS at 01:07

## 2020-07-28 RX ADMIN — OXYCODONE HYDROCHLORIDE 10 MG: 10 TABLET ORAL at 09:07

## 2020-07-28 RX ADMIN — PROPOFOL 30 MG: 10 INJECTION, EMULSION INTRAVENOUS at 03:07

## 2020-07-28 RX ADMIN — LABETALOL HYDROCHLORIDE 10 MG: 5 INJECTION, SOLUTION INTRAVENOUS at 04:07

## 2020-07-28 RX ADMIN — MIDAZOLAM HYDROCHLORIDE 2 MG: 1 INJECTION, SOLUTION INTRAMUSCULAR; INTRAVENOUS at 01:07

## 2020-07-28 RX ADMIN — PREGABALIN 75 MG: 75 CAPSULE ORAL at 09:07

## 2020-07-28 RX ADMIN — PROPOFOL 200 MG: 10 INJECTION, EMULSION INTRAVENOUS at 01:07

## 2020-07-28 RX ADMIN — SUGAMMADEX 490 MG: 100 INJECTION, SOLUTION INTRAVENOUS at 02:07

## 2020-07-28 RX ADMIN — SODIUM CHLORIDE 0.25 MCG/KG/MIN: 9 INJECTION, SOLUTION INTRAVENOUS at 01:07

## 2020-07-28 RX ADMIN — GLYCOPYRROLATE 0.2 MG: 0.2 INJECTION, SOLUTION INTRAMUSCULAR; INTRAVENOUS at 01:07

## 2020-07-28 RX ADMIN — METHOCARBAMOL TABLETS 750 MG: 750 TABLET, COATED ORAL at 04:07

## 2020-07-28 RX ADMIN — ROCURONIUM BROMIDE 20 MG: 10 INJECTION, SOLUTION INTRAVENOUS at 02:07

## 2020-07-28 RX ADMIN — SODIUM CHLORIDE 75 ML/HR: 0.9 INJECTION, SOLUTION INTRAVENOUS at 04:07

## 2020-07-28 RX ADMIN — ESMOLOL HYDROCHLORIDE 30 MG: 100 INJECTION, SOLUTION INTRAVENOUS at 03:07

## 2020-07-28 RX ADMIN — ACETAMINOPHEN 1000 MG: 10 INJECTION, SOLUTION INTRAVENOUS at 03:07

## 2020-07-28 RX ADMIN — OXYCODONE HYDROCHLORIDE 15 MG: 10 TABLET ORAL at 04:07

## 2020-07-28 RX ADMIN — LABETALOL HYDROCHLORIDE 5 MG: 5 INJECTION, SOLUTION INTRAVENOUS at 03:07

## 2020-07-28 RX ADMIN — SODIUM CHLORIDE: 0.9 INJECTION, SOLUTION INTRAVENOUS at 12:07

## 2020-07-28 RX ADMIN — PROPOFOL: 10 INJECTION, EMULSION INTRAVENOUS at 02:07

## 2020-07-28 RX ADMIN — FAMOTIDINE 20 MG: 20 TABLET, FILM COATED ORAL at 09:07

## 2020-07-28 RX ADMIN — METHOCARBAMOL TABLETS 750 MG: 750 TABLET, COATED ORAL at 09:07

## 2020-07-28 RX ADMIN — ALBUTEROL SULFATE 4 PUFF: 90 AEROSOL, METERED RESPIRATORY (INHALATION) at 01:07

## 2020-07-28 RX ADMIN — PROPOFOL 40 MG: 10 INJECTION, EMULSION INTRAVENOUS at 03:07

## 2020-07-28 RX ADMIN — DEXAMETHASONE SODIUM PHOSPHATE 8 MG: 4 INJECTION, SOLUTION INTRAMUSCULAR; INTRAVENOUS at 01:07

## 2020-07-28 RX ADMIN — DOCUSATE SODIUM 50 MG: 50 CAPSULE, LIQUID FILLED ORAL at 09:07

## 2020-07-28 RX ADMIN — PROPOFOL: 10 INJECTION, EMULSION INTRAVENOUS at 01:07

## 2020-07-28 RX ADMIN — HYDRALAZINE HYDROCHLORIDE 5 MG: 20 INJECTION INTRAMUSCULAR; INTRAVENOUS at 05:07

## 2020-07-28 RX ADMIN — PROPOFOL 150 MCG/KG/MIN: 10 INJECTION, EMULSION INTRAVENOUS at 01:07

## 2020-07-28 RX ADMIN — ONDANSETRON 4 MG: 2 INJECTION, SOLUTION INTRAMUSCULAR; INTRAVENOUS at 03:07

## 2020-07-28 RX ADMIN — SODIUM CHLORIDE, SODIUM GLUCONATE, SODIUM ACETATE, POTASSIUM CHLORIDE, MAGNESIUM CHLORIDE, SODIUM PHOSPHATE, DIBASIC, AND POTASSIUM PHOSPHATE: .53; .5; .37; .037; .03; .012; .00082 INJECTION, SOLUTION INTRAVENOUS at 01:07

## 2020-07-28 RX ADMIN — Medication 20 MG: at 01:07

## 2020-07-28 RX ADMIN — CEFAZOLIN 2 G: 330 INJECTION, POWDER, FOR SOLUTION INTRAMUSCULAR; INTRAVENOUS at 01:07

## 2020-07-28 NOTE — ANESTHESIA PROCEDURE NOTES
Intubation  Performed by: Nishi Ruano CRNA  Authorized by: Carl Chavarria MD     Intubation:     Induction:  Intravenous    Intubated:  Postinduction    Mask Ventilation:  Easy with oral airway    Attempts:  1    Attempted By:  Student    Method of Intubation:  Video laryngoscopy    Blade:  Langley 3    Laryngeal View Grade: Grade I - full view of chords      Difficult Airway Encountered?: No      Complications:  None    Airway Device:  Oral endotracheal tube    Airway Device Size:  8.0    Style/Cuff Inflation:  Cuffed (inflated to minimal occlusive pressure)    Tube secured:  23    Secured at:  The lips    Placement Verified By:  Capnometry    Complicating Factors:  None    Findings Post-Intubation:  BS equal bilateral

## 2020-07-28 NOTE — DISCHARGE INSTRUCTIONS
DR. LOU CORONA'S POSTOPERATIVE INSTRUCTIONS -   POSTERIOR CERVICAL LAMINECTOMY AND FUSION       Antibiotics: You do not need additional antibiotics at home.    NSAIDs: Please refrain from taking ibuprofen (Advil), naproxen (Aleve), and other non steroidal anti-inflammatory medications other than the Celebrex that will be prescribed to you after surgery.    Wound Care: You may remove your dressing and shower 7 days after surgery. Until then please keep your wound clean and dry. Sponge baths are acceptable. Do not go in a pool or hot tub until seen in clinic. Please leave the small steri-strips covering your wound in place until they fall off naturally (2 weeks). You may notice clear suture ends hanging from the sides of your incision after the steri-strips are removed, it is ok to clip these with scissors.    Cervical Collar: If given a collar after surgery you should wear it at all times. The only times it may be removed are for sleeping, eating and showering.    Pain: We will use a multimodal approach for pain management after your surgery.  You will be given a prescription for pain medicine when you are discharged from the hospital.  You will also be given prescriptions for Robaxin (a muscle relaxer), Gabapentin, Celebrex and Tylenol.  Please note: you will only be given ONE prescription for narcotics when you are discharged from the hospital.  This medication will not be refilled.  The other medications given to you may be refilled if needed.      Difficulty Breathing: This is uncommon after surgery and may represent dangerous swelling in your neck. If you experience difficulty breathing please call 911 immediately.    Infection: Signs of infection include increasing wound drainage and redness around the wound, as well as a temperature over 101.5 degrees. It is unnecessary to take your temperature on a routine basis. Please call the above number if you are concerned about an infection.    Driving and Work: It  is ok to return to driving and work as long as you are not taking narcotic pain medications or wearing your cervical collar. Please do not lift over 5 pounds or participate in exercise or sports until cleared by Dr. Dang.    Deep Venous Thrombosis (Blood Clots): Symptoms include swelling in the legs and shortness of breath. Please call the office or proceed to the nearest emergency room if you have any of these symptoms.    Physical Therapy: The best physical therapy immediately after surgery is walking. Please try to walk as much as possible.    Follow-up: You will be scheduled for a follow-up appointment in 4 weeks with either Dr. Dang or his physician assistant, Nova Mckeon PA-C.  The clinic will call you to schedule this appointment.    Questions: During business hours please call [phone] for routine questions. For after hours questions please call [phone] and ask to speak with the Orthopaedic resident on call.    Disability: If you submitted short term disability paperwork for us to complete and would like to check the status, please call the Disability Department at [phone].  You may fax any necessary paperwork to [phone].

## 2020-07-28 NOTE — H&P
DATE: 7/27/2020  PATIENT: Elmer Borrero    Attending Physician: Abraham Dang M.D.    CHIEF COMPLAINT: neck pain     HISTORY:  Elmer Borrero is a 64 y.o. male with PMH of left heart catheterization in November 2019, s/p knee replacement by Dr. Schultz 9/5/2019 here for initial evaluation of low back and neck pain (Back - 0, Neck - 6). He also reports a history of unknown lumbar spine surgery in 2015 in [place].  He does report occasional left arm pain. The pain has been present since he was discharged from the hospital in November. The patient describes the pain as aching and intermittent.  The pain started in the low back.  He thought he had a kidney stone and went to the hospital.  Kidney stones were ruled out.  He started applying heat and the pain improved.  Then his neck started bothering him.  His back still bothers him intermittently.   The pain is worse with activity and improved by heat. There is no associated numbness and tingling. There is subjective weakness.  He reports decreased  strength and difficulty with buttons.  Prior treatments have included tylenol, but no PT, ESIs or recent surgery.  He did have lumbar ESIs prior to his surgery in 2015.          The patient denies myelopathic symptoms such as handwriting changes.  He reports difficulty with buttons/coins/keys. Denies perineal paresthesias, bowel/bladder dysfunction.     PAST MEDICAL/SURGICAL HISTORY:  Past Medical History:   Diagnosis Date    Actinic keratosis     Arthritis     BPH (benign prostatic hyperplasia)     Cataract     Coronary artery disease     Fatty liver     Hyperlipidemia     Hypertension     Kidney stone     Morbid obesity with BMI of 40.0-44.9, adult 10/2/2019    Obesity (BMI 30-39.9) 10/2/2019    GEOVANNA on CPAP     Renal calculi     Subclinical hypothyroidism      Past Surgical History:   Procedure Laterality Date    JOINT REPLACEMENT      left knee    KNEE SURGERY      LEFT HEART  CATHETERIZATION Left 11/27/2019    Procedure: Left heart cath;  Surgeon: Dejan Nielsen MD;  Location: [facility] CATH;  Service: Cardiovascular;  Laterality: Left;    PERCUTANEOUS CRYOTHERAPY OF PERIPHERAL NERVE USING LIQUID NITROUS OXIDE IN CLOSED NEEDLE DEVICE Right 8/26/2019    Procedure: CRYOTHERAPY, NERVE, PERIPHERAL, PERCUTANEOUS, USING LIQUID NITROUS OXIDE IN CLOSED NEEDLE DEVICE;  Surgeon: CHYNA Quevedo;  Location: [facility] CATH LAB;  Service: Pain Management;  Laterality: Right;  Right Knee IOVERA    right knee replacement       SPINE SURGERY      around 2010     TOTAL KNEE ARTHROPLASTY Right 9/5/2019    Procedure: ARTHROPLASTY, KNEE, TOTAL-VINCENZO-SAME DAY;  Surgeon: Arturo Schultz MD;  Location: [facility] OR [address] [place] [address];  Service: Orthopedics;  Laterality: Right;       Current Medications: No current facility-administered medications for this encounter.     Current Outpatient Medications:     allopurinol (ZYLOPRIM) 300 MG tablet, Take 1 tablet (300 mg total) by mouth once daily. (Patient taking differently: Take 300 mg by mouth once daily. Take if needed), Disp: 90 tablet, Rfl: 3    aspirin (ECOTRIN) 81 MG EC tablet, TAKE 1 TABLET BY MOUTH EVERY DAY, Disp: 32 tablet, Rfl: 0    b complex vitamins tablet, Take 1 tablet by mouth once daily. Hold for 1 week prior to surgery, Disp: , Rfl:     cyclobenzaprine (FLEXERIL) 5 MG tablet, Take 1 tablet by mouth nightly. Take if needed, Disp: , Rfl:     finasteride (PROSCAR) 5 mg tablet, TAKE 1 TABLET BY MOUTH EVERY DAY (Patient taking differently: Take in am of surgery), Disp: 90 tablet, Rfl: 1    loratadine (CLARITIN) 10 mg tablet, Take 1 tablet by mouth daily as needed. Take if needed, Disp: , Rfl:     metoprolol succinate (TOPROL-XL) 25 MG 24 hr tablet, Take 0.5 tablets (12.5 mg total) by mouth once daily. (Patient taking differently: Take 12.5 mg by mouth once daily. Take in am of surgery), Disp: 15 tablet, Rfl: 11    montelukast (SINGULAIR) 10 mg  tablet, TAKE 1 TABLET EVERY EVENING (Patient taking differently: Takes at night), Disp: 90 tablet, Rfl: 1    multivitamin capsule, Take 1 capsule by mouth once daily. Hold for 1 week prior to surgery, Disp: , Rfl:     omega-3 fatty acids/fish oil (FISH OIL-OMEGA-3 FATTY ACIDS) 300-1,000 mg capsule, Take by mouth once daily. Hold for 1 week prior to surgery, Disp: , Rfl:     rosuvastatin (CRESTOR) 20 MG tablet, Take 1 tablet (20 mg total) by mouth every evening. (Patient taking differently: Take 20 mg by mouth every evening. Takes at night), Disp: 90 tablet, Rfl: 3    tamsulosin (FLOMAX) 0.4 mg Cap, Take 1 capsule (0.4 mg total) by mouth once daily. (Patient taking differently: Take 1 capsule by mouth once daily. Take in am of surgery), Disp: 90 capsule, Rfl: 3    telmisartan-hydrochlorothiazide (MICARDIS HCT) 80-25 mg per tablet, TAKE 1 TABLET BY MOUTH EVERY DAY (Patient taking differently: Hold am of surgery), Disp: 90 tablet, Rfl: 3    ticagrelor (BRILINTA) 90 mg tablet, Take 1 tablet (90 mg total) by mouth 2 (two) times daily. (Patient taking differently: Take 90 mg by mouth 2 (two) times daily. Stopped per Dr Nielsen, last dose was 7/18/20.), Disp: 60 tablet, Rfl: 11    Social History:   Social History     Socioeconomic History    Marital status:      Spouse name: Not on file    Number of children: 2    Years of education: Not on file    Highest education level: Not on file   Occupational History    Not on file   Social Needs    Financial resource strain: Not hard at all    Food insecurity     Worry: Never true     Inability: Never true    Transportation needs     Medical: No     Non-medical: No   Tobacco Use    Smoking status: Never Smoker    Smokeless tobacco: Never Used   Substance and Sexual Activity    Alcohol use: Not Currently     Frequency: 4 or more times a week     Drinks per session: 3 or 4     Binge frequency: Weekly     Comment: Previous heavy alcohol use (2-3 beers and 2-3  glasses of wine daily x 40 years). Stopped alcohol end of August 2019    Drug use: No    Sexual activity: Yes     Partners: Female   Lifestyle    Physical activity     Days per week: 0 days     Minutes per session: Not on file    Stress: Not at all   Relationships    Social connections     Talks on phone: More than three times a week     Gets together: Once a week     Attends Jew service: Not on file     Active member of club or organization: No     Attends meetings of clubs or organizations: Never     Relationship status:    Other Topics Concern    Not on file   Social History Narrative    Not on file       REVIEW OF SYSTEMS:  Constitution: Negative. Negative for chills, fever and night sweats.   Cardiovascular: Negative for chest pain and syncope.   Respiratory: Negative for cough and shortness of breath.   Gastrointestinal: See HPI. Negative for nausea/vomiting. Negative for abdominal pain.  Genitourinary: See HPI. Negative for discoloration or dysuria.  Skin: Negative for dry skin, itching and rash.   Hematologic/Lymphatic: negative2 for bleeding/clotting disorders.   Musculoskeletal: Negative for falls and muscle weakness.   Neurological: See HPI. no history of seizures. no history of cranial surgery or shunts.  Endocrine: Negative for polydipsia, polyphagia and polyuria.   Allergic/Immunologic: Negative for hives and persistent infections.  Psychiatric/Behavioral: Negative for depression and insomnia.         EXAM:  There were no vitals taken for this visit.    General: The patient is a  64 y.o. male in no apparent distress, the patient is orientatied to person, place and time.  Psych: Normal mood and affect  HEENT: Vision grossly intact, hearing intact to the spoken word.  Lungs: Respirations unlabored.  Gait: Normal station and gait, no difficulty with toe or heel walk.   Skin: Cervical skin negative for rashes, lesions, hairy patches and surgical scars.  Range of motion: Cervical range  of motion is acceptable. There is mild tenderness to palpation.  Spinal Balance: Global saggital and coronal spinal balance acceptable, no significant for scoliosis and kyphosis.  Musculoskeletal: No pain with the range of motion of the bilateral shoulders and elbows. Normal bulk and contour of the bilateral hands.  Vascular: Bilateral hands warm and well perfused, Radial pulses 2+ bilaterally.  Neurological: Normal strength and tone in all major motor groups in the bilateral upper and lower extremities. Normal sensation to light touch in the C5-T1 and L2-S1 dermatomes bilaterally.  Deep tendon reflexes symmetric 2+ in the bilateral upper and lower extremities.  Negative Inverted Radial Reflex and Robison's bilaterally. Negative Babinski bilaterally.     IMAGING:   Today I personally reviewed AP, Lat and Flex/Ex  upright C-spine films that demonstrate degenerative changes     There is no height or weight on file to calculate BMI.  Hemoglobin A1C   Date Value Ref Range Status   07/09/2019 5.3 4.0 - 5.6 % Final     Comment:     ADA Screening Guidelines:  5.7-6.4%  Consistent with prediabetes  >or=6.5%  Consistent with diabetes  High levels of fetal hemoglobin interfere with the HbA1C  assay. Heterozygous hemoglobin variants (HbS, HgC, etc)do  not significantly interfere with this assay.   However, presence of multiple variants may affect accuracy.     07/26/2018 5.4 4.0 - 5.6 % Final     Comment:     ADA Screening Guidelines:  5.7-6.4%  Consistent with prediabetes  >or=6.5%  Consistent with diabetes  High levels of fetal hemoglobin interfere with the HbA1C  assay. Heterozygous hemoglobin variants (HbS, HgC, etc)do  not significantly interfere with this assay.   However, presence of multiple variants may affect accuracy.     06/28/2017 5.4 4.0 - 5.6 % Final     Comment:     According to ADA guidelines, hemoglobin A1c <7.0% represents  optimal control in non-pregnant diabetic patients. Different  metrics may apply to  specific patient populations.   Standards of Medical Care in Diabetes-2016.  For the purpose of screening for the presence of diabetes:  <5.7%     Consistent with the absence of diabetes  5.7-6.4%  Consistent with increasing risk for diabetes   (prediabetes)  >or=6.5%  Consistent with diabetes  Currently, no consensus exists for use of hemoglobin A1c  for diagnosis of diabetes for children.  This Hemoglobin A1c assay has significant interference with fetal   hemoglobin   (HbF). The results are invalid for patients with abnormal amounts of   HbF,   including those with known Hereditary Persistence   of Fetal Hemoglobin. Heterozygous hemoglobin variants (HbAS, HbAC,   HbAD, HbAE, HbA2) do not significantly interfere with this assay;   however, presence of multiple variants in a sample may impact the %   interference.         ASSESSMENT/PLAN:  Cervical stenosis of spine      No follow-ups on file.  I had a sit down discussion with the patient and his wife regarding a C3-6 laminectomy and fusion     I spent 15 minutes with the patient of which greater than 1/2 the time was devoted to counciling the patient regarding treatment options.  . We specifically discussed the risks, benefits, and alternatives to surgery. We discussed the surgical procedure including the skin incision, nerve decompression, bone fusion, allograft and/or iliac crest bone graft, and surgical implants including lateral mass screws and pedicle screws as indicated: they understand the risks include but are not limited to death, paralysis, blindness, bleeding, infection, damage to arteries, veins and nerves, vertebral artery injury, dysphagia, spinal fluid leak, continued or worsening pain, no improvement in symptoms, non-union, and the possible need for more surgery in the future, as well as the possibility other unforseen and unknown complications. We talked about expected hospital stay and recovery period. All questions were answered; they understand  and wish to proceed

## 2020-07-28 NOTE — PROGRESS NOTES
Wife to bedside, updated with room number, pt remains stable, black duffel bag and black CPAP bag sent with pt.

## 2020-07-28 NOTE — PLAN OF CARE
[facility]    HOME HEALTH ORDERS  FACE TO FACE ENCOUNTER    Patient Name: Elmer Borrero  YOB: 1955    PCP: Dae Romo MD   PCP Address: [address] / [place] [address]  PCP Phone Number: [phone]  PCP Fax: [phone]    Encounter Date: 07/28/2020    Admit to Home Health    Diagnoses:  Active Hospital Problems    Diagnosis  POA    *Cervical stenosis of spine [M48.02]  Yes    CAD (coronary artery disease) [I25.10]  Yes    Hypertension [I10]  Yes    BPH (benign prostatic hyperplasia) [N40.0]  Yes      Resolved Hospital Problems   No resolved problems to display.       Future Appointments   Date Time Provider Department Center   12/16/2020 10:20 AM Dae Romo MD [facility]  [facility]           I have seen and examined this patient face to face today. My clinical findings that support the need for the home health skilled services and home bound status are the following:  Weakness/numbness causing balance and gait disturbance due to Surgery making it taxing to leave home.    Allergies:  Review of patient's allergies indicates:   Allergen Reactions    Ace inhibitors Other (See Comments)     cough       Diet: regular diet    Activities: activity as tolerated    Nursing:   SN to complete comprehensive assessment including routine vital signs. Instruct on disease process and s/s of complications to report to MD. Follow specific home health arthoplasty protocol. Review/verify medication list sent home with the patient at time of discharge  and instruct patient/caregiver as needed. If coumadin ordered, coumadin clinic to manage INR with INR draws 2x per week with a goal to maintain INR between 1.8 and 2.2. Frequency may be adjusted depending on start of care date.    Notify MD if SBP > 160 or < 90; DBP > 90 or < 50; HR > 120 or < 50; Temp > 101    Home Medical Equipment:  Walker, 3-1 bedside commode, transfer tub bench    CONSULTS:    Physical  Therapy to evaluate and treat. Evaluate for home safety and equipment needs; Establish/upgrade home exercise program. Perform / instruct on therapeutic exercises, gait training, transfer training, and Range of Motion.    OTHER:  Occupational Therapy to evaluate and treat. Evaluate home environment for safety and equipment needs. Perform/Instruct on transfers, ADL training, ROM, and therapeutic exercises.   to evaluate for community resources/long-range planning.  Aide to provide assistance with personal care, ADLs, and vital signs.        WOUND CARE ORDERS  Wound Care: You may remove your dressing and shower 7 days after surgery. Until then please keep your wound clean and dry. Sponge baths are acceptable. Do not go in a pool or hot tub until seen in clinic. Please leave the small steri-strips covering your wound in place until they fall off naturally (2 weeks). You may notice clear suture ends hanging from the sides of your incision after the steri-strips are removed, it is ok to clip these with scissors.    Cervical Collar: If given a collar after surgery you should wear it at all times. The only times it may be removed are for sleeping, eating and showering.        Medications: Review discharge medications with patient and family and provide education.      Current Discharge Medication List      START taking these medications    Details   methocarbamoL (ROBAXIN) 750 MG Tab Take 1 tablet (750 mg total) by mouth 3 (three) times daily as needed (muscle spasms).  Qty: 30 tablet, Refills: 0      oxyCODONE (ROXICODONE) 10 mg Tab immediate release tablet Take 1 tablet (10 mg total) by mouth every 4 (four) hours as needed for Pain.  Qty: 54 tablet, Refills: 0    Comments: Quantity prescribed more than 7 day supply? Yes, quantity medically necessary    Bedside delivery         CONTINUE these medications which have NOT CHANGED    Details   allopurinol (ZYLOPRIM) 300 MG tablet Take 1 tablet (300 mg total) by  mouth once daily.  Qty: 90 tablet, Refills: 3    Associated Diagnoses: Gout, unspecified cause, unspecified chronicity, unspecified site      aspirin (ECOTRIN) 81 MG EC tablet TAKE 1 TABLET BY MOUTH EVERY DAY  Qty: 32 tablet, Refills: 0      b complex vitamins tablet Take 1 tablet by mouth once daily. Hold for 1 week prior to surgery      finasteride (PROSCAR) 5 mg tablet TAKE 1 TABLET BY MOUTH EVERY DAY  Qty: 90 tablet, Refills: 1      loratadine (CLARITIN) 10 mg tablet Take 1 tablet by mouth daily as needed. Take if needed      metoprolol succinate (TOPROL-XL) 25 MG 24 hr tablet Take 0.5 tablets (12.5 mg total) by mouth once daily.  Qty: 15 tablet, Refills: 11      montelukast (SINGULAIR) 10 mg tablet TAKE 1 TABLET EVERY EVENING  Qty: 90 tablet, Refills: 1      multivitamin capsule Take 1 capsule by mouth once daily. Hold for 1 week prior to surgery      omega-3 fatty acids/fish oil (FISH OIL-OMEGA-3 FATTY ACIDS) 300-1,000 mg capsule Take by mouth once daily. Hold for 1 week prior to surgery      rosuvastatin (CRESTOR) 20 MG tablet Take 1 tablet (20 mg total) by mouth every evening.  Qty: 90 tablet, Refills: 3      tamsulosin (FLOMAX) 0.4 mg Cap Take 1 capsule (0.4 mg total) by mouth once daily.  Qty: 90 capsule, Refills: 3    Associated Diagnoses: Benign prostatic hyperplasia      telmisartan-hydrochlorothiazide (MICARDIS HCT) 80-25 mg per tablet TAKE 1 TABLET BY MOUTH EVERY DAY  Qty: 90 tablet, Refills: 3    Associated Diagnoses: Hypertension, unspecified type      ticagrelor (BRILINTA) 90 mg tablet Take 1 tablet (90 mg total) by mouth 2 (two) times daily.  Qty: 60 tablet, Refills: 11      cyclobenzaprine (FLEXERIL) 5 MG tablet Take 1 tablet by mouth nightly. Take if needed             I certify that this patient is confined to his home and needs intermittent skilled nursing care, physical therapy and occupational therapy.

## 2020-07-28 NOTE — PROGRESS NOTES
Report called to Arleen DIANE,pt made ready fro transport to [address] [place] [address] per PCT, pt resting quietly,VSS,resp unlabored, c/o posterior neck pain 7/10 when awoken from sleep, ASPEN Collar,dressing and drain remain intact, marcel po meds and fluids, stable at present.

## 2020-07-28 NOTE — ASSESSMENT & PLAN NOTE
Elmer Cadena Gissell is a 64 y.o. male with cervical stenosis s/p PCF C3-C6 (7/28/20)    Pain control: multimodal  PT/OT: WBAT BLE  DVT PPx: no chemical ppx, SCDs at all times when not ambulating  Abx: postop Ancef  Labs: Hg 12.1 WBC 9.9 Plt 217  Drain: drain with 60 cc outpt  Sargent: d/c this am  C collar at all times    Dispo:pending pain control and mobilization

## 2020-07-29 LAB
ANION GAP SERPL CALC-SCNC: 10 MMOL/L (ref 8–16)
BUN SERPL-MCNC: 15 MG/DL (ref 8–23)
CALCIUM SERPL-MCNC: 8.4 MG/DL (ref 8.7–10.5)
CHLORIDE SERPL-SCNC: 102 MMOL/L (ref 95–110)
CO2 SERPL-SCNC: 23 MMOL/L (ref 23–29)
CREAT SERPL-MCNC: 0.9 MG/DL (ref 0.5–1.4)
ERYTHROCYTE [DISTWIDTH] IN BLOOD BY AUTOMATED COUNT: 12.3 % (ref 11.5–14.5)
EST. GFR  (AFRICAN AMERICAN): >60 ML/MIN/1.73 M^2
EST. GFR  (NON AFRICAN AMERICAN): >60 ML/MIN/1.73 M^2
GLUCOSE SERPL-MCNC: 155 MG/DL (ref 70–110)
HCT VFR BLD AUTO: 35 % (ref 40–54)
HGB BLD-MCNC: 12.1 G/DL (ref 14–18)
MCH RBC QN AUTO: 31.3 PG (ref 27–31)
MCHC RBC AUTO-ENTMCNC: 34.6 G/DL (ref 32–36)
MCV RBC AUTO: 90 FL (ref 82–98)
PLATELET # BLD AUTO: 217 K/UL (ref 150–350)
PMV BLD AUTO: 10.4 FL (ref 9.2–12.9)
POTASSIUM SERPL-SCNC: 3.9 MMOL/L (ref 3.5–5.1)
RBC # BLD AUTO: 3.87 M/UL (ref 4.6–6.2)
SODIUM SERPL-SCNC: 135 MMOL/L (ref 136–145)
WBC # BLD AUTO: 9.91 K/UL (ref 3.9–12.7)

## 2020-07-29 PROCEDURE — 97161 PT EVAL LOW COMPLEX 20 MIN: CPT

## 2020-07-29 PROCEDURE — 85027 COMPLETE CBC AUTOMATED: CPT

## 2020-07-29 PROCEDURE — 97116 GAIT TRAINING THERAPY: CPT

## 2020-07-29 PROCEDURE — 97530 THERAPEUTIC ACTIVITIES: CPT

## 2020-07-29 PROCEDURE — 97165 OT EVAL LOW COMPLEX 30 MIN: CPT

## 2020-07-29 PROCEDURE — 36415 COLL VENOUS BLD VENIPUNCTURE: CPT

## 2020-07-29 PROCEDURE — 63600175 PHARM REV CODE 636 W HCPCS: Performed by: STUDENT IN AN ORGANIZED HEALTH CARE EDUCATION/TRAINING PROGRAM

## 2020-07-29 PROCEDURE — 80048 BASIC METABOLIC PNL TOTAL CA: CPT

## 2020-07-29 PROCEDURE — 11000001 HC ACUTE MED/SURG PRIVATE ROOM

## 2020-07-29 PROCEDURE — 25000003 PHARM REV CODE 250: Performed by: STUDENT IN AN ORGANIZED HEALTH CARE EDUCATION/TRAINING PROGRAM

## 2020-07-29 RX ORDER — METOPROLOL TARTRATE 25 MG/1
25 TABLET, FILM COATED ORAL ONCE
Status: COMPLETED | OUTPATIENT
Start: 2020-07-29 | End: 2020-07-29

## 2020-07-29 RX ADMIN — TELMISARTAN 80 MG: 20 TABLET ORAL at 08:07

## 2020-07-29 RX ADMIN — OXYCODONE HYDROCHLORIDE 10 MG: 10 TABLET ORAL at 07:07

## 2020-07-29 RX ADMIN — FAMOTIDINE 20 MG: 20 TABLET, FILM COATED ORAL at 08:07

## 2020-07-29 RX ADMIN — TAMSULOSIN HYDROCHLORIDE 0.4 MG: 0.4 CAPSULE ORAL at 08:07

## 2020-07-29 RX ADMIN — DOCUSATE SODIUM 50 MG: 50 CAPSULE, LIQUID FILLED ORAL at 08:07

## 2020-07-29 RX ADMIN — OXYCODONE HYDROCHLORIDE 15 MG: 10 TABLET ORAL at 03:07

## 2020-07-29 RX ADMIN — OXYCODONE HYDROCHLORIDE 15 MG: 10 TABLET ORAL at 08:07

## 2020-07-29 RX ADMIN — FINASTERIDE 5 MG: 5 TABLET, FILM COATED ORAL at 08:07

## 2020-07-29 RX ADMIN — METOPROLOL SUCCINATE 12.5 MG: 25 TABLET, EXTENDED RELEASE ORAL at 08:07

## 2020-07-29 RX ADMIN — MORPHINE SULFATE 3 MG: 2 INJECTION, SOLUTION INTRAMUSCULAR; INTRAVENOUS at 08:07

## 2020-07-29 RX ADMIN — MORPHINE SULFATE 3 MG: 2 INJECTION, SOLUTION INTRAMUSCULAR; INTRAVENOUS at 12:07

## 2020-07-29 RX ADMIN — METHOCARBAMOL TABLETS 750 MG: 750 TABLET, COATED ORAL at 11:07

## 2020-07-29 RX ADMIN — PREGABALIN 75 MG: 75 CAPSULE ORAL at 08:07

## 2020-07-29 RX ADMIN — MORPHINE SULFATE 3 MG: 2 INJECTION, SOLUTION INTRAMUSCULAR; INTRAVENOUS at 05:07

## 2020-07-29 RX ADMIN — ROSUVASTATIN CALCIUM 20 MG: 10 TABLET, FILM COATED ORAL at 08:07

## 2020-07-29 RX ADMIN — CEFAZOLIN 2 G: 1 INJECTION, POWDER, FOR SOLUTION INTRAMUSCULAR; INTRAVENOUS at 01:07

## 2020-07-29 RX ADMIN — OXYCODONE HYDROCHLORIDE 15 MG: 10 TABLET ORAL at 11:07

## 2020-07-29 RX ADMIN — METOPROLOL TARTRATE 25 MG: 25 TABLET, FILM COATED ORAL at 12:07

## 2020-07-29 RX ADMIN — CEFAZOLIN 2 G: 1 INJECTION, POWDER, FOR SOLUTION INTRAMUSCULAR; INTRAVENOUS at 04:07

## 2020-07-29 RX ADMIN — OXYCODONE HYDROCHLORIDE 10 MG: 10 TABLET ORAL at 04:07

## 2020-07-29 RX ADMIN — HYDROCHLOROTHIAZIDE 25 MG: 25 TABLET ORAL at 08:07

## 2020-07-29 NOTE — PLAN OF CARE
Dae Romo MD   [address] / [place] [address]       [id number] - [place], [place] - [address]  [address]  [place] [address]  Phone: [phone] Fax: [phone]    [facility] - [place], [place] - [address] AT Portal to Reg[facility]  [address]  [facility] [address]  Phone: [phone] Fax: [phone]     Payor: MEDICARE / Plan: MEDICARE PART A & B / Product Type: [facility] /           07/29/20 1112   Discharge Assessment   Assessment Type Discharge Planning Assessment   Confirmed/corrected address and phone number on facesheet? Yes   Assessment information obtained from? Patient   Prior to hospitilization cognitive status: Alert/Oriented   Prior to hospitalization functional status: Assistive Equipment   Current cognitive status: Alert/Oriented   Current Functional Status: Assistive Equipment   Lives With spouse   Able to Return to Prior Arrangements yes   Is patient able to care for self after discharge? Yes   Who are your caregiver(s) and their phone number(s)? Faith Burkett (spouse) [phone]   Patient's perception of discharge disposition home or selfcare   Readmission Within the Last 30 Days no previous admission in last 30 days   Patient currently being followed by outpatient case management? No   Patient currently receives any other outside agency services? No   Equipment Currently Used at Home cane, straight   Do you have any problems affording any of your prescribed medications? No   Is the patient taking medications as prescribed? yes   Does the patient have transportation home? Yes   Transportation Anticipated family or friend will provide   Discharge Plan A Home with family   Discharge Plan B Home Health   DME Needed Upon Discharge  other (see comments)  (TBD)   Patient/Family in Agreement with Plan yes

## 2020-07-29 NOTE — PLAN OF CARE
07/29/20 1116   Post-Acute Status   Post-Acute Authorization Other   Other Status No Post-Acute Service Needs   Discharge Delays None known at this time   Discharge Plan   Discharge Plan A Home with family   Discharge Plan B Home Health

## 2020-07-29 NOTE — PLAN OF CARE
Problem: Occupational Therapy Goal  Goal: Occupational Therapy Goal  Description: Goals to be met by: 8/12/2020     Patient will increase functional independence with ADLs by performing:    UE Dressing with Stand-by Assistance.  LE Dressing with Minimal Assistance and AE PRN.  Grooming while standing with Supervision.  Toileting from toilet with Stand-by Assistance for hygiene and clothing management.   Supine to sit with Supervision in preparation for EOB/OOB functional activities.  Toilet transfer to toilet with Supervision and LRAD PRN.    Outcome: Ongoing, Progressing    OT evaluation completed and POC established.  Jackeline Keita OT  7/29/2020

## 2020-07-29 NOTE — OP NOTE
DATE OF PROCEDURE: 7/28/20     SURGEON: Abraham Dang M.D.     ASSISTANT-SURGEON: Jade Baker M.D., PGY-4.     PREOPERATIVE DIAGNOSIS:     1:  Cervical spondylitic myelopathy     POSTOPERATIVE DIAGNOSIS:    1:  Cervical spondylitic myelopathy     PROCEDURES PERFORMED:  1.  Posterior cervical spinal fusion C3-C6  2.  C3-6 laminectomy for decompression of spinal stenosis  3.  Posterior segmental instrumentation C3-6  4.  Local bone grafting.     ANESTHESIA: General endotracheal anesthesia.     ESTIMATED BLOOD LOSS:  150  mL.     IMPLANTS:  Depuy Symphony     SPECIMENS: None.     FINDINGS:  None.     DRAINS: One deep.     COMPLICATIONS: None.     SPONGE AND NEEDLE COUNT: Correct x2.     NEUROLOGICAL MONITORING: Motor evoked potentials, somatosensory evoked  potential, and free-running EMG.  Overall lower extremity motors were poor, however, there were no changes to stable and reliable bilateral upper and  lower extremity motor and somatosensory potentials throughout the procedure.       DESCRIPTION INFORMED CONSENT:  I had a sit down discussion with the patient and his wife regarding a C3-6 laminectomy and fusion. We specifically discussed the risks, benefits, and alternatives to surgery. We discussed the surgical procedure including the skin incision, nerve decompression, bone fusion, allograft and/or iliac crest bone graft, and surgical implants including lateral mass screws and pedicle screws as indicated: they understand the risks include but are not limited to death, paralysis, blindness, bleeding, infection, damage to arteries, veins and nerves, vertebral artery injury, dysphagia, spinal fluid leak, continued or worsening pain, no improvement in symptoms, non-union, and the possible need for more surgery in the future, as well as the possibility other unforseen and unknown complications. We talked about expected hospital stay and recovery period. All questions were answered; they understand and wish to  proceed    REASON FOR OPERATION AND BRIEF HISTORY AND PHYSICAL:  The patient is a  64-year-old male with symptomatic cervical myelopathy.  He is here for surgery today.     DESCRIPTION OF PROCEDURE: The patient was met in the preoperative area where    His posterior cervical spine was marked as the operative site. Subsequently, they were    brought to the Operating Room where they was placed under general endotracheal    anesthesia. Sequential compression devices were placed in the patient's    bilateral calves and run throughout the case. A Sargent catheter was then sterilely placed. Benjamin[employer]ius [employer] tongs were then placed in the standard sterile fashion and the patient was positioned prone on a Jer table with 4 post pads.  The head was secured to 17 lb of in-line traction.  The arms were padded talked.  The shoulders were gently taped down.  The genitals were confirmed to be hanging free. The posterior cervical spine was then prepped and draped in a normal sterile fashion     A full timeout was then called, identifying the patient, the procedural site and  levels, the availability of all instruments and implants, and no specific    nursing, surgical, anesthetic, or neurological monitoring concerns. Finally, it  was safe to proceed with surgery and the patient was given weight-appropriate    dose of Ancef by the Anesthesia Service.     I then infiltrated my planned incision with 10 mL of 0.5% Marcaine with    epinephrine.     We then made a midline posterior cervical incision and performed a preliminary subperiosteal exposure with a took to be the C3-C6 lamina transverse processes and relevant intervening facet joints. At the conclusion of my preliminary exposure we placed an elevator and wanted took to be the right C3-4 facet joint took a lateral radiograph and  I confirmed the levels radiographically. Next we prepared lateral mass screw tracts in the standard fashion from C3-C6 bilaterally but did not place screws  to facilitate laminectomy.      We then began the neurological decompression portion of the procedure. A high-speed drill was used to perform a trough style laminectomy from C3-6  bilaterally.  These laminar fragments were then removed on block.  Epidural hemostasis was achieved with a combination of bipolar electrocautery as well as FloSeal and patties.  Wound was thoroughly irrigated. Screws were placed in the previously prepared tracts from C3-6.  AP and lateral radiographs then taken demonstrating correct level as well as adequate position of all implants.  All bony surfaces were then decorticated from C3-6.  Rods were then measured cut contoured and locked into place with many fracture provided set plugs and torque wrench.  A cross-link was placed at the C4 level. A deep drain was then placed.  The patient's local bone graft was milled in a bone mill and mixed with 1 g of vancomycin powder and laid dorsally along the decorticated surfaces from C3-C6  bilaterally.     At this point the wound was closed in layers using 0 Ethibond for the fascia, 2-0 Vicryl for the dermis and 3-0 Monocryl for the skin.     The patient was then flipped supine the Benjamin-Wells tongs removed he was extubated and transferred to the recovery room in stable condition.

## 2020-07-29 NOTE — PROGRESS NOTES
[facility]  Orthopedics  Progress Note    Patient Name: Elmer Borrero  MRN: [id number]  Admission Date: 7/28/2020  Hospital Length of Stay: 1 days  Attending Provider: Abraham Dang MD  Primary Care Provider: Dae Romo MD  Follow-up For: Procedure(s) (LRB):  LAMINECTOMY, SPINE, CERVICAL, WITH POSTERIOR FUSION C3-6 Depuy SNS:Motors/SSEP/EMG Old Jer + Pads + tongs  (N/A)    Post-Operative Day: 1 Day Post-Op  Subjective:     Principal Problem:Cervical stenosis of spine    Principal Orthopedic Problem: s/p PCF C3-C6    Interval History: NAEON. Pt doing well this am. Pain controlled.home CPAP overnight. Denies new numbness/tingling. C collar in place.     Review of patient's allergies indicates:   Allergen Reactions    Ace inhibitors Other (See Comments)     cough       Current Facility-Administered Medications   Medication    0.9%  NaCl infusion    0.9%  NaCl infusion    ceFAZolin injection 2 g    diphenhydrAMINE capsule 25 mg    docusate sodium capsule 50 mg    famotidine tablet 20 mg    finasteride tablet 5 mg    telmisartan tablet 80 mg    And    hydroCHLOROthiazide tablet 25 mg    methocarbamoL tablet 750 mg    metoprolol succinate (TOPROL-XL) 24 hr split tablet 12.5 mg    morphine injection 3 mg    ondansetron disintegrating tablet 8 mg    ondansetron injection 4 mg    oxyCODONE immediate release tablet 10 mg    oxyCODONE immediate release tablet 15 mg    oxyCODONE immediate release tablet 5 mg    polyethylene glycol packet 17 g    pregabalin capsule 75 mg    rosuvastatin tablet 20 mg    sodium chloride 0.9% flush 10 mL    sodium chloride 0.9% flush 5 mL    tamsulosin 24 hr capsule 0.4 mg     Objective:     Vital Signs (Most Recent):  Temp: 97.4 °F (36.3 °C) (07/29/20 0505)  Pulse: 74 (07/29/20 0505)  Resp: 18 (07/29/20 0407)  BP: (!) 150/82 (07/29/20 0505)  SpO2: 95 % (07/29/20 0505) Vital Signs (24h Range):  Temp:  [96 °F (35.6 °C)-98.3 °F (36.8 °C)]  "97.4 °F (36.3 °C)  Pulse:  [69-82] 74  Resp:  [12-20] 18  SpO2:  [92 %-100 %] 95 %  BP: (150-190)/(69-99) 150/82     Weight: 126.6 kg (279 lb 1.6 oz)  Height: 5' 7" (170.2 cm)  Body mass index is 43.71 kg/m².      Intake/Output Summary (Last 24 hours) at 7/29/2020 0655  Last data filed at 7/29/2020 0342  Gross per 24 hour   Intake 2330 ml   Output 1380 ml   Net 950 ml       Ortho/SPM Exam   C collar in place  Posterior cervical dressing c/d/i  hemovac drain to gravity with 60cc sanguinous output  Neuro exams table    Significant Labs:   CBC:   Recent Labs   Lab 07/28/20  1611 07/29/20  0504   WBC 5.81 9.91   HGB 12.8* 12.1*   HCT 38.4* 35.0*    217     CMP:   Recent Labs   Lab 07/28/20  1611      K 3.8      CO2 22*   *   BUN 14   CREATININE 0.8   CALCIUM 8.8   PROT 6.8   ALBUMIN 3.8   BILITOT 0.4   ALKPHOS 54*   AST 28   ALT 36   ANIONGAP 10   EGFRNONAA >60.0     All pertinent labs within the past 24 hours have been reviewed.    Significant Imaging: I have reviewed and interpreted all pertinent imaging results/findings.    Assessment/Plan:     * Cervical stenosis of spine  Elmer Borrero is a 64 y.o. male with cervical stenosis s/p PCF C3-C6 (7/28/20)    Pain control: multimodal  PT/OT: WBAT BLE  DVT PPx: no chemical ppx, SCDs at all times when not ambulating  Abx: postop Ancef  Labs: Hg 12.1 WBC 9.9 Plt 217  Drain: drain with 60 cc outpt  Sargent: d/c this am  C collar at all times    Dispo:pending pain control and mobilization            Jade Baker MD  Orthopedics  [facility]  "

## 2020-07-29 NOTE — PT/OT/SLP EVAL
"Physical Therapy Evaluation    Patient Name:  Elmer Borrero   MRN:  [id number]    Recommendations:     Discharge Recommendations:  outpatient PT   Discharge Equipment Recommendations: none   Barriers to discharge: Decreased caregiver support    Assessment:       Elmer Borrero is a 64 y.o. male admitted with a medical diagnosis of Cervical stenosis of spine.  He presents with the following impairments/functional limitations:  weakness, impaired endurance, impaired self care skills, impaired functional mobilty, gait instability, impaired balance, pain, decreased safety awareness, decreased lower extremity function, decreased coordination, decreased ROM, impaired muscle length, orthopedic precautions.       Patient demonstrates good motivation and decreased activity tolerance secondary to dizziness.  Patient demonstrates good strength with mild dizziness and decreased balance during ambulation.  Patient required CGA for bed mobility, transfers and ambulation.  Patient ambulated 30 ft with rolling walker and CGA for balance.  Patient demonstrates good understanding of spinal precautions with good safety.  Patient will benefit from skilled PT for strengthening and mobility training.      Rehab Prognosis: Good; patient would benefit from acute skilled PT services daily to address these deficits and reach maximum level of function.  Patient is most appropriate to go to outpatient PT .  Recent Surgery: Procedure(s) (LRB):  LAMINECTOMY, SPINE, CERVICAL, WITH POSTERIOR FUSION C3-6 Depuy SNS:Motors/SSEP/EMG Old Jer + Pads + tongs  (N/A) 1 Day Post-Op    Plan:     During this hospitalization, patient to be seen daily to address the identified rehab impairments via gait training, therapeutic activities, therapeutic exercises, neuromuscular re-education and progress toward the following goals:    · Plan of Care Expires:  08/28/20    Subjective     Subjective:"I feel a little off after my pain " "medication"  Pain/Comfort:  · Pain Rating 1: 5/10  · Location - Orientation 1: generalized  · Location 1: neck  · Pain Addressed 1: Pre-medicate for activity, Reposition, Cessation of Activity    Patients cultural, spiritual, Denominational conflicts given the current situation: no    Living Environment:  Prior level of function: independent with intermittent use of straight cane.  Independent with ADLs.    Residence: lives with their spouse 1-story house/ trailer, ramped, tub-shower   Support available upon discharge: family  Equipment owned: cane, straight, walker, rolling, rollator, shower chair  Objective:     Communicated with RN prior to session.  Patient found supine with telemetry, cervical collar, hemovac, peripheral IV  upon PT entry to room.    General Precautions: Standard, fall   Orthopedic Precautions:spinal precautions   Braces: Aspen collar     Exams:  · RLE ROM: WFL  · RLE Strength: Deficits: grossly 4/5  · LLE ROM: WFL  · LLE Strength: Deficits: grossly 4/5  · Cognitive Exam:  Patient is oriented to Person, Place, Time and Situation      Functional Mobility:  · Bed Mobility:     · Supine to Sit: contact guard assistance with cues for log rolling technique  · Transfers:     · Sit to Stand:  contact guard assistance with rolling walker  · Gait: Patient ambulated 30 ft with rolling walker and contact guard assistance.  · Balance:   · Sitting: GOOD: Maintains balance through MODERATE excursions of active trunk movement  · Standing: FAIR: Needs CONTACT GUARD during gait with increased trunk sway in gait.      Therapeutic Activities and Exercises:   Gait training:  Patient required cues for position in walker, sequencing and upright posture to increase independence and safety.  Patient required cues ~ 25% of the time.    Patient Education:    Patient educated on assistive device use, Fall risk, gait training, home safety, Home exercise program, sternal precautions and transfer training by explanation.  " Patient was receptive to education and needs reinforcement.     AM-PAC 6 CLICK MOBILITY  Total Score:18     Patient left up in chair with all lines intact and call button in reach.    GOALS:   Multidisciplinary Problems     Physical Therapy Goals        Problem: Physical Therapy Goal    Goal Priority Disciplines Outcome Goal Variances Interventions   Physical Therapy Goal     PT, PT/OT Ongoing, Progressing     Description: Goals to be met by: 20    Patient will increase functional independence with mobility by performin. Supine to sit with Set-up [place]  2. Sit to supine with Set-up [place]  3. Sit to stand transfer with Supervision  4. Gait  x 150 feet with Supervision using Rolling Walker.   5.  Patient will demonstrate independence with a home exercise program  6. Patient's balance will be GOOD-: Needs SUPERVISION only during gait and able to self right with moderate LOB inconsistently.                   History:     Past Medical History:   Diagnosis Date    Actinic keratosis     Arthritis     BPH (benign prostatic hyperplasia)     Cataract     Coronary artery disease     Fatty liver     Heart attack     Hyperlipidemia     Hypertension     Kidney stone     Morbid obesity with BMI of 40.0-44.9, adult 10/2/2019    Obesity (BMI 30-39.9) 10/2/2019    GEOVANNA on CPAP     Renal calculi     Subclinical hypothyroidism        Past Surgical History:   Procedure Laterality Date    JOINT REPLACEMENT      left knee    KNEE SURGERY      LEFT HEART CATHETERIZATION Left 2019    Procedure: Left heart cath;  Surgeon: Dejan Nielsen MD;  Location: [facility] CATH;  Service: Cardiovascular;  Laterality: Left;    PERCUTANEOUS CRYOTHERAPY OF PERIPHERAL NERVE USING LIQUID NITROUS OXIDE IN CLOSED NEEDLE DEVICE Right 2019    Procedure: CRYOTHERAPY, NERVE, PERIPHERAL, PERCUTANEOUS, USING LIQUID NITROUS OXIDE IN CLOSED NEEDLE DEVICE;  Surgeon: CHYNA Quevedo;  Location: [facility] CATH LAB;   Service: Pain Management;  Laterality: Right;  Right Knee IOVERA    right knee replacement       SPINE SURGERY      around 2010     TOTAL KNEE ARTHROPLASTY Right 9/5/2019    Procedure: ARTHROPLASTY, KNEE, TOTAL-VINCENZO-SAME DAY;  Surgeon: Arturo Schultz MD;  Location: [facility] OR [address] [place] [address];  Service: Orthopedics;  Laterality: Right;       Time Tracking:     PT Received On: 07/29/20  PT Start Time: 0954     PT Stop Time: 1021  PT Total Time (min): 27 min     Billable Minutes: Evaluation 10 min Gait Training 17 min      Enzo Verduzco, PT  07/29/2020

## 2020-07-29 NOTE — SUBJECTIVE & OBJECTIVE
"Principal Problem:Cervical stenosis of spine    Principal Orthopedic Problem: s/p PCF C3-C6    Interval History: NAEON. Pt doing well this am. Pain controlled.home CPAP overnight. Denies new numbness/tingling. C collar in place.     Review of patient's allergies indicates:   Allergen Reactions    Ace inhibitors Other (See Comments)     cough       Current Facility-Administered Medications   Medication    0.9%  NaCl infusion    0.9%  NaCl infusion    ceFAZolin injection 2 g    diphenhydrAMINE capsule 25 mg    docusate sodium capsule 50 mg    famotidine tablet 20 mg    finasteride tablet 5 mg    telmisartan tablet 80 mg    And    hydroCHLOROthiazide tablet 25 mg    methocarbamoL tablet 750 mg    metoprolol succinate (TOPROL-XL) 24 hr split tablet 12.5 mg    morphine injection 3 mg    ondansetron disintegrating tablet 8 mg    ondansetron injection 4 mg    oxyCODONE immediate release tablet 10 mg    oxyCODONE immediate release tablet 15 mg    oxyCODONE immediate release tablet 5 mg    polyethylene glycol packet 17 g    pregabalin capsule 75 mg    rosuvastatin tablet 20 mg    sodium chloride 0.9% flush 10 mL    sodium chloride 0.9% flush 5 mL    tamsulosin 24 hr capsule 0.4 mg     Objective:     Vital Signs (Most Recent):  Temp: 97.4 °F (36.3 °C) (07/29/20 0505)  Pulse: 74 (07/29/20 0505)  Resp: 18 (07/29/20 0407)  BP: (!) 150/82 (07/29/20 0505)  SpO2: 95 % (07/29/20 0505) Vital Signs (24h Range):  Temp:  [96 °F (35.6 °C)-98.3 °F (36.8 °C)] 97.4 °F (36.3 °C)  Pulse:  [69-82] 74  Resp:  [12-20] 18  SpO2:  [92 %-100 %] 95 %  BP: (150-190)/(69-99) 150/82     Weight: 126.6 kg (279 lb 1.6 oz)  Height: 5' 7" (170.2 cm)  Body mass index is 43.71 kg/m².      Intake/Output Summary (Last 24 hours) at 7/29/2020 0655  Last data filed at 7/29/2020 0342  Gross per 24 hour   Intake 2330 ml   Output 1380 ml   Net 950 ml       Ortho/SPM Exam   C collar in place  Posterior cervical dressing c/d/i  hemovac drain to " gravity with 60cc sanguinous output  Neuro exams table    Significant Labs:   CBC:   Recent Labs   Lab 07/28/20  1611 07/29/20  0504   WBC 5.81 9.91   HGB 12.8* 12.1*   HCT 38.4* 35.0*    217     CMP:   Recent Labs   Lab 07/28/20  1611      K 3.8      CO2 22*   *   BUN 14   CREATININE 0.8   CALCIUM 8.8   PROT 6.8   ALBUMIN 3.8   BILITOT 0.4   ALKPHOS 54*   AST 28   ALT 36   ANIONGAP 10   EGFRNONAA >60.0     All pertinent labs within the past 24 hours have been reviewed.    Significant Imaging: I have reviewed and interpreted all pertinent imaging results/findings.

## 2020-07-29 NOTE — PLAN OF CARE
Problem: Physical Therapy Goal  Goal: Physical Therapy Goal  Description: Goals to be met by: 20    Patient will increase functional independence with mobility by performin. Supine to sit with Set-up [place]  2. Sit to supine with Set-up [place]  3. Sit to stand transfer with Supervision  4. Gait  x 150 feet with Supervision using Rolling Walker.   5.  Patient will demonstrate independence with a home exercise program  6. Patient's balance will be GOOD-: Needs SUPERVISION only during gait and able to self right with moderate LOB inconsistently.  Outcome: Ongoing, Progressing     PT evaluation completed, goals established and plan of care reviewed with patient.  Patient demonstrates good motivation and decreased activity tolerance secondary to dizziness.  Patient demonstrates good strength with mild dizziness and decreased balance during ambulation.  Patient required CGA for bed mobility, transfers and ambulation.  Patient ambulated 30 ft with rolling walker and CGA for balance.  Patient demonstrates good understanding of spinal precautions with good safety.  Patient will benefit from skilled PT for strengthening and mobility training.      Enzo Verduzco PT, DPT  2020  Pager #: [phone]

## 2020-07-29 NOTE — ANESTHESIA POSTPROCEDURE EVALUATION
Anesthesia Post Evaluation    Patient: Elmer Borrero    Procedure(s) Performed: Procedure(s) (LRB):  LAMINECTOMY, SPINE, CERVICAL, WITH POSTERIOR FUSION C3-6 Depuy SNS:Motors/SSEP/EMG Old Jer + Pads + tongs  (N/A)    Final Anesthesia Type: general    Patient location during evaluation: PACU  Patient participation: Yes- Able to Participate  Level of consciousness: awake and alert and oriented  Post-procedure vital signs: reviewed and stable  Pain management: adequate  Airway patency: patent    PONV status at discharge: No PONV  Anesthetic complications: no      Cardiovascular status: hemodynamically stable  Respiratory status: spontaneous ventilation  Hydration status: euvolemic  Follow-up not needed.              Event Time   Out of Recovery 16:30:00         Pain/John Score: Pain Rating Prior to Med Admin: 8 (7/29/2020 12:18 PM)  John Score: 9 (7/28/2020  6:31 PM)

## 2020-07-29 NOTE — PT/OT/SLP EVAL
Occupational Therapy   Co-Evaluation    Name: Elmer Borrero  MRN: [id number]  Admitting Diagnosis:  Cervical stenosis of spine 1 Day Post-Op   Procedure(s):  LAMINECTOMY, SPINE, CERVICAL, WITH POSTERIOR FUSION C3-6 Depuy SNS:Motors/SSEP/EMG Old Jer + Pads + tongs      Recommendations:     Discharge Recommendations: outpatient OT  Discharge Equipment Recommendations:  none  Barriers to discharge:  None    Assessment:     Elmer Borrero is a 64 y.o. male with a medical diagnosis of Cervical stenosis of spine.  He presents with a decline in occupational participation and functional mobility following surgery. Patient is agreeable to participate with evaluation and tolerates it well. Performance deficits affecting function: weakness, impaired endurance, impaired self care skills, impaired functional mobilty, gait instability, impaired balance, pain, orthopedic precautions.      Rehab Prognosis: Good; patient would benefit from acute skilled OT services to address these deficits and reach maximum level of function.       Plan:     Patient to be seen daily to address the above listed problems via self-care/home management, therapeutic activities, therapeutic exercises, neuromuscular re-education  · Plan of Care Expires: 08/29/20  · Plan of Care Reviewed with: patient    Subjective     Chief Complaint: Pain in L shoulder and at surgical site  Patient/Family Comments/goals: To feel better and return home    Occupational Profile:  Living Environment: Patient lives with his wife in a H with ramp access at his back entry. His bathroom has a WIS.  Previous level of function: Independent PTA, mostly independent with functional mobility but reports using a SPC occasionally.  Roles and Routines: . Patient drives and is retired.  Equipment Used at Home:  cane, straight, walker, rolling, rollator, shower chair  Assistance upon Discharge: Wife is able to assist upon d/c    Pain/Comfort:  · Pain Rating 1:  (4/10 in L shoulder, 6/10 at incision site)  · Pain Addressed 1: Reposition, Distraction    Patients cultural, spiritual, Buddhism conflicts given the current situation: no    Objective:     Communicated with: RN prior to session.  Patient found HOB elevated with cervical collar, hemovac, peripheral IV, telemetry upon OT entry to room.    General Precautions: Standard, fall   Orthopedic Precautions:spinal precautions   Braces: Aspen collar     Occupational Performance:    Bed Mobility:    · Patient completed Supine to Sit to L side EOB via log roll with contact guard assistance and HOB elevated  · Patient completed Scooting anteriorly to EOB for foot placement on floor with contact guard assistance    Functional Mobility/Transfers:  · Patient completed Sit <> Stand Transfer with contact guard assistance with rolling walker   · Functional Mobility: ~30' with CGA and RW, no LOB/SOB noted    Activities of Daily Living:  · Grooming: contact guard assistance Patient participated in a face wash while standing at the sink in the restroom with CGA for standing balance.    Cognitive/Visual Perceptual:  Cognitive/Psychosocial Skills:     -       Oriented to: Person, Place, Time and Situation   -       Follows Commands/attention:Follows multistep  commands    Physical Exam:  Upper Extremity Range of Motion:     -       Right Upper Extremity: WFL  -       Left Upper Extremity: WFL  Upper Extremity Strength:    -       Right Upper Extremity: DNT 2' spinal precautions  -       Left Upper Extremity: DNT 2' spinal precautions\   Strength:    -       Right Upper Extremity: WFL  -       Left Upper Extremity: WFL  Fine Motor Coordination:    -       Intact  Left hand thumb/finger opposition skills and Right hand thumb/finger opposition skills    AMPAC 6 Click ADL:  AMPAC Total Score: 17    Treatment & Education:  Role of OT/evaluation  Educated on log rolling technique  Educated on spinal precautions  Educated on importance of  sitting UIC and participation in OOB activity with therapy/staff assistance while in acute setting  Call button for assistance  Education:    Patient left up in chair with all lines intact, call button in reach and RN notified    GOALS:   Multidisciplinary Problems     Occupational Therapy Goals        Problem: Occupational Therapy Goal    Goal Priority Disciplines Outcome Interventions   Occupational Therapy Goal     OT, PT/OT Ongoing, Progressing    Description: Goals to be met by: 8/12/2020     Patient will increase functional independence with ADLs by performing:    UE Dressing with Stand-by Assistance.  LE Dressing with Minimal Assistance and AE PRN.  Grooming while standing with Supervision.  Toileting from toilet with Stand-by Assistance for hygiene and clothing management.   Supine to sit with Supervision in preparation for EOB/OOB functional activities.  Toilet transfer to toilet with Supervision and LRAD PRN.                     History:     Past Medical History:   Diagnosis Date    Actinic keratosis     Arthritis     BPH (benign prostatic hyperplasia)     Cataract     Coronary artery disease     Fatty liver     Heart attack     Hyperlipidemia     Hypertension     Kidney stone     Morbid obesity with BMI of 40.0-44.9, adult 10/2/2019    Obesity (BMI 30-39.9) 10/2/2019    GEOVANNA on CPAP     Renal calculi     Subclinical hypothyroidism        Past Surgical History:   Procedure Laterality Date    JOINT REPLACEMENT      left knee    KNEE SURGERY      LEFT HEART CATHETERIZATION Left 11/27/2019    Procedure: Left heart cath;  Surgeon: Dejan Nielsen MD;  Location: [facility] CATH;  Service: Cardiovascular;  Laterality: Left;    PERCUTANEOUS CRYOTHERAPY OF PERIPHERAL NERVE USING LIQUID NITROUS OXIDE IN CLOSED NEEDLE DEVICE Right 8/26/2019    Procedure: CRYOTHERAPY, NERVE, PERIPHERAL, PERCUTANEOUS, USING LIQUID NITROUS OXIDE IN CLOSED NEEDLE DEVICE;  Surgeon: CHYNA Quevedo;  Location: [facility]  CATH LAB;  Service: Pain Management;  Laterality: Right;  Right Knee IOVERA    POSTERIOR FUSION OF CERVICAL SPINE WITH LAMINECTOMY N/A 7/28/2020    Procedure: LAMINECTOMY, SPINE, CERVICAL, WITH POSTERIOR FUSION C3-6 Depuy SNS:Motors/SSEP/EMG Old Jer + Pads + tongs ;  Surgeon: Abraham Dang MD;  Location: [facility] OR [address] [place] [address];  Service: Orthopedics;  Laterality: N/A;    right knee replacement       SPINE SURGERY      around 2010     TOTAL KNEE ARTHROPLASTY Right 9/5/2019    Procedure: ARTHROPLASTY, KNEE, TOTAL-VINCENZO-SAME DAY;  Surgeon: Arturo Schultz MD;  Location: [facility] OR [address] [place] [address];  Service: Orthopedics;  Laterality: Right;       Time Tracking:     OT Date of Treatment: 07/29/20  OT Start Time: 0953  OT Stop Time: 1019  OT Total Time (min): 26 min    Billable Minutes:Evaluation 13 minutes  Therapeutic Activity 13 minutes    Jackeline Keita OT  7/29/2020

## 2020-07-30 LAB
ANION GAP SERPL CALC-SCNC: 9 MMOL/L (ref 8–16)
BUN SERPL-MCNC: 12 MG/DL (ref 8–23)
CALCIUM SERPL-MCNC: 9.1 MG/DL (ref 8.7–10.5)
CHLORIDE SERPL-SCNC: 97 MMOL/L (ref 95–110)
CO2 SERPL-SCNC: 28 MMOL/L (ref 23–29)
CREAT SERPL-MCNC: 0.8 MG/DL (ref 0.5–1.4)
ERYTHROCYTE [DISTWIDTH] IN BLOOD BY AUTOMATED COUNT: 12.6 % (ref 11.5–14.5)
EST. GFR  (AFRICAN AMERICAN): >60 ML/MIN/1.73 M^2
EST. GFR  (NON AFRICAN AMERICAN): >60 ML/MIN/1.73 M^2
GLUCOSE SERPL-MCNC: 105 MG/DL (ref 70–110)
HCT VFR BLD AUTO: 37.7 % (ref 40–54)
HGB BLD-MCNC: 12.2 G/DL (ref 14–18)
MCH RBC QN AUTO: 31.1 PG (ref 27–31)
MCHC RBC AUTO-ENTMCNC: 32.4 G/DL (ref 32–36)
MCV RBC AUTO: 96 FL (ref 82–98)
PLATELET # BLD AUTO: 212 K/UL (ref 150–350)
PMV BLD AUTO: 10.4 FL (ref 9.2–12.9)
POTASSIUM SERPL-SCNC: 3.6 MMOL/L (ref 3.5–5.1)
RBC # BLD AUTO: 3.92 M/UL (ref 4.6–6.2)
SODIUM SERPL-SCNC: 134 MMOL/L (ref 136–145)
WBC # BLD AUTO: 10.06 K/UL (ref 3.9–12.7)

## 2020-07-30 PROCEDURE — 36415 COLL VENOUS BLD VENIPUNCTURE: CPT

## 2020-07-30 PROCEDURE — 97530 THERAPEUTIC ACTIVITIES: CPT

## 2020-07-30 PROCEDURE — 25000003 PHARM REV CODE 250: Performed by: STUDENT IN AN ORGANIZED HEALTH CARE EDUCATION/TRAINING PROGRAM

## 2020-07-30 PROCEDURE — 80048 BASIC METABOLIC PNL TOTAL CA: CPT

## 2020-07-30 PROCEDURE — 97535 SELF CARE MNGMENT TRAINING: CPT

## 2020-07-30 PROCEDURE — 63600175 PHARM REV CODE 636 W HCPCS: Performed by: STUDENT IN AN ORGANIZED HEALTH CARE EDUCATION/TRAINING PROGRAM

## 2020-07-30 PROCEDURE — 11000001 HC ACUTE MED/SURG PRIVATE ROOM

## 2020-07-30 PROCEDURE — 85027 COMPLETE CBC AUTOMATED: CPT

## 2020-07-30 RX ORDER — ACETAMINOPHEN 325 MG/1
650 TABLET ORAL EVERY 6 HOURS
Status: DISCONTINUED | OUTPATIENT
Start: 2020-07-30 | End: 2020-07-31

## 2020-07-30 RX ORDER — GABAPENTIN 300 MG/1
300 CAPSULE ORAL 3 TIMES DAILY
Status: DISCONTINUED | OUTPATIENT
Start: 2020-07-30 | End: 2020-07-31

## 2020-07-30 RX ADMIN — TICAGRELOR 90 MG: 90 TABLET ORAL at 08:07

## 2020-07-30 RX ADMIN — FAMOTIDINE 20 MG: 20 TABLET, FILM COATED ORAL at 08:07

## 2020-07-30 RX ADMIN — ACETAMINOPHEN 650 MG: 325 TABLET ORAL at 10:07

## 2020-07-30 RX ADMIN — DOCUSATE SODIUM 50 MG: 50 CAPSULE, LIQUID FILLED ORAL at 08:07

## 2020-07-30 RX ADMIN — MORPHINE SULFATE 3 MG: 2 INJECTION, SOLUTION INTRAMUSCULAR; INTRAVENOUS at 08:07

## 2020-07-30 RX ADMIN — METHOCARBAMOL TABLETS 750 MG: 750 TABLET, COATED ORAL at 06:07

## 2020-07-30 RX ADMIN — FAMOTIDINE 20 MG: 20 TABLET, FILM COATED ORAL at 09:07

## 2020-07-30 RX ADMIN — FINASTERIDE 5 MG: 5 TABLET, FILM COATED ORAL at 09:07

## 2020-07-30 RX ADMIN — ROSUVASTATIN CALCIUM 20 MG: 10 TABLET, FILM COATED ORAL at 08:07

## 2020-07-30 RX ADMIN — ACETAMINOPHEN 650 MG: 325 TABLET ORAL at 06:07

## 2020-07-30 RX ADMIN — OXYCODONE HYDROCHLORIDE 15 MG: 10 TABLET ORAL at 12:07

## 2020-07-30 RX ADMIN — OXYCODONE HYDROCHLORIDE 15 MG: 10 TABLET ORAL at 02:07

## 2020-07-30 RX ADMIN — OXYCODONE HYDROCHLORIDE 15 MG: 10 TABLET ORAL at 06:07

## 2020-07-30 RX ADMIN — METOPROLOL SUCCINATE 12.5 MG: 25 TABLET, EXTENDED RELEASE ORAL at 09:07

## 2020-07-30 RX ADMIN — OXYCODONE HYDROCHLORIDE 15 MG: 10 TABLET ORAL at 10:07

## 2020-07-30 RX ADMIN — TELMISARTAN 80 MG: 20 TABLET ORAL at 09:07

## 2020-07-30 RX ADMIN — DOCUSATE SODIUM 50 MG: 50 CAPSULE, LIQUID FILLED ORAL at 09:07

## 2020-07-30 RX ADMIN — GABAPENTIN 300 MG: 300 CAPSULE ORAL at 10:07

## 2020-07-30 RX ADMIN — MORPHINE SULFATE 3 MG: 2 INJECTION, SOLUTION INTRAMUSCULAR; INTRAVENOUS at 12:07

## 2020-07-30 RX ADMIN — HYDROCHLOROTHIAZIDE 25 MG: 25 TABLET ORAL at 09:07

## 2020-07-30 RX ADMIN — GABAPENTIN 300 MG: 300 CAPSULE ORAL at 08:07

## 2020-07-30 RX ADMIN — TICAGRELOR 90 MG: 90 TABLET ORAL at 09:07

## 2020-07-30 RX ADMIN — OXYCODONE HYDROCHLORIDE 15 MG: 10 TABLET ORAL at 05:07

## 2020-07-30 RX ADMIN — TAMSULOSIN HYDROCHLORIDE 0.4 MG: 0.4 CAPSULE ORAL at 09:07

## 2020-07-30 RX ADMIN — GABAPENTIN 300 MG: 300 CAPSULE ORAL at 02:07

## 2020-07-30 RX ADMIN — METHOCARBAMOL TABLETS 750 MG: 750 TABLET, COATED ORAL at 01:07

## 2020-07-30 NOTE — PT/OT/SLP PROGRESS
Physical Therapy      Patient Name:  Elmer Borrero   MRN:  [id number]    Attempted to see patient for PT; however patient requesting to hold secondary to pain and inability to get comfortable.  RN reports patient has been having trouble managing pain throughout day. Physical therapy will follow-up with patient at next scheduled visit.    Enzo Verduzco, PT

## 2020-07-30 NOTE — SUBJECTIVE & OBJECTIVE
"Principal Problem:Cervical stenosis of spine    Principal Orthopedic Problem: s/p PCF C3-C6    Interval History: NAEON. Pt reports pain in left shoulder. HTN overnight, given 1x dose of lopressor. Pt reports -150 at home. Denies numbness/tingling. 30 ft with PT/OT yest.   Review of patient's allergies indicates:   Allergen Reactions    Ace inhibitors Other (See Comments)     cough       Current Facility-Administered Medications   Medication    0.9%  NaCl infusion    diphenhydrAMINE capsule 25 mg    docusate sodium capsule 50 mg    famotidine tablet 20 mg    finasteride tablet 5 mg    telmisartan tablet 80 mg    And    hydroCHLOROthiazide tablet 25 mg    methocarbamoL tablet 750 mg    metoprolol succinate (TOPROL-XL) 24 hr split tablet 12.5 mg    morphine injection 3 mg    ondansetron disintegrating tablet 8 mg    ondansetron injection 4 mg    oxyCODONE immediate release tablet 10 mg    oxyCODONE immediate release tablet 15 mg    oxyCODONE immediate release tablet 5 mg    polyethylene glycol packet 17 g    pregabalin capsule 75 mg    rosuvastatin tablet 20 mg    sodium chloride 0.9% flush 10 mL    sodium chloride 0.9% flush 5 mL    tamsulosin 24 hr capsule 0.4 mg     Objective:     Vital Signs (Most Recent):  Temp: 98 °F (36.7 °C) (07/30/20 0025)  Pulse: 79 (07/30/20 0700)  Resp: 16 (07/30/20 0700)  BP: (!) 163/84 (07/30/20 0700)  SpO2: 96 % (07/30/20 0700) Vital Signs (24h Range):  Temp:  [96.8 °F (36 °C)-98.4 °F (36.9 °C)] 98 °F (36.7 °C)  Pulse:  [79-94] 79  Resp:  [16-21] 16  SpO2:  [95 %-98 %] 96 %  BP: (143-196)/(74-90) 163/84     Weight: 126.6 kg (279 lb 1.6 oz)  Height: 5' 7" (170.2 cm)  Body mass index is 43.71 kg/m².      Intake/Output Summary (Last 24 hours) at 7/30/2020 0744  Last data filed at 7/30/2020 0200  Gross per 24 hour   Intake 550 ml   Output 1440 ml   Net -890 ml       Ortho/SPM Exam     C collar in place  Posterior cervical dressing c/d/i  hemovac drain to gravity " with 140cc sanguinous output  Neuro exams table    Significant Labs:   CBC:   Recent Labs   Lab 07/28/20  1611 07/29/20  0504 07/30/20  0518   WBC 5.81 9.91 10.06   HGB 12.8* 12.1* 12.2*   HCT 38.4* 35.0* 37.7*    217 212     CMP:   Recent Labs   Lab 07/28/20  1611 07/29/20  0504 07/30/20  0518    135* 134*   K 3.8 3.9 3.6    102 97   CO2 22* 23 28   * 155* 105   BUN 14 15 12   CREATININE 0.8 0.9 0.8   CALCIUM 8.8 8.4* 9.1   PROT 6.8  --   --    ALBUMIN 3.8  --   --    BILITOT 0.4  --   --    ALKPHOS 54*  --   --    AST 28  --   --    ALT 36  --   --    ANIONGAP 10 10 9   EGFRNONAA >60.0 >60.0 >60.0     All pertinent labs within the past 24 hours have been reviewed.    Significant Imaging: I have reviewed and interpreted all pertinent imaging results/findings.

## 2020-07-30 NOTE — PT/OT/SLP PROGRESS
Occupational Therapy   Treatment    Name: Elmer Borrero  MRN: [id number]  Admitting Diagnosis:  Cervical stenosis of spine  2 Days Post-Op    Recommendations:     Discharge Recommendations: outpatient OT  Discharge Equipment Recommendations:  none  Barriers to discharge:  None    Assessment:     Elmer Borrero is a 64 y.o. male with a medical diagnosis of Cervical stenosis of spine.  He was able to perform sit/stand and BSC/toilet T/F c min A.  Pt has difficulty performing T/F's d/t shooting pain when he begins to sit.  Was able to perform UB dressing c max A and toilet hygiene c min A.  Was able to walk to bathroom c CGA and RW.  Performed sit/supine T/F c min A.  Pt is progressing well. Performance deficits affecting function are weakness, impaired endurance, impaired self care skills, impaired functional mobilty, impaired sensation, impaired balance, decreased upper extremity function, decreased ROM, impaired coordination, impaired fine motor, orthopedic precautions.     Rehab Prognosis:  Good; patient would benefit from acute skilled OT services to address these deficits and reach maximum level of function.       Plan:     Patient to be seen daily to address the above listed problems via self-care/home management, therapeutic activities, therapeutic exercises  · Plan of Care Expires: 08/29/20  · Plan of Care Reviewed with: patient    Subjective     Pain/Comfort:  · Pain Rating 1: 8/10    Objective:     Communicated with: RN prior to session.  Patient found up in chair with cervical collar, hemovac, telemetry upon OT entry to room.    General Precautions: Standard, fall   Orthopedic Precautions:spinal precautions   Braces: Aspen collar     Occupational Performance:     Bed Mobility:    · Patient completed Sit to Supine with minimum assistance     Functional Mobility/Transfers:  · Patient completed Sit <> Stand Transfer with minimum assistance  with  rolling walker   · Patient completed Bed <> Chair  Transfer using Stand Pivot technique with minimum assistance with rolling walker  · Patient completed Toilet Transfer Stand Pivot technique with minimum assistance with  rolling walker and bedside commode  · Functional Mobility: Pt was able to walk from chair to bathroom c CGA and RW.    Activities of Daily Living:  · Upper Body Dressing: maximal assistance to don/[facility] gown.  · Toileting: minimum assistance for toilet hygiene while sitting on toilet.      [facility] 6 Click ADL: 14    Patient left supine with all lines intact, call button in reach and RN notifiedEducation:      GOALS:   Multidisciplinary Problems     Occupational Therapy Goals        Problem: Occupational Therapy Goal    Goal Priority Disciplines Outcome Interventions   Occupational Therapy Goal     OT, PT/OT Ongoing, Progressing    Description: Goals to be met by: 8/12/2020     Patient will increase functional independence with ADLs by performing:    UE Dressing with Stand-by Assistance.  LE Dressing with Minimal Assistance and AE PRN.  Grooming while standing with Supervision.  Toileting from toilet with Stand-by Assistance for hygiene and clothing management.   Supine to sit with Supervision in preparation for EOB/OOB functional activities.  Toilet transfer to toilet with Supervision and LRAD PRN.                     Time Tracking:     OT Date of Treatment: 07/30/20  OT Start Time: 1445  OT Stop Time: 1523  OT Total Time (min): 38 min    Billable Minutes:Self Care/Home Management 28  Therapeutic Activity 10    JUANA Au  7/30/2020

## 2020-07-30 NOTE — PLAN OF CARE
Pt oriented to room and unit. Bed in lowest position with siderails up x2. Call bell within reach; pt instructed to call for assistance.  Pt updated with POC.  Will continue to monitor.

## 2020-07-30 NOTE — ASSESSMENT & PLAN NOTE
Elmer Cadena Gissell is a 64 y.o. male with cervical stenosis s/p PCF C3-C6 (7/28/20)    Pain control: multimodal  PT/OT: WBAT BLE  DVT PPx:will restart brilinta today, SCDs at all times when not ambulating  Abx: postop Ancef  Labs: Hg 12.2  Drain: drain with 120 cc outpt- continue to gravity  C collar at all times    Dispo:pending pain control and mobilization

## 2020-07-30 NOTE — PLAN OF CARE
Patient AAOX4, call light and belongings in reach, siderails up x3, scds on and operating.  Surgical dressing intact, hemovac in place output charted, pain mildly controlled with current regimen.  Tolerating diet with no complaints of n/v.  Patient remains free from falls and safety maintained this shift

## 2020-07-30 NOTE — PROGRESS NOTES
[facility]  Orthopedics  Progress Note    Patient Name: Elmer Borrero  MRN: [id number]  Admission Date: 7/28/2020  Hospital Length of Stay: 2 days  Attending Provider: Abraham Dang MD  Primary Care Provider: Dae Romo MD  Follow-up For: Procedure(s) (LRB):  LAMINECTOMY, SPINE, CERVICAL, WITH POSTERIOR FUSION C3-6 Depuy SNS:Motors/SSEP/EMG Old Jer + Pads + tongs  (N/A)    Post-Operative Day: 2 Days Post-Op  Subjective:     Principal Problem:Cervical stenosis of spine    Principal Orthopedic Problem: s/p PCF C3-C6    Interval History: NAEON. Pt reports pain in left shoulder. HTN overnight, given 1x dose of lopressor. Pt reports -150 at home. Denies numbness/tingling. 30 ft with PT/OT yest.   Review of patient's allergies indicates:   Allergen Reactions    Ace inhibitors Other (See Comments)     cough       Current Facility-Administered Medications   Medication    0.9%  NaCl infusion    diphenhydrAMINE capsule 25 mg    docusate sodium capsule 50 mg    famotidine tablet 20 mg    finasteride tablet 5 mg    telmisartan tablet 80 mg    And    hydroCHLOROthiazide tablet 25 mg    methocarbamoL tablet 750 mg    metoprolol succinate (TOPROL-XL) 24 hr split tablet 12.5 mg    morphine injection 3 mg    ondansetron disintegrating tablet 8 mg    ondansetron injection 4 mg    oxyCODONE immediate release tablet 10 mg    oxyCODONE immediate release tablet 15 mg    oxyCODONE immediate release tablet 5 mg    polyethylene glycol packet 17 g    pregabalin capsule 75 mg    rosuvastatin tablet 20 mg    sodium chloride 0.9% flush 10 mL    sodium chloride 0.9% flush 5 mL    tamsulosin 24 hr capsule 0.4 mg     Objective:     Vital Signs (Most Recent):  Temp: 98 °F (36.7 °C) (07/30/20 0025)  Pulse: 79 (07/30/20 0700)  Resp: 16 (07/30/20 0700)  BP: (!) 163/84 (07/30/20 0700)  SpO2: 96 % (07/30/20 0700) Vital Signs (24h Range):  Temp:  [96.8 °F (36 °C)-98.4 °F (36.9 °C)] 98 °F  "(36.7 °C)  Pulse:  [79-94] 79  Resp:  [16-21] 16  SpO2:  [95 %-98 %] 96 %  BP: (143-196)/(74-90) 163/84     Weight: 126.6 kg (279 lb 1.6 oz)  Height: 5' 7" (170.2 cm)  Body mass index is 43.71 kg/m².      Intake/Output Summary (Last 24 hours) at 7/30/2020 0744  Last data filed at 7/30/2020 0200  Gross per 24 hour   Intake 550 ml   Output 1440 ml   Net -890 ml       Ortho/SPM Exam     C collar in place  Posterior cervical dressing c/d/i  hemovac drain to gravity with 140cc sanguinous output  Neuro exams table    Significant Labs:   CBC:   Recent Labs   Lab 07/28/20  1611 07/29/20  0504 07/30/20  0518   WBC 5.81 9.91 10.06   HGB 12.8* 12.1* 12.2*   HCT 38.4* 35.0* 37.7*    217 212     CMP:   Recent Labs   Lab 07/28/20  1611 07/29/20  0504 07/30/20  0518    135* 134*   K 3.8 3.9 3.6    102 97   CO2 22* 23 28   * 155* 105   BUN 14 15 12   CREATININE 0.8 0.9 0.8   CALCIUM 8.8 8.4* 9.1   PROT 6.8  --   --    ALBUMIN 3.8  --   --    BILITOT 0.4  --   --    ALKPHOS 54*  --   --    AST 28  --   --    ALT 36  --   --    ANIONGAP 10 10 9   EGFRNONAA >60.0 >60.0 >60.0     All pertinent labs within the past 24 hours have been reviewed.    Significant Imaging: I have reviewed and interpreted all pertinent imaging results/findings.    Assessment/Plan:     * Cervical stenosis of spine  Elmer Borrero is a 64 y.o. male with cervical stenosis s/p PCF C3-C6 (7/28/20)    Pain control: multimodal  PT/OT: WBAT BLE  DVT PPx:will restart brilinta today, SCDs at all times when not ambulating  Abx: postop Ancef  Labs: Hg 12.2  Drain: drain with 120 cc outpt- continue to gravity  C collar at all times    Dispo:pending pain control and mobilization            Jade Baker MD  Orthopedics  [facility]  "

## 2020-07-31 PROCEDURE — 11000001 HC ACUTE MED/SURG PRIVATE ROOM

## 2020-07-31 PROCEDURE — 99233 PR SUBSEQUENT HOSPITAL CARE,LEVL III: ICD-10-PCS | Mod: GC,,, | Performed by: HOSPITALIST

## 2020-07-31 PROCEDURE — 25000003 PHARM REV CODE 250: Performed by: STUDENT IN AN ORGANIZED HEALTH CARE EDUCATION/TRAINING PROGRAM

## 2020-07-31 PROCEDURE — 99900035 HC TECH TIME PER 15 MIN (STAT)

## 2020-07-31 PROCEDURE — 97530 THERAPEUTIC ACTIVITIES: CPT

## 2020-07-31 PROCEDURE — 99233 SBSQ HOSP IP/OBS HIGH 50: CPT | Mod: GC,,, | Performed by: HOSPITALIST

## 2020-07-31 PROCEDURE — 97535 SELF CARE MNGMENT TRAINING: CPT

## 2020-07-31 PROCEDURE — 63600175 PHARM REV CODE 636 W HCPCS: Performed by: STUDENT IN AN ORGANIZED HEALTH CARE EDUCATION/TRAINING PROGRAM

## 2020-07-31 RX ORDER — GABAPENTIN 400 MG/1
400 CAPSULE ORAL 3 TIMES DAILY
Status: DISCONTINUED | OUTPATIENT
Start: 2020-07-31 | End: 2020-08-02 | Stop reason: HOSPADM

## 2020-07-31 RX ORDER — SENNOSIDES 8.6 MG/1
8.6 TABLET ORAL DAILY PRN
Status: DISCONTINUED | OUTPATIENT
Start: 2020-07-31 | End: 2020-08-02 | Stop reason: HOSPADM

## 2020-07-31 RX ORDER — CARVEDILOL 6.25 MG/1
6.25 TABLET ORAL DAILY
Status: DISCONTINUED | OUTPATIENT
Start: 2020-08-01 | End: 2020-08-02

## 2020-07-31 RX ORDER — METHOCARBAMOL 750 MG/1
750 TABLET, FILM COATED ORAL 3 TIMES DAILY
Status: DISCONTINUED | OUTPATIENT
Start: 2020-07-31 | End: 2020-07-31

## 2020-07-31 RX ORDER — ACETAMINOPHEN 500 MG
1000 TABLET ORAL EVERY 6 HOURS
Status: DISCONTINUED | OUTPATIENT
Start: 2020-08-01 | End: 2020-08-02 | Stop reason: HOSPADM

## 2020-07-31 RX ORDER — METHOCARBAMOL 750 MG/1
750 TABLET, FILM COATED ORAL 4 TIMES DAILY
Status: DISCONTINUED | OUTPATIENT
Start: 2020-07-31 | End: 2020-08-02 | Stop reason: HOSPADM

## 2020-07-31 RX ADMIN — OXYCODONE HYDROCHLORIDE 15 MG: 10 TABLET ORAL at 03:07

## 2020-07-31 RX ADMIN — GABAPENTIN 400 MG: 400 CAPSULE ORAL at 09:07

## 2020-07-31 RX ADMIN — TAMSULOSIN HYDROCHLORIDE 0.4 MG: 0.4 CAPSULE ORAL at 10:07

## 2020-07-31 RX ADMIN — METHOCARBAMOL TABLETS 750 MG: 750 TABLET, COATED ORAL at 03:07

## 2020-07-31 RX ADMIN — DOCUSATE SODIUM 50 MG: 50 CAPSULE, LIQUID FILLED ORAL at 09:07

## 2020-07-31 RX ADMIN — FINASTERIDE 5 MG: 5 TABLET, FILM COATED ORAL at 10:07

## 2020-07-31 RX ADMIN — ACETAMINOPHEN 650 MG: 325 TABLET ORAL at 12:07

## 2020-07-31 RX ADMIN — FAMOTIDINE 20 MG: 20 TABLET, FILM COATED ORAL at 10:07

## 2020-07-31 RX ADMIN — DOCUSATE SODIUM 50 MG: 50 CAPSULE, LIQUID FILLED ORAL at 10:07

## 2020-07-31 RX ADMIN — TICAGRELOR 90 MG: 90 TABLET ORAL at 10:07

## 2020-07-31 RX ADMIN — GABAPENTIN 400 MG: 400 CAPSULE ORAL at 03:07

## 2020-07-31 RX ADMIN — TICAGRELOR 90 MG: 90 TABLET ORAL at 09:07

## 2020-07-31 RX ADMIN — HYDROCHLOROTHIAZIDE 25 MG: 25 TABLET ORAL at 10:07

## 2020-07-31 RX ADMIN — ROSUVASTATIN CALCIUM 20 MG: 10 TABLET, FILM COATED ORAL at 09:07

## 2020-07-31 RX ADMIN — ACETAMINOPHEN 650 MG: 325 TABLET ORAL at 05:07

## 2020-07-31 RX ADMIN — OXYCODONE HYDROCHLORIDE 15 MG: 10 TABLET ORAL at 05:07

## 2020-07-31 RX ADMIN — ACETAMINOPHEN 1000 MG: 325 TABLET ORAL at 11:07

## 2020-07-31 RX ADMIN — OXYCODONE HYDROCHLORIDE 15 MG: 10 TABLET ORAL at 11:07

## 2020-07-31 RX ADMIN — TELMISARTAN 80 MG: 20 TABLET ORAL at 10:07

## 2020-07-31 RX ADMIN — OXYCODONE HYDROCHLORIDE 15 MG: 10 TABLET ORAL at 07:07

## 2020-07-31 RX ADMIN — METOPROLOL SUCCINATE 12.5 MG: 25 TABLET, EXTENDED RELEASE ORAL at 10:07

## 2020-07-31 RX ADMIN — MORPHINE SULFATE 3 MG: 2 INJECTION, SOLUTION INTRAMUSCULAR; INTRAVENOUS at 01:07

## 2020-07-31 RX ADMIN — FAMOTIDINE 20 MG: 20 TABLET, FILM COATED ORAL at 09:07

## 2020-07-31 RX ADMIN — METHOCARBAMOL TABLETS 750 MG: 750 TABLET, COATED ORAL at 09:07

## 2020-07-31 RX ADMIN — MORPHINE SULFATE 3 MG: 2 INJECTION, SOLUTION INTRAMUSCULAR; INTRAVENOUS at 06:07

## 2020-07-31 RX ADMIN — ONDANSETRON 4 MG: 2 INJECTION INTRAMUSCULAR; INTRAVENOUS at 02:07

## 2020-07-31 RX ADMIN — OXYCODONE HYDROCHLORIDE 15 MG: 10 TABLET ORAL at 12:07

## 2020-07-31 RX ADMIN — GABAPENTIN 300 MG: 300 CAPSULE ORAL at 10:07

## 2020-07-31 NOTE — SUBJECTIVE & OBJECTIVE
Past Medical History:   Diagnosis Date    Actinic keratosis     Arthritis     BPH (benign prostatic hyperplasia)     Cataract     Coronary artery disease     Fatty liver     Heart attack     Hyperlipidemia     Hypertension     Kidney stone     Morbid obesity with BMI of 40.0-44.9, adult 10/2/2019    Obesity (BMI 30-39.9) 10/2/2019    GEOVANNA on CPAP     Renal calculi     Subclinical hypothyroidism        Past Surgical History:   Procedure Laterality Date    JOINT REPLACEMENT      left knee    KNEE SURGERY      LEFT HEART CATHETERIZATION Left 11/27/2019    Procedure: Left heart cath;  Surgeon: Dejan Nielsen MD;  Location: [facility] CATH;  Service: Cardiovascular;  Laterality: Left;    PERCUTANEOUS CRYOTHERAPY OF PERIPHERAL NERVE USING LIQUID NITROUS OXIDE IN CLOSED NEEDLE DEVICE Right 8/26/2019    Procedure: CRYOTHERAPY, NERVE, PERIPHERAL, PERCUTANEOUS, USING LIQUID NITROUS OXIDE IN CLOSED NEEDLE DEVICE;  Surgeon: CHYNA Quevedo;  Location: [facility] CATH LAB;  Service: Pain Management;  Laterality: Right;  Right Knee IOVERA    POSTERIOR FUSION OF CERVICAL SPINE WITH LAMINECTOMY N/A 7/28/2020    Procedure: LAMINECTOMY, SPINE, CERVICAL, WITH POSTERIOR FUSION C3-6 Depuy SNS:Motors/SSEP/EMG Old Jer + Pads + tongs ;  Surgeon: Abraham Dang MD;  Location: [address];  Service: Orthopedics;  Laterality: N/A;    right knee replacement       SPINE SURGERY      around 2010     TOTAL KNEE ARTHROPLASTY Right 9/5/2019    Procedure: ARTHROPLASTY, KNEE, TOTAL-VINCENZO-SAME DAY;  Surgeon: Arturo Schultz MD;  Location: [facility] OR [address] [place] [address];  Service: Orthopedics;  Laterality: Right;       Review of patient's allergies indicates:   Allergen Reactions    Ace inhibitors Other (See Comments)     cough       No current facility-administered medications on file prior to encounter.      Current Outpatient Medications on File Prior to Encounter   Medication Sig    allopurinol (ZYLOPRIM) 300 MG tablet  Take 1 tablet (300 mg total) by mouth once daily. (Patient taking differently: Take 300 mg by mouth once daily. Take if needed)    finasteride (PROSCAR) 5 mg tablet TAKE 1 TABLET BY MOUTH EVERY DAY (Patient taking differently: Take in am of surgery)    loratadine (CLARITIN) 10 mg tablet Take 1 tablet by mouth daily as needed. Take if needed    metoprolol succinate (TOPROL-XL) 25 MG 24 hr tablet Take 0.5 tablets (12.5 mg total) by mouth once daily. (Patient taking differently: Take 12.5 mg by mouth once daily. Take in am of surgery)    montelukast (SINGULAIR) 10 mg tablet TAKE 1 TABLET EVERY EVENING (Patient taking differently: Takes at night)    multivitamin capsule Take 1 capsule by mouth once daily. Hold for 1 week prior to surgery    rosuvastatin (CRESTOR) 20 MG tablet Take 1 tablet (20 mg total) by mouth every evening. (Patient taking differently: Take 20 mg by mouth every evening. Takes at night)    tamsulosin (FLOMAX) 0.4 mg Cap Take 1 capsule (0.4 mg total) by mouth once daily. (Patient taking differently: Take 1 capsule by mouth once daily. Take in am of surgery)    telmisartan-hydrochlorothiazide (MICARDIS HCT) 80-25 mg per tablet TAKE 1 TABLET BY MOUTH EVERY DAY (Patient taking differently: Hold am of surgery)    ticagrelor (BRILINTA) 90 mg tablet Take 1 tablet (90 mg total) by mouth 2 (two) times daily. (Patient taking differently: Take 90 mg by mouth 2 (two) times daily. Stopped per Dr Nielsen, last dose was 7/18/20.)    cyclobenzaprine (FLEXERIL) 5 MG tablet Take 1 tablet by mouth nightly. Take if needed     Family History     Problem Relation (Age of Onset)    Cancer Paternal Grandfather    Heart disease Father, Brother    Hyperlipidemia Mother    Hypertension Father, Mother, Sister        Tobacco Use    Smoking status: Never Smoker    Smokeless tobacco: Never Used   Substance and Sexual Activity    Alcohol use: Yes     Alcohol/week: 14.0 standard drinks     Types: 14 Cans of beer per week      Frequency: 4 or more times a week     Drinks per session: 3 or 4     Binge frequency: Weekly    Drug use: No    Sexual activity: Yes     Partners: Female     Review of Systems   Constitutional: Negative for activity change, appetite change, chills, diaphoresis, fatigue and fever.   Respiratory: Negative for apnea, cough, choking, chest tightness, shortness of breath, wheezing and stridor.    Cardiovascular: Negative for chest pain, palpitations and leg swelling.   Gastrointestinal: Positive for constipation. Negative for abdominal distention, abdominal pain, anal bleeding, blood in stool, diarrhea, nausea and vomiting.   Genitourinary: Negative.    Musculoskeletal: Positive for arthralgias (L shoulder), neck pain and neck stiffness.   Skin: Negative.    Neurological: Positive for dizziness (mild dizziness withe hypertensive episodes ). Negative for tremors, seizures, syncope, facial asymmetry, speech difficulty, weakness, light-headedness, numbness and headaches.   Psychiatric/Behavioral: Negative.      Objective:     Vital Signs (Most Recent):  Temp: 97 °F (36.1 °C) (07/31/20 0834)  Pulse: 76 (07/31/20 0834)  Resp: 18 (07/31/20 0645)  BP: (!) 187/88 (07/31/20 0834)  SpO2: 98 % (07/31/20 0834) Vital Signs (24h Range):  Temp:  [97 °F (36.1 °C)-99.3 °F (37.4 °C)] 97 °F (36.1 °C)  Pulse:  [76-87] 76  Resp:  [14-20] 18  SpO2:  [95 %-98 %] 98 %  BP: (162-187)/(81-93) 187/88     Weight: 126.6 kg (279 lb 1.6 oz)  Body mass index is 43.71 kg/m².    Physical Exam  Vitals signs reviewed.   Constitutional:       General: He is not in acute distress.     Appearance: Normal appearance. He is obese. He is not ill-appearing, toxic-appearing or diaphoretic.   HENT:      Head: Normocephalic and atraumatic.   Cardiovascular:      Rate and Rhythm: Normal rate and regular rhythm.      Pulses: Normal pulses.      Heart sounds: Normal heart sounds. No murmur. No friction rub. No gallop.    Pulmonary:      Effort: Pulmonary effort  is normal. No respiratory distress.      Breath sounds: Normal breath sounds. No stridor. No wheezing, rhonchi or rales.   Chest:      Chest wall: No tenderness.   Abdominal:      General: Abdomen is flat. Bowel sounds are normal. There is distension.      Palpations: Abdomen is soft.      Tenderness: There is no abdominal tenderness. There is no guarding or rebound.   Musculoskeletal:         General: Tenderness (L neck and shoulder) present. No swelling.      Right lower leg: No edema.      Left lower leg: No edema.   Skin:     General: Skin is warm and dry.      Coloration: Skin is not jaundiced or pale.      Findings: No bruising, erythema, lesion or rash.   Neurological:      General: No focal deficit present.      Mental Status: He is alert and oriented to person, place, and time. Mental status is at baseline.   Psychiatric:         Mood and Affect: Mood normal.         Behavior: Behavior normal.         Thought Content: Thought content normal.         Judgment: Judgment normal.         Significant Labs:   CBC:   Recent Labs   Lab 07/30/20  0518   WBC 10.06   HGB 12.2*   HCT 37.7*        CMP:   Recent Labs   Lab 07/30/20  0518   *   K 3.6   CL 97   CO2 28      BUN 12   CREATININE 0.8   CALCIUM 9.1   ANIONGAP 9   EGFRNONAA >60.0       Significant Imaging: I have reviewed all pertinent imaging results/findings within the past 24 hours.

## 2020-07-31 NOTE — SUBJECTIVE & OBJECTIVE
"Principal Problem:Cervical stenosis of spine    Principal Orthopedic Problem: s/p PCF C3-C6    Interval History: NAEON.Pt reports pain and discomfort this am. HTN overnight. Denies new numbness and tingling. <5cc drain outpt.   Review of patient's allergies indicates:   Allergen Reactions    Ace inhibitors Other (See Comments)     cough       Current Facility-Administered Medications   Medication    0.9%  NaCl infusion    acetaminophen tablet 650 mg    diphenhydrAMINE capsule 25 mg    docusate sodium capsule 50 mg    famotidine tablet 20 mg    finasteride tablet 5 mg    gabapentin capsule 300 mg    telmisartan tablet 80 mg    And    hydroCHLOROthiazide tablet 25 mg    methocarbamoL tablet 750 mg    metoprolol succinate (TOPROL-XL) 24 hr split tablet 12.5 mg    metoprolol succinate (TOPROL-XL) 24 hr split tablet 12.5 mg    morphine injection 3 mg    ondansetron disintegrating tablet 8 mg    ondansetron injection 4 mg    oxyCODONE immediate release tablet 10 mg    oxyCODONE immediate release tablet 15 mg    oxyCODONE immediate release tablet 5 mg    polyethylene glycol packet 17 g    rosuvastatin tablet 20 mg    sodium chloride 0.9% flush 10 mL    sodium chloride 0.9% flush 5 mL    tamsulosin 24 hr capsule 0.4 mg    ticagrelor tablet 90 mg     Objective:     Vital Signs (Most Recent):  Temp: 98.5 °F (36.9 °C) (07/31/20 0543)  Pulse: 79 (07/31/20 0543)  Resp: 18 (07/31/20 0645)  BP: (!) 176/82 (07/31/20 0543)  SpO2: 96 % (07/31/20 0543) Vital Signs (24h Range):  Temp:  [97.4 °F (36.3 °C)-99.3 °F (37.4 °C)] 98.5 °F (36.9 °C)  Pulse:  [78-87] 79  Resp:  [14-20] 18  SpO2:  [95 %-97 %] 96 %  BP: (151-176)/(81-93) 176/82     Weight: 126.6 kg (279 lb 1.6 oz)  Height: 5' 7" (170.2 cm)  Body mass index is 43.71 kg/m².      Intake/Output Summary (Last 24 hours) at 7/31/2020 0732  Last data filed at 7/31/2020 0400  Gross per 24 hour   Intake 1220 ml   Output 1480 ml   Net -260 ml       Ortho/SPM Exam   "   C collar in place  Posterior cervical dressing c/d/i  hemovac drain to gravity with <5cc sanguinous output  Neuro exams table    Significant Labs:   CBC:   Recent Labs   Lab 07/30/20  0518   WBC 10.06   HGB 12.2*   HCT 37.7*        CMP:   Recent Labs   Lab 07/30/20 0518   *   K 3.6   CL 97   CO2 28      BUN 12   CREATININE 0.8   CALCIUM 9.1   ANIONGAP 9   EGFRNONAA >60.0     All pertinent labs within the past 24 hours have been reviewed.    Significant Imaging: I have reviewed and interpreted all pertinent imaging results/findings.

## 2020-07-31 NOTE — PROGRESS NOTES
[facility]  Orthopedics  Progress Note    Patient Name: Elmer Borrero  MRN: [id number]  Admission Date: 7/28/2020  Hospital Length of Stay: 3 days  Attending Provider: Abraham Dang MD  Primary Care Provider: Dae Romo MD  Follow-up For: Procedure(s) (LRB):  LAMINECTOMY, SPINE, CERVICAL, WITH POSTERIOR FUSION C3-6 Depuy SNS:Motors/SSEP/EMG Old Jer + Pads + tongs  (N/A)    Post-Operative Day: 3 Days Post-Op  Subjective:     Principal Problem:Cervical stenosis of spine    Principal Orthopedic Problem: s/p PCF C3-C6    Interval History: NAEON.Pt reports pain and discomfort this am. HTN overnight. Denies new numbness and tingling. <5cc drain outpt.   Review of patient's allergies indicates:   Allergen Reactions    Ace inhibitors Other (See Comments)     cough       Current Facility-Administered Medications   Medication    0.9%  NaCl infusion    acetaminophen tablet 650 mg    diphenhydrAMINE capsule 25 mg    docusate sodium capsule 50 mg    famotidine tablet 20 mg    finasteride tablet 5 mg    gabapentin capsule 300 mg    telmisartan tablet 80 mg    And    hydroCHLOROthiazide tablet 25 mg    methocarbamoL tablet 750 mg    metoprolol succinate (TOPROL-XL) 24 hr split tablet 12.5 mg    metoprolol succinate (TOPROL-XL) 24 hr split tablet 12.5 mg    morphine injection 3 mg    ondansetron disintegrating tablet 8 mg    ondansetron injection 4 mg    oxyCODONE immediate release tablet 10 mg    oxyCODONE immediate release tablet 15 mg    oxyCODONE immediate release tablet 5 mg    polyethylene glycol packet 17 g    rosuvastatin tablet 20 mg    sodium chloride 0.9% flush 10 mL    sodium chloride 0.9% flush 5 mL    tamsulosin 24 hr capsule 0.4 mg    ticagrelor tablet 90 mg     Objective:     Vital Signs (Most Recent):  Temp: 98.5 °F (36.9 °C) (07/31/20 0543)  Pulse: 79 (07/31/20 0543)  Resp: 18 (07/31/20 0645)  BP: (!) 176/82 (07/31/20 0543)  SpO2: 96 % (07/31/20 0543)  "Vital Signs (24h Range):  Temp:  [97.4 °F (36.3 °C)-99.3 °F (37.4 °C)] 98.5 °F (36.9 °C)  Pulse:  [78-87] 79  Resp:  [14-20] 18  SpO2:  [95 %-97 %] 96 %  BP: (151-176)/(81-93) 176/82     Weight: 126.6 kg (279 lb 1.6 oz)  Height: 5' 7" (170.2 cm)  Body mass index is 43.71 kg/m².      Intake/Output Summary (Last 24 hours) at 7/31/2020 0732  Last data filed at 7/31/2020 0400  Gross per 24 hour   Intake 1220 ml   Output 1480 ml   Net -260 ml       Ortho/SPM Exam     C collar in place  Posterior cervical dressing c/d/i  hemovac drain to gravity with <5cc sanguinous output  Neuro exams table    Significant Labs:   CBC:   Recent Labs   Lab 07/30/20  0518   WBC 10.06   HGB 12.2*   HCT 37.7*        CMP:   Recent Labs   Lab 07/30/20  0518   *   K 3.6   CL 97   CO2 28      BUN 12   CREATININE 0.8   CALCIUM 9.1   ANIONGAP 9   EGFRNONAA >60.0     All pertinent labs within the past 24 hours have been reviewed.    Significant Imaging: I have reviewed and interpreted all pertinent imaging results/findings.    Assessment/Plan:     * Cervical stenosis of spine  Elmer Borrero is a 64 y.o. male with cervical stenosis s/p PCF C3-C6 (7/28/20)    Pain control: multimodal  PT/OT: WBAT BLE  DVT PPx:will restart brilinta today, SCDs at all times when not ambulating  Abx: postop Ancef  Labs: no new labs  Drain: drain with <5cc outpt- pulled this am, post drain XR  C collar at all times  Medicine consulted for assistance with BP, appreciate assistance  Dispo:pending pain control and mobilization            Jade Baker MD  Orthopedics  [facility]  "

## 2020-07-31 NOTE — PLAN OF CARE
Problem: Physical Therapy Goal  Goal: Physical Therapy Goal  Description: Goals to be met by: 20    Patient will increase functional independence with mobility by performin. Supine to sit with Set-up [place]  2. Sit to supine with Set-up [place]  3. Sit to stand transfer with Supervision  4. Gait  x 150 feet with Supervision using Rolling Walker.   5.  Patient will demonstrate independence with a home exercise program  6. Patient's balance will be GOOD-: Needs SUPERVISION only during gait and able to self right with moderate LOB inconsistently.  Outcome: Ongoing, Progressing     Patient progressing appropriately towards current goals and plan of care remains appropriate at this time. Patient continues to demonstrate decreased activity tolerance secondary to pain but with better pain control than previous day.  Patient observed sitting in chair about to transfer to bed with RN.  Patient ambulated ~ 8 ft with rolling walker and CGA with a decreased distance secondary to pain.  Patient requires between CGA and min A for bed mobility transfers and ambulation.  Patient continues to benefit from skilled PT for strengthening and mobility training.      Enzo Verduzco PT, DPT  2020  Pager #: [phone]

## 2020-07-31 NOTE — PT/OT/SLP PROGRESS
Occupational Therapy   Treatment    Name: Elmer Borrero  MRN: [id number]  Admitting Diagnosis:  Cervical stenosis of spine  3 Days Post-Op    Recommendations:     Discharge Recommendations: outpatient OT  Discharge Equipment Recommendations:  none  Barriers to discharge:  None    Assessment:     Elmer Borrero is a 64 y.o. male with a medical diagnosis of Cervical stenosis of spine.  He was able to perform supine/sit, sit/stand, toilet T/F, and bed/chair T/F c CGA and RW.  Pt performed toilet hygiene and grooming c set-up.  After walking pt had c/o light headedness upon reaching chair and after walking to bathroom.  Pt is progressing well and had fewer c/o pain and shooting pain today.  Was noted to have c-collar on upside down upon arrival and C-collar placed properly and family educated on c-collar wear/care schedule. Performance deficits affecting function are weakness, impaired endurance, impaired self care skills, impaired functional mobilty, decreased upper extremity function, decreased ROM, impaired coordination, impaired fine motor, orthopedic precautions.     Rehab Prognosis:  Good; patient would benefit from acute skilled OT services to address these deficits and reach maximum level of function.       Plan:     Patient to be seen daily to address the above listed problems via self-care/home management, therapeutic activities, therapeutic exercises  · Plan of Care Expires: 08/29/20  · Plan of Care Reviewed with: patient    Subjective     Pain/Comfort:  · Pain Rating 1: (Pt did not rate)    Objective:     Communicated with: RN prior to session.  Patient found supine with cervical collar, telemetry upon OT entry to room.    General Precautions: Standard, fall   Orthopedic Precautions:spinal precautions   Braces: Aspen collar     Occupational Performance:     Bed Mobility:    · Patient completed Supine to Sit with minimum assistance     Functional Mobility/Transfers:  · Patient completed Sit <>  Stand Transfer with contact guard assistance  with  rolling walker   · Patient completed Bed <> Chair Transfer using Stand Pivot technique with contact guard assistance with rolling walker  · Patient completed Toilet Transfer Stand Pivot technique with contact guard assistance with  rolling walker  · Functional Mobility: Pt was able to walk to bathroom c CGA and RW.    Activities of Daily Living:  · Grooming: stand by assistance to wash hands while standing at sink.  · Toileting: contact guard assistance for toilet hygiene.      [facility] 6 Click ADL: 16    Patient left up in chair with all lines intact, call button in reach, RN notified and wife presentEducation:      GOALS:   Multidisciplinary Problems     Occupational Therapy Goals        Problem: Occupational Therapy Goal    Goal Priority Disciplines Outcome Interventions   Occupational Therapy Goal     OT, PT/OT Ongoing, Progressing    Description: Goals to be met by: 8/12/2020     Patient will increase functional independence with ADLs by performing:    UE Dressing with Stand-by Assistance.  LE Dressing with Minimal Assistance and AE PRN.  Grooming while standing with Supervision.  Toileting from toilet with Stand-by Assistance for hygiene and clothing management.   Supine to sit with Supervision in preparation for EOB/OOB functional activities.  Toilet transfer to toilet with Supervision and LRAD PRN.                     Time Tracking:     OT Date of Treatment: 07/31/20  OT Start Time: 1340  OT Stop Time: 1416  OT Total Time (min): 36 min    Billable Minutes:Self Care/Home Management 18  Therapeutic Activity 18    JUANA Au  7/31/2020

## 2020-07-31 NOTE — PT/OT/SLP PROGRESS
"Physical Therapy Treatment    Patient Name:  Elmer Borrero   MRN:  [id number]    Recommendations:     Discharge Recommendations:  home with home health   Discharge Equipment Recommendations: none   Barriers to discharge: None    Assessment:     Elmer Borrero is a 64 y.o. male admitted with a medical diagnosis of Cervical stenosis of spine.  He presents with the following impairments/functional limitations:  weakness, impaired endurance, impaired self care skills, impaired functional mobilty, gait instability, decreased lower extremity function, decreased upper extremity function, pain, orthopedic precautions, impaired muscle length, decreased ROM, decreased coordination.      Patient continues to demonstrate decreased activity tolerance secondary to pain but with better pain control than previous day.  Patient observed sitting in chair about to transfer to bed with RN.  Patient ambulated ~ 8 ft with rolling walker and CGA with a decreased distance secondary to pain.  Patient requires between CGA and min A for bed mobility transfers and ambulation.  Patient continues to benefit from skilled PT for strengthening and mobility training.      Rehab Prognosis: Good; patient continues to benefit from acute skilled PT services to address these deficits and reach maximum level of function.  Patient remains most appropriate to discharge to home with home health  Recent Surgery: Procedure(s) (LRB):  LAMINECTOMY, SPINE, CERVICAL, WITH POSTERIOR FUSION C3-6 Depuy SNS:Motors/SSEP/EMG Old Jer + Pads + tongs  (N/A) 3 Days Post-Op    Plan:     During this hospitalization, patient to be seen daily to address the identified rehab impairments via gait training, therapeutic activities, therapeutic exercises, neuromuscular re-education and progress toward the following goals:    · Plan of Care Expires:  08/28/20    Subjective     Subjective: "I feel better than yesterday"   Pain/Comfort:  · Pain Rating 1: (did not " rate)      Objective:     Communicated with RN prior to session.  Patient found up in chair with cervical collar, telemetry upon PT entry to room.     General Precautions: Standard, fall   Orthopedic Precautions:spinal precautions   Braces: Aspen collar     Functional Mobility:  · Bed Mobility:     · Sit to Supine: minimum assistance for LE management and log rolling technique  · Transfers:     · Sit to Stand:  contact guard assistance with rolling walker  · Gait: Patient ambulated 8 ft with rolling walker and contact guard assistance.  · Balance:   · Sitting: GOOD: Maintains balance through MODERATE excursions of active trunk movement  · Standing: GOOD-: Needs SUPERVISION only during gait and able to self right with moderate LOB inconsistently      AM-PAC 6 CLICK MOBILITY  Turning over in bed (including adjusting bedclothes, sheets and blankets)?: 3  Sitting down on and standing up from a chair with arms (e.g., wheelchair, bedside commode, etc.): 3  Moving from lying on back to sitting on the side of the bed?: 3  Moving to and from a bed to a chair (including a wheelchair)?: 3  Need to walk in hospital room?: 3  Climbing 3-5 steps with a railing?: 3  Basic Mobility Total Score: 18       Therapeutic Activities and Exercises:   Patient provided cues for log rolling and spinal precautions to maintain safety.    Gait training:  Patient required cues for sequencing to increase independence and safety.  Patient required cues ~ 25% of the time.    Patient Education:    Patient educated on assistive device use, bed mobility training, Fall risk, gait training, home safety, Home exercise program, spinal surgery precautions and transfer training by explanation.  Patient was receptive to education and verbalizes understanding.     Patient left supine with all lines intact, call button in reach and RN present.    GOALS:   Multidisciplinary Problems     Physical Therapy Goals        Problem: Physical Therapy Goal    Goal  Priority Disciplines Outcome Goal Variances Interventions   Physical Therapy Goal     PT, PT/OT Ongoing, Progressing     Description: Goals to be met by: 20    Patient will increase functional independence with mobility by performin. Supine to sit with Set-up [place]  2. Sit to supine with Set-up [place]  3. Sit to stand transfer with Supervision  4. Gait  x 150 feet with Supervision using Rolling Walker.   5.  Patient will demonstrate independence with a home exercise program  6. Patient's balance will be GOOD-: Needs SUPERVISION only during gait and able to self right with moderate LOB inconsistently.                   Time Tracking:     PT Received On: 20  PT Start Time: 1431     PT Stop Time: 1445  PT Total Time (min): 14 min     Billable Minutes: Therapeutic Activity 14 min    Treatment Type: Treatment  PT/PTA: PT     PTA Visit Number: 0     Enzo Verduzco, PT  2020

## 2020-07-31 NOTE — ASSESSMENT & PLAN NOTE
Elmer Borrero is a 64 y.o. male with cervical stenosis s/p PCF C3-C6 (7/28/20)    Pain control: multimodal  PT/OT: WBAT BLE  DVT PPx:will restart brilinta today, SCDs at all times when not ambulating  Abx: postop Ancef  Labs: no new labs  Drain: drain with <5cc outpt- pulled this am, post drain XR  C collar at all times  Medicine consulted for assistance with BP, appreciate assistance  Dispo:pending pain control and mobilization

## 2020-07-31 NOTE — PLAN OF CARE
Problem: Occupational Therapy Goal  Goal: Occupational Therapy Goal  Description: Goals to be met by: 8/12/2020     Patient will increase functional independence with ADLs by performing:    UE Dressing with Stand-by Assistance.  LE Dressing with Minimal Assistance and AE PRN.  Grooming while standing with Supervision.  Toileting from toilet with Stand-by Assistance for hygiene and clothing management.   Supine to sit with Supervision in preparation for EOB/OOB functional activities.  Toilet transfer to toilet with Supervision and LRAD PRN.    Outcome: Ongoing, Progressing

## 2020-07-31 NOTE — CONSULTS
Pt seen and examined with attending. Consult note to follow.     Kyle Padgett MD  Resident Physician, PGY-1  [email or web address]  Pager: [phone]

## 2020-07-31 NOTE — ASSESSMENT & PLAN NOTE
Pt is severe pain that unmanaged with current pain control agents. BP at home in 140s-150s. Home Rx: Temisartan 80 mg + HCTZ 25 mg and Metoprolol XL 12.5 mg.     PLAN:  -- Discontinue Metoprolol XL and start Carvedilol 6.25 mg daily  -- Optimize pain management agents and consider scheduled agents as well as PRN. Would recommend increasing tylenol dose to 1000mg Q6h, Scheduling the methocarbamol 750 mg Q8H, and adding a lidocain patch to the pt's regimen   -- Consider scheduling long acting opiates if pain is still not controlled  -- Schedule Senna to help with constipation and a trial of suppository tonight

## 2020-07-31 NOTE — HPI
64 y.o. M w/ pmh of CAD, HTN, HLD, and OSAS on CPAP, 3 days out of a laminectomy and a posterior cervical fusion of C3-C6.     Pt presented w/ occasional left arm pain. The pain has been present since discharge from the hospital 11/19. Pt described  pain as aching and intermittent.  Pain started in the low back.  He started applying heat and the pain improved. Neck pain stared after. Back pain is intermittently present. Pain is worse with activity and improved by heat. There is no associated numbness and tingling. There is subjective weakness. Pt reports decreased  strength and difficulty with buttons. Prior treatments have included tylenol, but no PT, ESIs or recent surgery. He did have lumbar ESIs prior to his surgery in 2015.  Pt was seen by ortho would recommended the procedure. Pt has been doing well since surgery but he complains of severe pain in his left shoulder and his blood pressure has been higher than his baseline.     Pt had a left heart catheterization for worsening angina in 11/19 and an RCA stent was placed. Pt is s/p knee replacement 9/2019.Pt mentions a history of unknown lumbar spine surgery in 2015 in [place].    Medicine is consulted for assistance with HTN management.

## 2020-08-01 PROCEDURE — 11000001 HC ACUTE MED/SURG PRIVATE ROOM

## 2020-08-01 PROCEDURE — 97535 SELF CARE MNGMENT TRAINING: CPT

## 2020-08-01 PROCEDURE — 97530 THERAPEUTIC ACTIVITIES: CPT | Mod: CQ

## 2020-08-01 PROCEDURE — 63600175 PHARM REV CODE 636 W HCPCS: Performed by: STUDENT IN AN ORGANIZED HEALTH CARE EDUCATION/TRAINING PROGRAM

## 2020-08-01 PROCEDURE — 97110 THERAPEUTIC EXERCISES: CPT | Mod: CQ

## 2020-08-01 PROCEDURE — 99232 PR SUBSEQUENT HOSPITAL CARE,LEVL II: ICD-10-PCS | Mod: GC,,, | Performed by: HOSPITALIST

## 2020-08-01 PROCEDURE — 25000003 PHARM REV CODE 250: Performed by: STUDENT IN AN ORGANIZED HEALTH CARE EDUCATION/TRAINING PROGRAM

## 2020-08-01 PROCEDURE — 99232 SBSQ HOSP IP/OBS MODERATE 35: CPT | Mod: GC,,, | Performed by: HOSPITALIST

## 2020-08-01 PROCEDURE — 25000003 PHARM REV CODE 250: Performed by: ORTHOPAEDIC SURGERY

## 2020-08-01 PROCEDURE — 97116 GAIT TRAINING THERAPY: CPT | Mod: CQ

## 2020-08-01 RX ORDER — GLYCERIN 1 G/1
1 SUPPOSITORY RECTAL ONCE
Status: DISCONTINUED | OUTPATIENT
Start: 2020-08-01 | End: 2020-08-02 | Stop reason: HOSPADM

## 2020-08-01 RX ORDER — BISACODYL 10 MG
10 SUPPOSITORY, RECTAL RECTAL DAILY PRN
Status: DISCONTINUED | OUTPATIENT
Start: 2020-08-01 | End: 2020-08-02 | Stop reason: HOSPADM

## 2020-08-01 RX ADMIN — METHOCARBAMOL TABLETS 750 MG: 750 TABLET, COATED ORAL at 05:08

## 2020-08-01 RX ADMIN — OXYCODONE HYDROCHLORIDE 15 MG: 10 TABLET ORAL at 03:08

## 2020-08-01 RX ADMIN — GABAPENTIN 400 MG: 400 CAPSULE ORAL at 03:08

## 2020-08-01 RX ADMIN — DIPHENHYDRAMINE HYDROCHLORIDE 25 MG: 25 CAPSULE ORAL at 02:08

## 2020-08-01 RX ADMIN — TAMSULOSIN HYDROCHLORIDE 0.4 MG: 0.4 CAPSULE ORAL at 08:08

## 2020-08-01 RX ADMIN — FINASTERIDE 5 MG: 5 TABLET, FILM COATED ORAL at 08:08

## 2020-08-01 RX ADMIN — TELMISARTAN 80 MG: 20 TABLET ORAL at 08:08

## 2020-08-01 RX ADMIN — MORPHINE SULFATE 3 MG: 2 INJECTION, SOLUTION INTRAMUSCULAR; INTRAVENOUS at 12:08

## 2020-08-01 RX ADMIN — ACETAMINOPHEN 1000 MG: 325 TABLET ORAL at 11:08

## 2020-08-01 RX ADMIN — GABAPENTIN 400 MG: 400 CAPSULE ORAL at 08:08

## 2020-08-01 RX ADMIN — BISACODYL 10 MG: 10 SUPPOSITORY RECTAL at 01:08

## 2020-08-01 RX ADMIN — OXYCODONE HYDROCHLORIDE 15 MG: 10 TABLET ORAL at 08:08

## 2020-08-01 RX ADMIN — OXYCODONE HYDROCHLORIDE 15 MG: 10 TABLET ORAL at 07:08

## 2020-08-01 RX ADMIN — TICAGRELOR 90 MG: 90 TABLET ORAL at 08:08

## 2020-08-01 RX ADMIN — MORPHINE SULFATE 3 MG: 2 INJECTION, SOLUTION INTRAMUSCULAR; INTRAVENOUS at 10:08

## 2020-08-01 RX ADMIN — METHOCARBAMOL TABLETS 750 MG: 750 TABLET, COATED ORAL at 08:08

## 2020-08-01 RX ADMIN — ROSUVASTATIN CALCIUM 20 MG: 10 TABLET, FILM COATED ORAL at 08:08

## 2020-08-01 RX ADMIN — CARVEDILOL 6.25 MG: 6.25 TABLET, FILM COATED ORAL at 08:08

## 2020-08-01 RX ADMIN — POLYETHYLENE GLYCOL 3350 17 G: 17 POWDER, FOR SOLUTION ORAL at 10:08

## 2020-08-01 RX ADMIN — ACETAMINOPHEN 1000 MG: 325 TABLET ORAL at 01:08

## 2020-08-01 RX ADMIN — METHOCARBAMOL TABLETS 750 MG: 750 TABLET, COATED ORAL at 01:08

## 2020-08-01 RX ADMIN — MORPHINE SULFATE 3 MG: 2 INJECTION, SOLUTION INTRAMUSCULAR; INTRAVENOUS at 09:08

## 2020-08-01 RX ADMIN — DOCUSATE SODIUM 50 MG: 50 CAPSULE, LIQUID FILLED ORAL at 08:08

## 2020-08-01 RX ADMIN — HYDROCHLOROTHIAZIDE 25 MG: 25 TABLET ORAL at 08:08

## 2020-08-01 RX ADMIN — FAMOTIDINE 20 MG: 20 TABLET, FILM COATED ORAL at 08:08

## 2020-08-01 RX ADMIN — ACETAMINOPHEN 1000 MG: 325 TABLET ORAL at 06:08

## 2020-08-01 RX ADMIN — OXYCODONE HYDROCHLORIDE 15 MG: 10 TABLET ORAL at 11:08

## 2020-08-01 NOTE — SUBJECTIVE & OBJECTIVE
Past Medical History:   Diagnosis Date    Actinic keratosis     Arthritis     BPH (benign prostatic hyperplasia)     Cataract     Coronary artery disease     Fatty liver     Heart attack     Hyperlipidemia     Hypertension     Kidney stone     Morbid obesity with BMI of 40.0-44.9, adult 10/2/2019    Obesity (BMI 30-39.9) 10/2/2019    GEOVANNA on CPAP     Renal calculi     Subclinical hypothyroidism        Past Surgical History:   Procedure Laterality Date    JOINT REPLACEMENT      left knee    KNEE SURGERY      LEFT HEART CATHETERIZATION Left 11/27/2019    Procedure: Left heart cath;  Surgeon: Dejan Nielsen MD;  Location: [facility] CATH;  Service: Cardiovascular;  Laterality: Left;    PERCUTANEOUS CRYOTHERAPY OF PERIPHERAL NERVE USING LIQUID NITROUS OXIDE IN CLOSED NEEDLE DEVICE Right 8/26/2019    Procedure: CRYOTHERAPY, NERVE, PERIPHERAL, PERCUTANEOUS, USING LIQUID NITROUS OXIDE IN CLOSED NEEDLE DEVICE;  Surgeon: CHYNA Quevedo;  Location: [facility] CATH LAB;  Service: Pain Management;  Laterality: Right;  Right Knee IOVERA    POSTERIOR FUSION OF CERVICAL SPINE WITH LAMINECTOMY N/A 7/28/2020    Procedure: LAMINECTOMY, SPINE, CERVICAL, WITH POSTERIOR FUSION C3-6 Depuy SNS:Motors/SSEP/EMG Old Jer + Pads + tongs ;  Surgeon: Abraham Dang MD;  Location: [address];  Service: Orthopedics;  Laterality: N/A;    right knee replacement       SPINE SURGERY      around 2010     TOTAL KNEE ARTHROPLASTY Right 9/5/2019    Procedure: ARTHROPLASTY, KNEE, TOTAL-VINCENZO-SAME DAY;  Surgeon: Arturo Schultz MD;  Location: [facility] OR [address] [place] [address];  Service: Orthopedics;  Laterality: Right;       Review of patient's allergies indicates:   Allergen Reactions    Ace inhibitors Other (See Comments)     cough       No current facility-administered medications on file prior to encounter.      Current Outpatient Medications on File Prior to Encounter   Medication Sig    allopurinol (ZYLOPRIM) 300 MG tablet  Take 1 tablet (300 mg total) by mouth once daily. (Patient taking differently: Take 300 mg by mouth once daily. Take if needed)    finasteride (PROSCAR) 5 mg tablet TAKE 1 TABLET BY MOUTH EVERY DAY (Patient taking differently: Take in am of surgery)    loratadine (CLARITIN) 10 mg tablet Take 1 tablet by mouth daily as needed. Take if needed    metoprolol succinate (TOPROL-XL) 25 MG 24 hr tablet Take 0.5 tablets (12.5 mg total) by mouth once daily. (Patient taking differently: Take 12.5 mg by mouth once daily. Take in am of surgery)    montelukast (SINGULAIR) 10 mg tablet TAKE 1 TABLET EVERY EVENING (Patient taking differently: Takes at night)    multivitamin capsule Take 1 capsule by mouth once daily. Hold for 1 week prior to surgery    rosuvastatin (CRESTOR) 20 MG tablet Take 1 tablet (20 mg total) by mouth every evening. (Patient taking differently: Take 20 mg by mouth every evening. Takes at night)    tamsulosin (FLOMAX) 0.4 mg Cap Take 1 capsule (0.4 mg total) by mouth once daily. (Patient taking differently: Take 1 capsule by mouth once daily. Take in am of surgery)    telmisartan-hydrochlorothiazide (MICARDIS HCT) 80-25 mg per tablet TAKE 1 TABLET BY MOUTH EVERY DAY (Patient taking differently: Hold am of surgery)    ticagrelor (BRILINTA) 90 mg tablet Take 1 tablet (90 mg total) by mouth 2 (two) times daily. (Patient taking differently: Take 90 mg by mouth 2 (two) times daily. Stopped per Dr Nielsen, last dose was 7/18/20.)    cyclobenzaprine (FLEXERIL) 5 MG tablet Take 1 tablet by mouth nightly. Take if needed     Family History     Problem Relation (Age of Onset)    Cancer Paternal Grandfather    Heart disease Father, Brother    Hyperlipidemia Mother    Hypertension Father, Mother, Sister        Tobacco Use    Smoking status: Never Smoker    Smokeless tobacco: Never Used   Substance and Sexual Activity    Alcohol use: Yes     Alcohol/week: 14.0 standard drinks     Types: 14 Cans of beer per week      Frequency: 4 or more times a week     Drinks per session: 3 or 4     Binge frequency: Weekly    Drug use: No    Sexual activity: Yes     Partners: Female     Review of Systems   Constitutional: Negative for activity change, appetite change, chills, diaphoresis, fatigue and fever.   Respiratory: Negative for apnea, cough, choking, chest tightness, shortness of breath, wheezing and stridor.    Cardiovascular: Negative for chest pain, palpitations and leg swelling.   Gastrointestinal: Positive for constipation. Negative for abdominal distention, abdominal pain, anal bleeding, blood in stool, diarrhea, nausea and vomiting.   Genitourinary: Negative.    Musculoskeletal: Positive for arthralgias (L shoulder), neck pain and neck stiffness.   Skin: Negative.    Neurological: Negative for dizziness (mild dizziness withe hypertensive episodes ), tremors, seizures, syncope, facial asymmetry, speech difficulty, weakness, light-headedness, numbness and headaches.   Psychiatric/Behavioral: Negative.      Objective:     Vital Signs (Most Recent):  Temp: 96.2 °F (35.7 °C) (08/01/20 1149)  Pulse: 80 (08/01/20 1149)  Resp: 14 (08/01/20 1149)  BP: (!) 194/89 (08/01/20 1149)  SpO2: 96 % (08/01/20 1149) Vital Signs (24h Range):  Temp:  [96.2 °F (35.7 °C)-98.6 °F (37 °C)] 96.2 °F (35.7 °C)  Pulse:  [73-84] 80  Resp:  [14-20] 14  SpO2:  [95 %-100 %] 96 %  BP: (165-194)/(81-97) 194/89     Weight: 126.6 kg (279 lb 1.6 oz)  Body mass index is 43.71 kg/m².    Physical Exam  Vitals signs reviewed.   Constitutional:       General: He is not in acute distress.     Appearance: Normal appearance. He is obese. He is not ill-appearing, toxic-appearing or diaphoretic.   HENT:      Head: Normocephalic and atraumatic.   Cardiovascular:      Rate and Rhythm: Normal rate and regular rhythm.      Pulses: Normal pulses.      Heart sounds: Normal heart sounds. No murmur. No friction rub. No gallop.    Pulmonary:      Effort: Pulmonary effort is  normal. No respiratory distress.      Breath sounds: Normal breath sounds. No stridor. No wheezing, rhonchi or rales.   Chest:      Chest wall: No tenderness.   Abdominal:      General: Abdomen is flat. Bowel sounds are normal. There is distension.      Palpations: Abdomen is soft.      Tenderness: There is no abdominal tenderness. There is no guarding or rebound.   Musculoskeletal:         General: Tenderness (L neck and shoulder) present. No swelling.      Right lower leg: No edema.      Left lower leg: No edema.   Skin:     General: Skin is warm and dry.      Coloration: Skin is not jaundiced or pale.      Findings: No bruising, erythema, lesion or rash.   Neurological:      General: No focal deficit present.      Mental Status: He is alert and oriented to person, place, and time. Mental status is at baseline.   Psychiatric:         Mood and Affect: Mood normal.         Behavior: Behavior normal.         Thought Content: Thought content normal.         Judgment: Judgment normal.         Significant Labs:   CBC:   No results for input(s): WBC, HGB, HCT, PLT in the last 48 hours.  CMP:   No results for input(s): NA, K, CL, CO2, GLU, BUN, CREATININE, CALCIUM, PROT, ALBUMIN, BILITOT, ALKPHOS, AST, ALT, ANIONGAP, EGFRNONAA in the last 48 hours.    Invalid input(s): ESTGFA[place]    Significant Imaging: I have reviewed all pertinent imaging results/findings within the past 24 hours.

## 2020-08-01 NOTE — SUBJECTIVE & OBJECTIVE
"Principal Problem:Cervical stenosis of spine    Principal Orthopedic Problem: s/p PCF C3-C6    Interval History: NAEON.Pt pain improving this AM. Hypertensive overnight systolic high of 180's. Complaining of c collar.  Review of patient's allergies indicates:   Allergen Reactions    Ace inhibitors Other (See Comments)     cough       Current Facility-Administered Medications   Medication    0.9%  NaCl infusion    acetaminophen tablet 1,000 mg    carvediloL tablet 6.25 mg    diphenhydrAMINE capsule 25 mg    docusate sodium capsule 50 mg    famotidine tablet 20 mg    finasteride tablet 5 mg    gabapentin capsule 400 mg    telmisartan tablet 80 mg    And    hydroCHLOROthiazide tablet 25 mg    methocarbamoL tablet 750 mg    metoprolol succinate (TOPROL-XL) 24 hr split tablet 12.5 mg    morphine injection 3 mg    ondansetron disintegrating tablet 8 mg    ondansetron injection 4 mg    oxyCODONE immediate release tablet 10 mg    oxyCODONE immediate release tablet 15 mg    oxyCODONE immediate release tablet 5 mg    polyethylene glycol packet 17 g    rosuvastatin tablet 20 mg    senna tablet 8.6 mg    sodium chloride 0.9% flush 10 mL    sodium chloride 0.9% flush 5 mL    tamsulosin 24 hr capsule 0.4 mg    ticagrelor tablet 90 mg     Objective:     Vital Signs (Most Recent):  Temp: 98.6 °F (37 °C) (08/01/20 0725)  Pulse: 79 (08/01/20 0725)  Resp: 20 (08/01/20 0844)  BP: (!) 165/86 (08/01/20 0725)  SpO2: 97 % (08/01/20 0725) Vital Signs (24h Range):  Temp:  [96.8 °F (36 °C)-98.6 °F (37 °C)] 98.6 °F (37 °C)  Pulse:  [73-84] 79  Resp:  [16-20] 20  SpO2:  [95 %-100 %] 97 %  BP: (165-177)/(81-97) 165/86     Weight: 126.6 kg (279 lb 1.6 oz)  Height: 5' 7" (170.2 cm)  Body mass index is 43.71 kg/m².      Intake/Output Summary (Last 24 hours) at 8/1/2020 0934  Last data filed at 7/31/2020 1350  Gross per 24 hour   Intake --   Output 1200 ml   Net -1200 ml       Ortho/SPM Exam   C collar in place  Posterior " cervical dressing c/d/i  Neuro exams table    Significant Labs:   All pertinent labs within the past 24 hours have been reviewed.    Significant Imaging: I have reviewed and interpreted all pertinent imaging results/findings.

## 2020-08-01 NOTE — ASSESSMENT & PLAN NOTE
Elmer Garciaxnayder is a 64 y.o. male with cervical stenosis s/p PCF C3-C6 (7/28/20)    Pain control: multimodal  PT/OT: WBAT BLE  DVT PPx:will restart brilinta today, SCDs at all times when not ambulating  Abx: postop Ancef done  Labs: no new labs  Drain: dced  C collar at all times  Medicine consulted, recommend dc metoprolol and started on carvidolol  Dispo:pending pain control, mobilization and control of HTN

## 2020-08-01 NOTE — PLAN OF CARE
Pt is AAOX4,VSS. BP range 160s-170s.Pt is progressing towards plan of care goals. Aspen collar in place.  Pain management has been assessed. Pt reports pain at a 7-10. PRN meds are given and pain is reassessed. SCDs in place with frequent checks for skin breakdown. Safety measures are in place bed in lowest position, wheels locked, call light within reach.

## 2020-08-01 NOTE — PT/OT/SLP PROGRESS
Occupational Therapy   Treatment    Name: Elmer Borrero  MRN: [id number]  Admitting Diagnosis:  Cervical stenosis of spine  4 Days Post-Op    Recommendations:     Discharge Recommendations: home with home health  Discharge Equipment Recommendations:  none  Barriers to discharge:       Assessment:     Elmer Borrero is a 64 y.o. male with a medical diagnosis of Cervical stenosis of spine. Patient required max encouragement during session. Patient required max cues for sitting upright in chair with c-collar donned. He reported shooting pain down right arm with any movement. He completed lower body dressing at min a with max verbal cuing and frequent rest breaks.   Performance deficits affecting function are weakness, impaired endurance, impaired self care skills, impaired functional mobilty, gait instability, impaired balance, decreased coordination, decreased upper extremity function, orthopedic precautions.     Rehab Prognosis:  Good; patient would benefit from acute skilled OT services to address these deficits and reach maximum level of function.       Plan:     Patient to be seen daily to address the above listed problems via therapeutic exercises, therapeutic activities, self-care/home management  · Plan of Care Expires: 08/29/20  · Plan of Care Reviewed with: patient, spouse    Subjective     Patient reported they said he was going home today but he can not go home today.     Pain/Comfort:  · Pain Rating 1: (reporting shooting pain with any movement but did not rate)  · Pain Addressed 1: Reposition, Distraction, Cessation of Activity    Objective:     Communicated with: RN prior to session.  Patient found in recliner with legs up with cervical collar upon OT entry to room.    General Precautions: Standard, fall   Orthopedic Precautions:spinal precautions   Braces: Aspen collar     Occupational Performance:     Functional Mobility/Transfers:  · Patient completed Sit <> Stand Transfer with contact  guard assistance  with  rolling walker from chair level     Activities of Daily Living:  · Lower Body Dressing: minimum assistance sitting in chair to don underwear, patient brought foot to opposite knee to don underwear, he required max verbal cues to completed task and sit upright for task     AMPAC 6 Click ADL: 17    Treatment & Education:  -Patient with difficulty with sitting upright in chair and maintaining position as he reported shooting pain down right arm   -Patient required max verbal encouragement for session   -Patient educated to bring legs to opposite knees for lower body dressing   -Patient educated on positioning of items for grooming and toilet task  -Patient educated on lifting restrictions   -Patient educated on bringing food to mouth from plate for feeding activities  -Patient educated on spinal precautions   -Patient educated on role of OT and plan of care     Patient left up in chair with all lines intact, call button in reach and wife and medial team  presentEducation:      GOALS:   Multidisciplinary Problems     Occupational Therapy Goals        Problem: Occupational Therapy Goal    Goal Priority Disciplines Outcome Interventions   Occupational Therapy Goal     OT, PT/OT Ongoing, Progressing    Description: Goals to be met by: 8/12/2020     Patient will increase functional independence with ADLs by performing:    UE Dressing with Stand-by Assistance.  LE Dressing with Minimal Assistance and AE PRN.  Grooming while standing with Supervision.  Toileting from toilet with Stand-by Assistance for hygiene and clothing management.   Supine to sit with Supervision in preparation for EOB/OOB functional activities.  Toilet transfer to toilet with Supervision and LRAD PRN.                     Time Tracking:     OT Date of Treatment: 08/01/20  OT Start Time: 1200  OT Stop Time: 1224  OT Total Time (min): 24 min    Billable Minutes:Self Care/Home Management 24    Renu Amaya OT  8/1/2020

## 2020-08-01 NOTE — ASSESSMENT & PLAN NOTE
Pt is severe pain that unmanaged with current pain control agents. BP at home in 140s-150s. Home Rx: Temisartan 80 mg + HCTZ 25 mg and Metoprolol XL 12.5 mg.     PLAN:  -- Continue Carvedilol 6.25 mg daily  -- Optimize pain management agents and consider scheduled agents as well as PRN. Would recommend  adding a lidocain patch and adding a scheduled opiate to the pt's regimen   -- Schedule Senna to help with constipation and a trial of suppository

## 2020-08-01 NOTE — PROGRESS NOTES
[facility]  Orthopedics  Progress Note    Patient Name: Elmer Borrero  MRN: [id number]  Admission Date: 7/28/2020  Hospital Length of Stay: 4 days  Attending Provider: Abraham Dang MD  Primary Care Provider: Dae Romo MD  Follow-up For: Procedure(s) (LRB):  LAMINECTOMY, SPINE, CERVICAL, WITH POSTERIOR FUSION C3-6 Depuy SNS:Motors/SSEP/EMG Old Jer + Pads + tongs  (N/A)    Post-Operative Day: 4 Days Post-Op  Subjective:     Principal Problem:Cervical stenosis of spine    Principal Orthopedic Problem: s/p PCF C3-C6    Interval History: NAEON.Pt pain improving this AM. Hypertensive overnight systolic high of 180's. Complaining of c collar.  Review of patient's allergies indicates:   Allergen Reactions    Ace inhibitors Other (See Comments)     cough       Current Facility-Administered Medications   Medication    0.9%  NaCl infusion    acetaminophen tablet 1,000 mg    carvediloL tablet 6.25 mg    diphenhydrAMINE capsule 25 mg    docusate sodium capsule 50 mg    famotidine tablet 20 mg    finasteride tablet 5 mg    gabapentin capsule 400 mg    telmisartan tablet 80 mg    And    hydroCHLOROthiazide tablet 25 mg    methocarbamoL tablet 750 mg    metoprolol succinate (TOPROL-XL) 24 hr split tablet 12.5 mg    morphine injection 3 mg    ondansetron disintegrating tablet 8 mg    ondansetron injection 4 mg    oxyCODONE immediate release tablet 10 mg    oxyCODONE immediate release tablet 15 mg    oxyCODONE immediate release tablet 5 mg    polyethylene glycol packet 17 g    rosuvastatin tablet 20 mg    senna tablet 8.6 mg    sodium chloride 0.9% flush 10 mL    sodium chloride 0.9% flush 5 mL    tamsulosin 24 hr capsule 0.4 mg    ticagrelor tablet 90 mg     Objective:     Vital Signs (Most Recent):  Temp: 98.6 °F (37 °C) (08/01/20 0725)  Pulse: 79 (08/01/20 0725)  Resp: 20 (08/01/20 0844)  BP: (!) 165/86 (08/01/20 0725)  SpO2: 97 % (08/01/20 0725) Vital Signs (24h  "Range):  Temp:  [96.8 °F (36 °C)-98.6 °F (37 °C)] 98.6 °F (37 °C)  Pulse:  [73-84] 79  Resp:  [16-20] 20  SpO2:  [95 %-100 %] 97 %  BP: (165-177)/(81-97) 165/86     Weight: 126.6 kg (279 lb 1.6 oz)  Height: 5' 7" (170.2 cm)  Body mass index is 43.71 kg/m².      Intake/Output Summary (Last 24 hours) at 8/1/2020 0934  Last data filed at 7/31/2020 1350  Gross per 24 hour   Intake --   Output 1200 ml   Net -1200 ml       Ortho/SPM Exam   C collar in place  Posterior cervical dressing c/d/i  Neuro exams table    Significant Labs:   All pertinent labs within the past 24 hours have been reviewed.    Significant Imaging: I have reviewed and interpreted all pertinent imaging results/findings.    Assessment/Plan:     * Cervical stenosis of spine  Elmer Borrero is a 64 y.o. male with cervical stenosis s/p PCF C3-C6 (7/28/20)    Pain control: multimodal  PT/OT: WBAT BLE  DVT PPx:will restart brilinta today, SCDs at all times when not ambulating  Abx: postop Ancef done  Labs: no new labs  Drain: dced  C collar at all times  Medicine consulted, recommend dc metoprolol and started on carvidolol  Dispo:pending pain control, mobilization and control of HTN              Elvis Patel MD  Orthopedics  [facility]  "

## 2020-08-01 NOTE — PLAN OF CARE
Problem: Physical Therapy Goal  Goal: Physical Therapy Goal  Description: Goals to be met by: 20    Patient will increase functional independence with mobility by performin. Supine to sit with Set-up [place]  2. Sit to supine with Set-up [place]  3. Sit to stand transfer with Supervision  4. Gait  x 150 feet with Supervision using Rolling Walker.   5.  Patient will demonstrate independence with a home exercise program  6. Patient's balance will be GOOD-: Needs SUPERVISION only during gait and able to self right with moderate LOB inconsistently.  Outcome: Ongoing, Progressing.  Patient progressing well towards his goals, as evidence of transfer ability and walking range.

## 2020-08-01 NOTE — PROGRESS NOTES
[facility] Hospital Medicine  Progress Note    Patient Name: Elmer Borrero  MRN: [id number]  Patient Class: IP- Inpatient   Admission Date: 7/28/2020  Length of Stay: 4 days  Attending Physician: Abraham Dang MD  Primary Care Provider: Dae Romo MD    [facility] Medicine Team: Networked reference to record PCT  Kyle Padgett MD    Subjective:     Principal Problem:Cervical stenosis of spine        HPI:  64 y.o. M w/ pmh of CAD, HTN, HLD, and OSAS on CPAP, 3 days out of a laminectomy and a posterior cervical fusion of C3-C6.     Pt presented w/ occasional left arm pain. The pain has been present since discharge from the hospital 11/19. Pt described  pain as aching and intermittent.  Pain started in the low back.  He started applying heat and the pain improved. Neck pain stared after. Back pain is intermittently present. Pain is worse with activity and improved by heat. There is no associated numbness and tingling. There is subjective weakness. Pt reports decreased  strength and difficulty with buttons. Prior treatments have included tylenol, but no PT, ESIs or recent surgery. He did have lumbar ESIs prior to his surgery in 2015.  Pt was seen by ortho would recommended the procedure. Pt has been doing well since surgery but he complains of severe pain in his left shoulder and his blood pressure has been higher than his baseline.     Pt had a left heart catheterization for worsening angina in 11/19 and an RCA stent was placed. Pt is s/p knee replacement 9/2019.Pt mentions a history of unknown lumbar spine surgery in 2015 in [place].    Medicine is consulted for assistance with HTN management.     Overview/Hospital Course:  No notes on file    Past Medical History:   Diagnosis Date    Actinic keratosis     Arthritis     BPH (benign prostatic hyperplasia)     Cataract     Coronary artery disease     Fatty liver     Heart attack     Hyperlipidemia     Hypertension      Kidney stone     Morbid obesity with BMI of 40.0-44.9, adult 10/2/2019    Obesity (BMI 30-39.9) 10/2/2019    GEOVANNA on CPAP     Renal calculi     Subclinical hypothyroidism        Past Surgical History:   Procedure Laterality Date    JOINT REPLACEMENT      left knee    KNEE SURGERY      LEFT HEART CATHETERIZATION Left 11/27/2019    Procedure: Left heart cath;  Surgeon: Dejan Nielsen MD;  Location: [facility] CATH;  Service: Cardiovascular;  Laterality: Left;    PERCUTANEOUS CRYOTHERAPY OF PERIPHERAL NERVE USING LIQUID NITROUS OXIDE IN CLOSED NEEDLE DEVICE Right 8/26/2019    Procedure: CRYOTHERAPY, NERVE, PERIPHERAL, PERCUTANEOUS, USING LIQUID NITROUS OXIDE IN CLOSED NEEDLE DEVICE;  Surgeon: CHYNA Quevedo;  Location: [facility] CATH LAB;  Service: Pain Management;  Laterality: Right;  Right Knee IOVERA    POSTERIOR FUSION OF CERVICAL SPINE WITH LAMINECTOMY N/A 7/28/2020    Procedure: LAMINECTOMY, SPINE, CERVICAL, WITH POSTERIOR FUSION C3-6 Depuy SNS:Motors/SSEP/EMG Old Jer + Pads + tongs ;  Surgeon: Abraham Dang MD;  Location: [facility] OR [address] [place] [address];  Service: Orthopedics;  Laterality: N/A;    right knee replacement       SPINE SURGERY      around 2010     TOTAL KNEE ARTHROPLASTY Right 9/5/2019    Procedure: ARTHROPLASTY, KNEE, TOTAL-VINCENZO-SAME DAY;  Surgeon: Arturo Schultz MD;  Location: [facility] OR [address] [place] [address];  Service: Orthopedics;  Laterality: Right;       Review of patient's allergies indicates:   Allergen Reactions    Ace inhibitors Other (See Comments)     cough       No current facility-administered medications on file prior to encounter.      Current Outpatient Medications on File Prior to Encounter   Medication Sig    allopurinol (ZYLOPRIM) 300 MG tablet Take 1 tablet (300 mg total) by mouth once daily. (Patient taking differently: Take 300 mg by mouth once daily. Take if needed)    finasteride (PROSCAR) 5 mg tablet TAKE 1 TABLET BY MOUTH EVERY DAY (Patient taking differently: Take in am  of surgery)    loratadine (CLARITIN) 10 mg tablet Take 1 tablet by mouth daily as needed. Take if needed    metoprolol succinate (TOPROL-XL) 25 MG 24 hr tablet Take 0.5 tablets (12.5 mg total) by mouth once daily. (Patient taking differently: Take 12.5 mg by mouth once daily. Take in am of surgery)    montelukast (SINGULAIR) 10 mg tablet TAKE 1 TABLET EVERY EVENING (Patient taking differently: Takes at night)    multivitamin capsule Take 1 capsule by mouth once daily. Hold for 1 week prior to surgery    rosuvastatin (CRESTOR) 20 MG tablet Take 1 tablet (20 mg total) by mouth every evening. (Patient taking differently: Take 20 mg by mouth every evening. Takes at night)    tamsulosin (FLOMAX) 0.4 mg Cap Take 1 capsule (0.4 mg total) by mouth once daily. (Patient taking differently: Take 1 capsule by mouth once daily. Take in am of surgery)    telmisartan-hydrochlorothiazide (MICARDIS HCT) 80-25 mg per tablet TAKE 1 TABLET BY MOUTH EVERY DAY (Patient taking differently: Hold am of surgery)    ticagrelor (BRILINTA) 90 mg tablet Take 1 tablet (90 mg total) by mouth 2 (two) times daily. (Patient taking differently: Take 90 mg by mouth 2 (two) times daily. Stopped per Dr Nielsen, last dose was 7/18/20.)    cyclobenzaprine (FLEXERIL) 5 MG tablet Take 1 tablet by mouth nightly. Take if needed     Family History     Problem Relation (Age of Onset)    Cancer Paternal Grandfather    Heart disease Father, Brother    Hyperlipidemia Mother    Hypertension Father, Mother, Sister        Tobacco Use    Smoking status: Never Smoker    Smokeless tobacco: Never Used   Substance and Sexual Activity    Alcohol use: Yes     Alcohol/week: 14.0 standard drinks     Types: 14 Cans of beer per week     Frequency: 4 or more times a week     Drinks per session: 3 or 4     Binge frequency: Weekly    Drug use: No    Sexual activity: Yes     Partners: Female     Review of Systems   Constitutional: Negative for activity change, appetite  change, chills, diaphoresis, fatigue and fever.   Respiratory: Negative for apnea, cough, choking, chest tightness, shortness of breath, wheezing and stridor.    Cardiovascular: Negative for chest pain, palpitations and leg swelling.   Gastrointestinal: Positive for constipation. Negative for abdominal distention, abdominal pain, anal bleeding, blood in stool, diarrhea, nausea and vomiting.   Genitourinary: Negative.    Musculoskeletal: Positive for arthralgias (L shoulder), neck pain and neck stiffness.   Skin: Negative.    Neurological: Negative for dizziness (mild dizziness withe hypertensive episodes ), tremors, seizures, syncope, facial asymmetry, speech difficulty, weakness, light-headedness, numbness and headaches.   Psychiatric/Behavioral: Negative.      Objective:     Vital Signs (Most Recent):  Temp: 96.2 °F (35.7 °C) (08/01/20 1149)  Pulse: 80 (08/01/20 1149)  Resp: 14 (08/01/20 1149)  BP: (!) 194/89 (08/01/20 1149)  SpO2: 96 % (08/01/20 1149) Vital Signs (24h Range):  Temp:  [96.2 °F (35.7 °C)-98.6 °F (37 °C)] 96.2 °F (35.7 °C)  Pulse:  [73-84] 80  Resp:  [14-20] 14  SpO2:  [95 %-100 %] 96 %  BP: (165-194)/(81-97) 194/89     Weight: 126.6 kg (279 lb 1.6 oz)  Body mass index is 43.71 kg/m².    Physical Exam  Vitals signs reviewed.   Constitutional:       General: He is not in acute distress.     Appearance: Normal appearance. He is obese. He is not ill-appearing, toxic-appearing or diaphoretic.   HENT:      Head: Normocephalic and atraumatic.   Cardiovascular:      Rate and Rhythm: Normal rate and regular rhythm.      Pulses: Normal pulses.      Heart sounds: Normal heart sounds. No murmur. No friction rub. No gallop.    Pulmonary:      Effort: Pulmonary effort is normal. No respiratory distress.      Breath sounds: Normal breath sounds. No stridor. No wheezing, rhonchi or rales.   Chest:      Chest wall: No tenderness.   Abdominal:      General: Abdomen is flat. Bowel sounds are normal. There is  distension.      Palpations: Abdomen is soft.      Tenderness: There is no abdominal tenderness. There is no guarding or rebound.   Musculoskeletal:         General: Tenderness (L neck and shoulder) present. No swelling.      Right lower leg: No edema.      Left lower leg: No edema.   Skin:     General: Skin is warm and dry.      Coloration: Skin is not jaundiced or pale.      Findings: No bruising, erythema, lesion or rash.   Neurological:      General: No focal deficit present.      Mental Status: He is alert and oriented to person, place, and time. Mental status is at baseline.   Psychiatric:         Mood and Affect: Mood normal.         Behavior: Behavior normal.         Thought Content: Thought content normal.         Judgment: Judgment normal.         Significant Labs:   CBC:   No results for input(s): WBC, HGB, HCT, PLT in the last 48 hours.  CMP:   No results for input(s): NA, K, CL, CO2, GLU, BUN, CREATININE, CALCIUM, PROT, ALBUMIN, BILITOT, ALKPHOS, AST, ALT, ANIONGAP, EGFRNONAA in the last 48 hours.    Invalid input(s): ESTGFAFRICA    Significant Imaging: I have reviewed all pertinent imaging results/findings within the past 24 hours.      Assessment/Plan:      Hypertension  Pt is severe pain that unmanaged with current pain control agents. BP at home in 140s-150s. Home Rx: Temisartan 80 mg + HCTZ 25 mg and Metoprolol XL 12.5 mg.     PLAN:  -- Continue Carvedilol 6.25 mg daily  -- Optimize pain management agents and consider scheduled agents as well as PRN. Would recommend  adding a lidocain patch and adding a scheduled opiate to the pt's regimen   -- Schedule Senna to help with constipation and a trial of suppository         VTE Risk Mitigation (From admission, onward)         Ordered     IP VTE HIGH RISK PATIENT  Once      07/28/20 1548     Place sequential compression device  Until discontinued      07/28/20 1548                      Kyle Padgett MD  Department of Hospital Medicine   [facility]  [place]

## 2020-08-01 NOTE — PLAN OF CARE
Problem: Occupational Therapy Goal  Goal: Occupational Therapy Goal  Description: Goals to be met by: 8/12/2020     Patient will increase functional independence with ADLs by performing:    UE Dressing with Stand-by Assistance.  LE Dressing with Minimal Assistance and AE PRN.  Grooming while standing with Supervision.  Toileting from toilet with Stand-by Assistance for hygiene and clothing management.   Supine to sit with Supervision in preparation for EOB/OOB functional activities.  Toilet transfer to toilet with Supervision and LRAD PRN.    Outcome: Ongoing, Progressing    Goals remain   Renu Amaya OT  8/1/2020

## 2020-08-01 NOTE — PT/OT/SLP PROGRESS
Physical Therapy Treatment    Patient Name:  Elmer Borrero   MRN:  [id number]    Recommendations:     Discharge Recommendations:  home with home health   Discharge Equipment Recommendations: none   Barriers to discharge: None    Assessment:     Elmer Borrero is a 64 y.o. male admitted with a medical diagnosis of Cervical stenosis of spine.  He presents with the following impairments/functional limitations:  weakness, impaired endurance, impaired self care skills, impaired functional mobilty, gait instability, impaired balance, decreased lower extremity function, pain, decreased ROM, impaired skin, impaired cardiopulmonary response to activity . Patient ambulates with decreased deidra and min. Unsteadiness, but showed improved ability to ambulate today. L LE appeared a little weaker than the R LE.    Rehab Prognosis: Good; patient would benefit from acute skilled PT services to address these deficits and reach maximum level of function.    Recent Surgery: Procedure(s) (LRB):  LAMINECTOMY, SPINE, CERVICAL, WITH POSTERIOR FUSION C3-6 Depuy SNS:Motors/SSEP/EMG Old Jer + Pads + tongs  (N/A) 4 Days Post-Op    Plan:     During this hospitalization, patient to be seen daily to address the identified rehab impairments via gait training, therapeutic activities, therapeutic exercises, neuromuscular re-education and progress toward the following goals:    · Plan of Care Expires:  08/28/20    Subjective     Chief Complaint: weakness  Patient/Family Comments/goals: to heal well and to go home.  Pain/Comfort:  · Pain Rating 1: 2/10  · Location - Orientation 1: generalized  · Location 1: neck  · Pain Addressed 1: Pre-medicate for activity, Reposition, Distraction, Cessation of Activity  · Pain Rating Post-Intervention 1: 2/10      Objective:     Communicated with NSG prior to session.  Patient found supine with SCD, telemetry upon PT entry to room.     General Precautions: Standard, fall   Orthopedic  Precautions:spinal precautions   Braces: Cervical collar     Functional Mobility:  · Bed Mobility:     · Rolling Left:  stand by assistance  · Scooting: stand by assistance  · Supine to Sit: stand by assistance  · Sit to Supine: stand by assistance  · Transfers:     · Sit to Stand:  contact guard assistance and minimum assistance with rolling walker  · Bed to Chair: contact guard assistance with  rolling walker  using  Stand Pivot  · Gait: 24 ft , 60 ft and 12 ft x 2 to the toilette using RW with CGA and chair follow as a safety measure.      AM-PAC 6 CLICK MOBILITY  Turning over in bed (including adjusting bedclothes, sheets and blankets)?: 4  Sitting down on and standing up from a chair with arms (e.g., wheelchair, bedside commode, etc.): 3  Moving from lying on back to sitting on the side of the bed?: 4  Moving to and from a bed to a chair (including a wheelchair)?: 3  Need to walk in hospital room?: 3  Climbing 3-5 steps with a railing?: 2  Basic Mobility Total Score: 19       Therapeutic Activities and Exercises:   Donned/Doffed a gown. Patient sat at EOB to prepare for treatment. Patient assisted to the toilette. There ex in sitting: LAQ, HIP FLEX, HIP ABD AND HEEL/TOE RAISES 2X12 REPS B LE.    Patient left HOB elevated with all lines intact, call button in reach and Spouse present..    GOALS:   Multidisciplinary Problems     Physical Therapy Goals        Problem: Physical Therapy Goal    Goal Priority Disciplines Outcome Goal Variances Interventions   Physical Therapy Goal     PT, PT/OT Ongoing, Progressing     Description: Goals to be met by: 20    Patient will increase functional independence with mobility by performin. Supine to sit with Set-up [place]  2. Sit to supine with Set-up [place]  3. Sit to stand transfer with Supervision  4. Gait  x 150 feet with Supervision using Rolling Walker.   5.  Patient will demonstrate independence with a home exercise program  6. Patient's balance  will be GOOD-: Needs SUPERVISION only during gait and able to self right with moderate LOB inconsistently.                   Time Tracking:     PT Received On: 08/01/20  PT Start Time: 1516     PT Stop Time: 1558  PT Total Time (min): 42 min     Billable Minutes: Gait Training 15, Therapeutic Activity 15 and Therapeutic Exercise 12    Treatment Type: Treatment  PT/PTA: PTA     PTA Visit Number: 1     Oj Huddleston, PTA  08/01/2020

## 2020-08-02 VITALS
RESPIRATION RATE: 18 BRPM | TEMPERATURE: 97 F | SYSTOLIC BLOOD PRESSURE: 146 MMHG | WEIGHT: 279.13 LBS | DIASTOLIC BLOOD PRESSURE: 82 MMHG | HEIGHT: 67 IN | HEART RATE: 69 BPM | OXYGEN SATURATION: 96 % | BODY MASS INDEX: 43.81 KG/M2

## 2020-08-02 PROCEDURE — 97535 SELF CARE MNGMENT TRAINING: CPT

## 2020-08-02 PROCEDURE — 25000003 PHARM REV CODE 250: Performed by: STUDENT IN AN ORGANIZED HEALTH CARE EDUCATION/TRAINING PROGRAM

## 2020-08-02 PROCEDURE — 97530 THERAPEUTIC ACTIVITIES: CPT | Mod: CQ

## 2020-08-02 PROCEDURE — 63600175 PHARM REV CODE 636 W HCPCS: Performed by: STUDENT IN AN ORGANIZED HEALTH CARE EDUCATION/TRAINING PROGRAM

## 2020-08-02 RX ORDER — CARVEDILOL 6.25 MG/1
6.25 TABLET ORAL 2 TIMES DAILY
Status: DISCONTINUED | OUTPATIENT
Start: 2020-08-02 | End: 2020-08-02 | Stop reason: HOSPADM

## 2020-08-02 RX ORDER — CARVEDILOL 6.25 MG/1
6.25 TABLET ORAL 2 TIMES DAILY
Qty: 60 TABLET | Refills: 0 | Status: SHIPPED | OUTPATIENT
Start: 2020-08-02 | End: 2023-11-14

## 2020-08-02 RX ADMIN — METHOCARBAMOL TABLETS 750 MG: 750 TABLET, COATED ORAL at 12:08

## 2020-08-02 RX ADMIN — TELMISARTAN 80 MG: 20 TABLET ORAL at 09:08

## 2020-08-02 RX ADMIN — TICAGRELOR 90 MG: 90 TABLET ORAL at 09:08

## 2020-08-02 RX ADMIN — CARVEDILOL 6.25 MG: 6.25 TABLET, FILM COATED ORAL at 09:08

## 2020-08-02 RX ADMIN — FINASTERIDE 5 MG: 5 TABLET, FILM COATED ORAL at 09:08

## 2020-08-02 RX ADMIN — ONDANSETRON 8 MG: 8 TABLET, ORALLY DISINTEGRATING ORAL at 06:08

## 2020-08-02 RX ADMIN — OXYCODONE HYDROCHLORIDE 15 MG: 10 TABLET ORAL at 09:08

## 2020-08-02 RX ADMIN — MORPHINE SULFATE 3 MG: 2 INJECTION, SOLUTION INTRAMUSCULAR; INTRAVENOUS at 05:08

## 2020-08-02 RX ADMIN — DOCUSATE SODIUM 50 MG: 50 CAPSULE, LIQUID FILLED ORAL at 09:08

## 2020-08-02 RX ADMIN — METHOCARBAMOL TABLETS 750 MG: 750 TABLET, COATED ORAL at 09:08

## 2020-08-02 RX ADMIN — OXYCODONE HYDROCHLORIDE 15 MG: 10 TABLET ORAL at 12:08

## 2020-08-02 RX ADMIN — MORPHINE SULFATE 3 MG: 2 INJECTION, SOLUTION INTRAMUSCULAR; INTRAVENOUS at 01:08

## 2020-08-02 RX ADMIN — TAMSULOSIN HYDROCHLORIDE 0.4 MG: 0.4 CAPSULE ORAL at 09:08

## 2020-08-02 RX ADMIN — GABAPENTIN 400 MG: 400 CAPSULE ORAL at 09:08

## 2020-08-02 RX ADMIN — OXYCODONE HYDROCHLORIDE 15 MG: 10 TABLET ORAL at 04:08

## 2020-08-02 RX ADMIN — FAMOTIDINE 20 MG: 20 TABLET, FILM COATED ORAL at 09:08

## 2020-08-02 RX ADMIN — ACETAMINOPHEN 1000 MG: 325 TABLET ORAL at 05:08

## 2020-08-02 RX ADMIN — HYDROCHLOROTHIAZIDE 25 MG: 25 TABLET ORAL at 09:08

## 2020-08-02 RX ADMIN — POLYETHYLENE GLYCOL 3350 17 G: 17 POWDER, FOR SOLUTION ORAL at 09:08

## 2020-08-02 NOTE — ASSESSMENT & PLAN NOTE
Elmer Cadena Gissell is a 64 y.o. male with cervical stenosis s/p PCF C3-C6 (7/28/20)    Pain control: multimodal  PT/OT: WBAT BLE  DVT PPx:will restart brilinta today, SCDs at all times when not ambulating  Abx: postop Ancef done  Labs: no new labs  Drain: dced  C collar at all times      Dispo: home today with

## 2020-08-02 NOTE — PT/OT/SLP PROGRESS
Physical Therapy Treatment    Patient Name:  Elmer Borrero   MRN:  [id number]    Recommendations:     Discharge Recommendations:  home with home health   Discharge Equipment Recommendations: none   Barriers to discharge: None    Assessment:     Elmer Borrero is a 64 y.o. male admitted with a medical diagnosis of Cervical stenosis of spine.  He presents with the following impairments/functional limitations:  weakness, impaired endurance, impaired self care skills, impaired functional mobilty, gait instability, impaired balance, pain, decreased ROM, impaired cardiopulmonary response to activity, orthopedic precautions . Treatment limited today as Patient was preparing to go home and transport was here to pick him up. Patient transferred with less difficulty today and showed improved postural awareness.    Rehab Prognosis: Good; patient would benefit from acute skilled PT services to address these deficits and reach maximum level of function.    Recent Surgery: Procedure(s) (LRB):  LAMINECTOMY, SPINE, CERVICAL, WITH POSTERIOR FUSION C3-6 Depuy SNS:Motors/SSEP/EMG Old Jer + Pads + tongs  (N/A) 5 Days Post-Op    Plan:     During this hospitalization, patient to be seen daily to address the identified rehab impairments via gait training, therapeutic activities, therapeutic exercises, neuromuscular re-education and progress toward the following goals:    · Plan of Care Expires:  08/28/20    Subjective     Chief Complaint: neck soreness  Patient/Family Comments/goals: to go home  Pain/Comfort:  · Pain Rating 1: 3/10  · Location - Orientation 1: generalized  · Location 1: neck  · Pain Addressed 1: Pre-medicate for activity  · Pain Rating Post-Intervention 1: 3/10      Objective:     Communicated with NSG prior to session.  Patient found HOB elevated with telemetry upon PT entry to room.     General Precautions: Standard, fall   Orthopedic Precautions:spinal precautions   Braces: Cervical collar      Functional Mobility:  · Bed Mobility:     · Rolling Left:  supervision  · Scooting: supervision  · Supine to Sit: supervision  · Transfers:     · Sit to Stand:  stand by assistance with rolling walker  · Bed to Chair: contact guard assistance with  rolling walker  using  Stand Pivot  · Gait: 12 ft to transport chair with RW and CGA.      AM-PAC 6 CLICK MOBILITY  Turning over in bed (including adjusting bedclothes, sheets and blankets)?: 4  Sitting down on and standing up from a chair with arms (e.g., wheelchair, bedside commode, etc.): 4  Moving from lying on back to sitting on the side of the bed?: 4  Moving to and from a bed to a chair (including a wheelchair)?: 3  Need to walk in hospital room?: 3  Climbing 3-5 steps with a railing?: 2  Basic Mobility Total Score: 20       Therapeutic Activities and Exercises:   Assisted to transport chair and loading all his belongings.    Patient left up in chair with transport employee present..    GOALS:   Multidisciplinary Problems     Physical Therapy Goals        Problem: Physical Therapy Goal    Goal Priority Disciplines Outcome Goal Variances Interventions   Physical Therapy Goal     PT, PT/OT Ongoing, Progressing     Description: Goals to be met by: 20    Patient will increase functional independence with mobility by performin. Supine to sit with Set-up Gooding  2. Sit to supine with Set-up Gooding  3. Sit to stand transfer with Supervision  4. Gait  x 150 feet with Supervision using Rolling Walker.   5.  Patient will demonstrate independence with a home exercise program  6. Patient's balance will be GOOD-: Needs SUPERVISION only during gait and able to self right with moderate LOB inconsistently.                   Time Tracking:     PT Received On: 20  PT Start Time: 1320     PT Stop Time: 1330  PT Total Time (min): 10 min     Billable Minutes: Therapeutic Activity 10    Treatment Type: Treatment  PT/PTA: PTA     PTA Visit Number: 1      Oj Huddleston, PTA  08/02/2020

## 2020-08-02 NOTE — PT/OT/SLP PROGRESS
Occupational Therapy   Treatment    Name: Elmer Borrero  MRN: [id number]  Admitting Diagnosis:  Cervical stenosis of spine  5 Days Post-Op    Recommendations:     Discharge Recommendations: home with home health  Discharge Equipment Recommendations:  none  Barriers to discharge:       Assessment:     Elmer Borrero is a 64 y.o. male with a medical diagnosis of Cervical stenosis of spine. Patient completed lower body dressing and upper body dressing while sitting in chair at MIN A. He had decreased pain today but continued to take increased time to complete task secondary to pain and frequent rest breaks.  Performance deficits affecting function are weakness, impaired sensation, impaired self care skills, impaired functional mobilty, gait instability, impaired balance, decreased coordination, decreased upper extremity function, pain, impaired coordination, decreased ROM, orthopedic precautions.     Rehab Prognosis:  Good; patient would benefit from acute skilled OT services to address these deficits and reach maximum level of function.       Plan:     Patient to be seen daily to address the above listed problems via self-care/home management, therapeutic activities, therapeutic exercises  · Plan of Care Expires: 08/29/20  · Plan of Care Reviewed with: patient    Subjective     Patient stated he was leaving today.     Pain/Comfort:  · Pain Rating 1: 4/10  · Location - Side 1: Left  · Location - Orientation 1: generalized  · Location 1: shoulder  · Pain Addressed 1: Reposition, Distraction, Cessation of Activity, Pre-medicate for activity    Objective:     Communicated with: RN prior to session.  Patient found supine with cervical collar upon OT entry to room.    General Precautions: Standard, fall   Orthopedic Precautions:spinal precautions   Braces: Aspen collar     Occupational Performance:     Bed Mobility:  Educated on log rolling   · Patient completed Scooting/Bridging with stand by  assistance  · Patient completed Supine to Sit with stand by assistance     Functional Mobility/Transfers:  · Patient completed Sit <> Stand Transfer with stand by assistance  with  rolling walker 1 bout from bed and 4 bouts from chair   · Patient completed Bed <> Chair Transfer using Step Transfer technique with stand by assistance with rolling walker      Activities of Daily Living:  · Upper Body Dressing: minimum assistance sitting in chair to don button down shirt   · Lower Body Dressing: minimum assistance sitting in chair to don underwear and shorts    AMPAC 6 Click ADL: 18    Treatment & Education:  -Patient required frequent rest breaks with increased time for dressing task secondary to pain   -Patient educated on dressing techniques s/p surgery: bring foot to opposite knee for lower body dressing   -Patient educated on role of OT and plan of care     Patient left up in chair with all lines intact and call button in reachEducation:      GOALS:   Multidisciplinary Problems     Occupational Therapy Goals        Problem: Occupational Therapy Goal    Goal Priority Disciplines Outcome Interventions   Occupational Therapy Goal     OT, PT/OT Ongoing, Progressing    Description: Goals to be met by: 8/12/2020     Patient will increase functional independence with ADLs by performing:    UE Dressing with Stand-by Assistance.  LE Dressing with Minimal Assistance and AE PRN.  Grooming while standing with Supervision.  Toileting from toilet with Stand-by Assistance for hygiene and clothing management.   Supine to sit with Supervision in preparation for EOB/OOB functional activities.  Toilet transfer to toilet with Supervision and LRAD PRN.                     Time Tracking:     OT Date of Treatment: 08/02/20  OT Start Time: 1110  OT Stop Time: 1148  OT Total Time (min): 38 min    Billable Minutes:Self Care/Home Management 38    Renu Amaya OT  8/2/2020

## 2020-08-02 NOTE — SUBJECTIVE & OBJECTIVE
"Principal Problem:Cervical stenosis of spine    Principal Orthopedic Problem: s/p PCF C3-C6    Interval History: NAEON. Pt pain well-controlled. Hypertensive high of 183/83 overnight now systolic 140's. DC home today.   Review of patient's allergies indicates:   Allergen Reactions    Ace inhibitors Other (See Comments)     cough       Current Facility-Administered Medications   Medication    acetaminophen tablet 1,000 mg    bisacodyL suppository 10 mg    carvediloL tablet 6.25 mg    diphenhydrAMINE capsule 25 mg    docusate sodium capsule 50 mg    famotidine tablet 20 mg    finasteride tablet 5 mg    gabapentin capsule 400 mg    glycerin adult suppository 1 suppository    telmisartan tablet 80 mg    And    hydroCHLOROthiazide tablet 25 mg    methocarbamoL tablet 750 mg    morphine injection 3 mg    ondansetron disintegrating tablet 8 mg    ondansetron injection 4 mg    oxyCODONE immediate release tablet 10 mg    oxyCODONE immediate release tablet 15 mg    oxyCODONE immediate release tablet 5 mg    polyethylene glycol packet 17 g    rosuvastatin tablet 20 mg    senna tablet 8.6 mg    sodium chloride 0.9% flush 10 mL    sodium chloride 0.9% flush 5 mL    tamsulosin 24 hr capsule 0.4 mg    ticagrelor tablet 90 mg     Objective:     Vital Signs (Most Recent):  Temp: 96.8 °F (36 °C) (08/02/20 0745)  Pulse: 69 (08/02/20 0745)  Resp: 20 (08/02/20 0900)  BP: (!) 146/82 (08/02/20 0745)  SpO2: 96 % (08/02/20 0745) Vital Signs (24h Range):  Temp:  [96.2 °F (35.7 °C)-98.4 °F (36.9 °C)] 96.8 °F (36 °C)  Pulse:  [69-85] 69  Resp:  [14-20] 20  SpO2:  [92 %-99 %] 96 %  BP: (136-194)/(79-90) 146/82     Weight: 126.6 kg (279 lb 1.6 oz)  Height: 5' 7" (170.2 cm)  Body mass index is 43.71 kg/m².      Intake/Output Summary (Last 24 hours) at 8/2/2020 1052  Last data filed at 8/1/2020 2346  Gross per 24 hour   Intake --   Output 1450 ml   Net -1450 ml       Ortho/SPM Exam   C collar in place  Posterior cervical " dressing c/d/i  Neuro exams table    Significant Labs:   All pertinent labs within the past 24 hours have been reviewed.    Significant Imaging: I have reviewed and interpreted all pertinent imaging results/findings.

## 2020-08-02 NOTE — NURSING
Paged on call to notify pt c/o lightheadness, denies dizziness, denies sob, c/o nausea. Denies blurred vision. md headed to bedside.

## 2020-08-02 NOTE — PROGRESS NOTES
[facility]  Orthopedics  Progress Note    Patient Name: Elmer Borrero  MRN: [id number]  Admission Date: 7/28/2020  Hospital Length of Stay: 5 days  Attending Provider: Abraham Dang MD  Primary Care Provider: Dae Romo MD  Follow-up For: Procedure(s) (LRB):  LAMINECTOMY, SPINE, CERVICAL, WITH POSTERIOR FUSION C3-6 Depuy SNS:Motors/SSEP/EMG Old Jer + Pads + tongs  (N/A)    Post-Operative Day: 5 Days Post-Op  Subjective:     Principal Problem:Cervical stenosis of spine    Principal Orthopedic Problem: s/p PCF C3-C6    Interval History: NAEON. Pt pain well-controlled. Hypertensive high of 183/83 overnight now systolic 140's. DC home today.   Review of patient's allergies indicates:   Allergen Reactions    Ace inhibitors Other (See Comments)     cough       Current Facility-Administered Medications   Medication    acetaminophen tablet 1,000 mg    bisacodyL suppository 10 mg    carvediloL tablet 6.25 mg    diphenhydrAMINE capsule 25 mg    docusate sodium capsule 50 mg    famotidine tablet 20 mg    finasteride tablet 5 mg    gabapentin capsule 400 mg    glycerin adult suppository 1 suppository    telmisartan tablet 80 mg    And    hydroCHLOROthiazide tablet 25 mg    methocarbamoL tablet 750 mg    morphine injection 3 mg    ondansetron disintegrating tablet 8 mg    ondansetron injection 4 mg    oxyCODONE immediate release tablet 10 mg    oxyCODONE immediate release tablet 15 mg    oxyCODONE immediate release tablet 5 mg    polyethylene glycol packet 17 g    rosuvastatin tablet 20 mg    senna tablet 8.6 mg    sodium chloride 0.9% flush 10 mL    sodium chloride 0.9% flush 5 mL    tamsulosin 24 hr capsule 0.4 mg    ticagrelor tablet 90 mg     Objective:     Vital Signs (Most Recent):  Temp: 96.8 °F (36 °C) (08/02/20 0745)  Pulse: 69 (08/02/20 0745)  Resp: 20 (08/02/20 0900)  BP: (!) 146/82 (08/02/20 0745)  SpO2: 96 % (08/02/20 0745) Vital Signs (24h  "Range):  Temp:  [96.2 °F (35.7 °C)-98.4 °F (36.9 °C)] 96.8 °F (36 °C)  Pulse:  [69-85] 69  Resp:  [14-20] 20  SpO2:  [92 %-99 %] 96 %  BP: (136-194)/(79-90) 146/82     Weight: 126.6 kg (279 lb 1.6 oz)  Height: 5' 7" (170.2 cm)  Body mass index is 43.71 kg/m².      Intake/Output Summary (Last 24 hours) at 8/2/2020 1052  Last data filed at 8/1/2020 2346  Gross per 24 hour   Intake --   Output 1450 ml   Net -1450 ml       Ortho/SPM Exam   C collar in place  Posterior cervical dressing c/d/i  Neuro exams table    Significant Labs:   All pertinent labs within the past 24 hours have been reviewed.    Significant Imaging: I have reviewed and interpreted all pertinent imaging results/findings.    Assessment/Plan:     * Cervical stenosis of spine  Elmer Borrero is a 64 y.o. male with cervical stenosis s/p PCF C3-C6 (7/28/20)    Pain control: multimodal  PT/OT: WBAT BLE  DVT PPx:will restart brilinta today, SCDs at all times when not ambulating  Abx: postop Ancef done  Labs: no new labs  Drain: dced  C collar at all times      Dispo: home today with               Elvis Patel MD  Orthopedics  [facility]  "

## 2020-08-02 NOTE — NURSING
Spoke with Dr Patel and notified about pt bp 180/84. No c/o dizziness, no headache, no blurred vision at this time. Pt reports left shoulder pain, neck pain and lue weakness. md made aware. Was advised by md to continue to monitor and report if pt is symptomatic with bp sustaining in 190's-200's systolic. Admin prn morphine for pain. Call bell in reach. Will continue to monitor.

## 2020-08-02 NOTE — PLAN OF CARE
Problem: Occupational Therapy Goal  Goal: Occupational Therapy Goal  Description: Goals to be met by: 8/12/2020     Patient will increase functional independence with ADLs by performing:    UE Dressing with Stand-by Assistance.  LE Dressing with Minimal Assistance and AE PRN.  Grooming while standing with Supervision.  Toileting from toilet with Stand-by Assistance for hygiene and clothing management.   Supine to sit with Supervision in preparation for EOB/OOB functional activities.  Toilet transfer to toilet with Supervision and LRAD PRN.    Outcome: Ongoing, Progressing    Goals remain. Patient completed dressing task at MIN A during session today with increased time.     .Renu Amaya, OT  8/2/2020

## 2020-08-03 NOTE — DISCHARGE SUMMARY
[facility]  Orthopedics  Discharge Summary      Patient Name: Elmer Borrero  MRN: [id number]  Admission Date: 7/28/2020  Hospital Length of Stay: 5 days  Discharge Date and Time: 8/2/2020  1:29 PM  Attending Physician: Abraham Dang MD  Discharging Provider: Elvis Patel MD  Primary Care Provider: Dae Romo MD    HPI: Elmer Borrero is a 64 y.o. male with PMH of left heart catheterization in November 2019, s/p knee replacement by Dr. Schultz 9/5/2019 here for initial evaluation of low back and neck pain (Back - 0, Neck - 6). He also reports a history of unknown lumbar spine surgery in 2015 in [place].  He does report occasional left arm pain. The pain has been present since he was discharged from the hospital in November. The patient describes the pain as aching and intermittent.  The pain started in the low back.  He thought he had a kidney stone and went to the hospital.  Kidney stones were ruled out.  He started applying heat and the pain improved.  Then his neck started bothering him.  His back still bothers him intermittently.   The pain is worse with activity and improved by heat. There is no associated numbness and tingling. There is subjective weakness.  He reports decreased  strength and difficulty with buttons.  Prior treatments have included tylenol, but no PT, ESIs or recent surgery.  He did have lumbar ESIs prior to his surgery in 2015.      Procedure(s) (LRB):  LAMINECTOMY, SPINE, CERVICAL, WITH POSTERIOR FUSION C3-6 Depuy SNS:Motors/SSEP/EMG Old Jer + Pads + tongs  (N/A)      Hospital Course: Patient admitted from PACU after uncomplicated surgery. Pt found to have significant htn and pain control issues on POD1. Pt ambulated 30 ft with PT on POD 2. Patient conintued to have HTN issues overnight. Medicine team consulted to adjust meds. Pain control improved. Pt discharged on POD5 after uncomplicated hospital stay.    Consults (From admission, onward)         Status Ordering Provider     Inpatient consult to Hospital Medicine-Ortho Comanagement Only  Once     Provider:  (Not yet assigned)    Completed NIKO BEEBE          Significant Diagnostic Studies: No pertinent studies.    Pending Diagnostic Studies:     None        Final Active Diagnoses:    Diagnosis Date Noted POA    PRINCIPAL PROBLEM:  Cervical stenosis of spine [M48.02] 07/28/2020 Yes    CAD (coronary artery disease) [I25.10]  Yes    Hypertension [I10]  Yes    BPH (benign prostatic hyperplasia) [N40.0]  Yes      Problems Resolved During this Admission:      Discharged Condition: good    Disposition: Home-Health Care [facility]    Follow Up:    Patient Instructions:   No discharge procedures on file.  Medications:  Reconciled Home Medications:      Medication List      START taking these medications    carvediloL 6.25 MG tablet  Commonly known as: COREG  Take 1 tablet (6.25 mg total) by mouth 2 (two) times daily.     methocarbamoL 750 MG Tab  Commonly known as: ROBAXIN  Take 1 tablet (750 mg total) by mouth 3 (three) times daily as needed (muscle spasms).     oxyCODONE 10 mg Tab immediate release tablet  Commonly known as: ROXICODONE  Take 1 tablet (10 mg total) by mouth every 4 (four) hours as needed for Pain.        CHANGE how you take these medications    allopurinoL 300 MG tablet  Commonly known as: ZYLOPRIM  Take 1 tablet (300 mg total) by mouth once daily.  What changed: additional instructions     finasteride 5 mg tablet  Commonly known as: PROSCAR  TAKE 1 TABLET BY MOUTH EVERY DAY  What changed:   · how much to take  · how to take this  · when to take this  · additional instructions     montelukast 10 mg tablet  Commonly known as: SINGULAIR  TAKE 1 TABLET EVERY EVENING  What changed:   · how much to take  · how to take this  · when to take this  · additional instructions     rosuvastatin 20 MG tablet  Commonly known as: CRESTOR  Take 1 tablet (20 mg total) by mouth every evening.  What changed:  additional instructions     tamsulosin 0.4 mg Cap  Commonly known as: FLOMAX  Take 1 capsule (0.4 mg total) by mouth once daily.  What changed: additional instructions     telmisartan-hydrochlorothiazide 80-25 mg per tablet  Commonly known as: MICARDIS HCT  TAKE 1 TABLET BY MOUTH EVERY DAY  What changed:   · how much to take  · how to take this  · when to take this  · additional instructions     ticagrelor 90 mg tablet  Commonly known as: BRILINTA  Take 1 tablet (90 mg total) by mouth 2 (two) times daily.  What changed: additional instructions        CONTINUE taking these medications    aspirin 81 MG EC tablet  Commonly known as: ECOTRIN  TAKE 1 TABLET BY MOUTH EVERY DAY     b complex vitamins tablet  Take 1 tablet by mouth once daily. Hold for 1 week prior to surgery     CLARITIN 10 mg tablet  Generic drug: loratadine  Take 1 tablet by mouth daily as needed. Take if needed     cyclobenzaprine 5 MG tablet  Commonly known as: FLEXERIL  Take 1 tablet by mouth nightly. Take if needed     fish oil-omega-3 fatty acids 300-1,000 mg capsule  Take by mouth once daily. Hold for 1 week prior to surgery     multivitamin capsule  Take 1 capsule by mouth once daily. Hold for 1 week prior to surgery        STOP taking these medications    metoprolol succinate 25 MG 24 hr tablet  Commonly known as: TOPROL-XL            Elvis Patel MD  Orthopedics  [facility]

## 2020-08-18 RX ORDER — METHOCARBAMOL 750 MG/1
750 TABLET, FILM COATED ORAL 3 TIMES DAILY
Qty: 30 TABLET | Refills: 0 | Status: SHIPPED | OUTPATIENT
Start: 2020-08-18 | End: 2020-08-28

## 2020-08-31 ENCOUNTER — OFFICE VISIT (OUTPATIENT)
Dept: ORTHOPEDICS | Facility: CLINIC | Age: 65
End: 2020-08-31
Payer: MEDICARE

## 2020-08-31 ENCOUNTER — HOSPITAL ENCOUNTER (OUTPATIENT)
Dept: RADIOLOGY | Facility: HOSPITAL | Age: 65
Discharge: HOME | End: 2020-08-31
Attending: ORTHOPAEDIC SURGERY
Payer: MEDICARE

## 2020-08-31 VITALS — HEIGHT: 67 IN | BODY MASS INDEX: 43.79 KG/M2 | WEIGHT: 279 LBS

## 2020-08-31 DIAGNOSIS — Z98.1 S/P CERVICAL SPINAL FUSION: Primary | ICD-10-CM

## 2020-08-31 DIAGNOSIS — Z98.1 S/P CERVICAL SPINAL FUSION: ICD-10-CM

## 2020-08-31 PROCEDURE — 99214 OFFICE O/P EST MOD 30 MIN: CPT | Mod: PBBFAC,25 | Performed by: PHYSICIAN ASSISTANT

## 2020-08-31 PROCEDURE — 72040 XR CERVICAL SPINE AP LATERAL: ICD-10-PCS | Mod: 26,,, | Performed by: RADIOLOGY

## 2020-08-31 PROCEDURE — 72040 X-RAY EXAM NECK SPINE 2-3 VW: CPT | Mod: TC

## 2020-08-31 PROCEDURE — 99024 POSTOP FOLLOW-UP VISIT: CPT | Mod: POP,,, | Performed by: PHYSICIAN ASSISTANT

## 2020-08-31 PROCEDURE — 72040 X-RAY EXAM NECK SPINE 2-3 VW: CPT | Mod: 26,,, | Performed by: RADIOLOGY

## 2020-08-31 PROCEDURE — 99999 PR PBB SHADOW E&M-EST. PATIENT-LVL IV: CPT | Mod: PBBFAC,,, | Performed by: PHYSICIAN ASSISTANT

## 2020-08-31 PROCEDURE — 99999 PR PBB SHADOW E&M-EST. PATIENT-LVL IV: ICD-10-PCS | Mod: PBBFAC,,, | Performed by: PHYSICIAN ASSISTANT

## 2020-08-31 PROCEDURE — 99024 PR POST-OP FOLLOW-UP VISIT: ICD-10-PCS | Mod: POP,,, | Performed by: PHYSICIAN ASSISTANT

## 2020-08-31 RX ORDER — METHOCARBAMOL 750 MG/1
750 TABLET, FILM COATED ORAL 3 TIMES DAILY
Qty: 60 TABLET | Refills: 0 | Status: SHIPPED | OUTPATIENT
Start: 2020-08-31 | End: 2020-09-20

## 2020-08-31 NOTE — PROGRESS NOTES
Date: 08/31/2020    Supervising Physician: Abraham Dang M.D.    Date of Surgery: 7/28/2020    Procedure: C3-6 lami/fusion    History: Elmer Borrero is seen today for follow-up following the above listed procedure. Overall the patient is doing well but today notes his pain is 4/10 today.  He is taking robaxin for pain.  He is in a c collar today.  He says the clumsiness in his hands is somewhat improved however he does have weakness in the left shoulder that has been present since surgery. he denies fever, chills, and sweats since the time of the surgery.       Exam: Post op dressing taken down.  Incision is healing well, clean, dry and intact.   There is no sign of infection. Neuro exam is stable. No signs of DVT.    Radiographs: imaging today shows hardware in place, no evidence of failure.     Assessment/Plan: 4 weeks post op.    Doing well postoperatively. Refill of robaxin sent to the pharmacy.  Continue c collar.  He has symptoms of a C5 nerve palsy. We will get him into physical therapy.     I will plan to see the patient back for the next postop visit in 8 weeks with imaging.    Questionnaire at next visit.      Thank you for the opportunity to participate in this patient's care. Please give me a call if there are any concerns or questions.

## 2020-09-11 PROBLEM — R29.898 BILATERAL ARM WEAKNESS: Status: ACTIVE | Noted: 2020-09-11

## 2020-09-11 PROBLEM — Z96.659 S/P KNEE REPLACEMENT: Status: RESOLVED | Noted: 2019-09-05 | Resolved: 2020-09-11

## 2020-09-11 PROBLEM — Z98.1 S/P CERVICAL SPINAL FUSION: Status: ACTIVE | Noted: 2020-09-11

## 2020-10-21 ENCOUNTER — HOSPITAL ENCOUNTER (OUTPATIENT)
Dept: RADIOLOGY | Facility: HOSPITAL | Age: 65
Discharge: HOME | End: 2020-10-21
Attending: PHYSICIAN ASSISTANT
Payer: MEDICARE

## 2020-10-21 ENCOUNTER — OFFICE VISIT (OUTPATIENT)
Dept: ORTHOPEDICS | Facility: CLINIC | Age: 65
End: 2020-10-21
Payer: MEDICARE

## 2020-10-21 VITALS
SYSTOLIC BLOOD PRESSURE: 127 MMHG | BODY MASS INDEX: 42.49 KG/M2 | WEIGHT: 270.75 LBS | HEIGHT: 67 IN | DIASTOLIC BLOOD PRESSURE: 78 MMHG | HEART RATE: 71 BPM

## 2020-10-21 DIAGNOSIS — M25.511 BILATERAL SHOULDER PAIN, UNSPECIFIED CHRONICITY: ICD-10-CM

## 2020-10-21 DIAGNOSIS — M25.512 BILATERAL SHOULDER PAIN, UNSPECIFIED CHRONICITY: Primary | ICD-10-CM

## 2020-10-21 DIAGNOSIS — M25.512 BILATERAL SHOULDER PAIN, UNSPECIFIED CHRONICITY: ICD-10-CM

## 2020-10-21 DIAGNOSIS — R29.898 WEAKNESS OF BOTH UPPER EXTREMITIES: ICD-10-CM

## 2020-10-21 DIAGNOSIS — M25.511 BILATERAL SHOULDER PAIN, UNSPECIFIED CHRONICITY: Primary | ICD-10-CM

## 2020-10-21 PROCEDURE — 99213 OFFICE O/P EST LOW 20 MIN: CPT | Mod: 24,25,S$PBB, | Performed by: PHYSICIAN ASSISTANT

## 2020-10-21 PROCEDURE — 20610 PR DRAIN/INJECT LARGE JOINT/BURSA: ICD-10-PCS | Mod: 79,S$PBB,LT, | Performed by: PHYSICIAN ASSISTANT

## 2020-10-21 PROCEDURE — 99213 PR OFFICE/OUTPT VISIT, EST, LEVL III, 20-29 MIN: ICD-10-PCS | Mod: 24,25,S$PBB, | Performed by: PHYSICIAN ASSISTANT

## 2020-10-21 PROCEDURE — 73030 X-RAY EXAM OF SHOULDER: CPT | Mod: 26,50,, | Performed by: RADIOLOGY

## 2020-10-21 PROCEDURE — 99214 OFFICE O/P EST MOD 30 MIN: CPT | Mod: PBBFAC,25 | Performed by: PHYSICIAN ASSISTANT

## 2020-10-21 PROCEDURE — 20610 DRAIN/INJ JOINT/BURSA W/O US: CPT | Mod: PBBFAC | Performed by: PHYSICIAN ASSISTANT

## 2020-10-21 PROCEDURE — 99999 PR PBB SHADOW E&M-EST. PATIENT-LVL IV: ICD-10-PCS | Mod: PBBFAC,,, | Performed by: PHYSICIAN ASSISTANT

## 2020-10-21 PROCEDURE — 99999 PR PBB SHADOW E&M-EST. PATIENT-LVL IV: CPT | Mod: PBBFAC,,, | Performed by: PHYSICIAN ASSISTANT

## 2020-10-21 PROCEDURE — 20610 DRAIN/INJ JOINT/BURSA W/O US: CPT | Mod: 79,S$PBB,LT, | Performed by: PHYSICIAN ASSISTANT

## 2020-10-21 PROCEDURE — 73030 X-RAY EXAM OF SHOULDER: CPT | Mod: TC,50

## 2020-10-21 PROCEDURE — 73030 XR SHOULDER COMPLETE 2 OR MORE VIEWS BILATERAL: ICD-10-PCS | Mod: 26,50,, | Performed by: RADIOLOGY

## 2020-10-21 RX ORDER — BETAMETHASONE SODIUM PHOSPHATE AND BETAMETHASONE ACETATE 3; 3 MG/ML; MG/ML
6 INJECTION, SUSPENSION INTRA-ARTICULAR; INTRALESIONAL; INTRAMUSCULAR; SOFT TISSUE
Status: COMPLETED | OUTPATIENT
Start: 2020-10-21 | End: 2020-10-21

## 2020-10-21 RX ADMIN — BETAMETHASONE SODIUM PHOSPHATE AND BETAMETHASONE ACETATE 6 MG: 3; 3 INJECTION, SUSPENSION INTRA-ARTICULAR; INTRALESIONAL; INTRAMUSCULAR at 11:10

## 2020-10-21 NOTE — PROGRESS NOTES
SUBJECTIVE:     Chief Complaint & History of Present Illness:  Elmer Borrero is a  Established  patient 65 y.o. male who is seen here today with a complaint of    Chief Complaint   Patient presents with    Left Shoulder - Pain    Right Shoulder - Pain    .  Patient is here today for evaluation treatment of bilateral shoulder pain left more so than right.  He is status post cervical fusion performed by Dr. Dang  07/28/2020.  He has had issues with bilateral upper extremity weakness following his surgery and is currently in a very aggressive physical therapy program for this.  He has had a history of traumatic injuries to both shoulders dating back multiple years was diagnosed with a rotator cuff tear to some degree to the left shoulder several years ago.  Was treated conservatively without surgical interventions with good results until his postsurgical period.  On a scale of 1-10, with 10 being worst pain imaginable, he rates this pain as 4 on good days and 6 on bad days.  he describes the pain as sore and achy.    Review of patient's allergies indicates:   Allergen Reactions    Ace inhibitors Other (See Comments)     cough         Current Outpatient Medications   Medication Sig Dispense Refill    allopurinol (ZYLOPRIM) 300 MG tablet Take 1 tablet (300 mg total) by mouth once daily. (Patient taking differently: Take 300 mg by mouth once daily. Take if needed) 90 tablet 3    aspirin (ECOTRIN) 81 MG EC tablet TAKE 1 TABLET BY MOUTH EVERY DAY 32 tablet 0    b complex vitamins tablet Take 1 tablet by mouth once daily. Hold for 1 week prior to surgery      BRILINTA 90 mg tablet TAKE 1 TABLET (90 MG TOTAL) BY MOUTH 2 (TWO) TIMES DAILY. 180 tablet 3    finasteride (PROSCAR) 5 mg tablet TAKE 1 TABLET BY MOUTH EVERY DAY 90 tablet 1    loratadine (CLARITIN) 10 mg tablet Take 1 tablet by mouth daily as needed. Take if needed      montelukast (SINGULAIR) 10 mg tablet TAKE 1 TABLET EVERY EVENING (Patient  taking differently: Takes at night) 90 tablet 1    multivitamin capsule Take 1 capsule by mouth once daily. Hold for 1 week prior to surgery      omega-3 fatty acids/fish oil (FISH OIL-OMEGA-3 FATTY ACIDS) 300-1,000 mg capsule Take by mouth once daily. Hold for 1 week prior to surgery      oxyCODONE (ROXICODONE) 10 mg Tab immediate release tablet Take 1 tablet (10 mg total) by mouth every 4 (four) hours as needed for Pain. 54 tablet 0    rosuvastatin (CRESTOR) 20 MG tablet Take 1 tablet (20 mg total) by mouth every evening. (Patient taking differently: Take 20 mg by mouth every evening. Takes at night) 90 tablet 3    tamsulosin (FLOMAX) 0.4 mg Cap Take 1 capsule (0.4 mg total) by mouth once daily. (Patient taking differently: Take 1 capsule by mouth once daily. Take in am of surgery) 90 capsule 3    telmisartan-hydrochlorothiazide (MICARDIS HCT) 80-25 mg per tablet TAKE 1 TABLET BY MOUTH EVERY DAY (Patient taking differently: Hold am of surgery) 90 tablet 3    carvediloL (COREG) 6.25 MG tablet Take 1 tablet (6.25 mg total) by mouth 2 (two) times daily. 60 tablet 0     No current facility-administered medications for this visit.      Facility-Administered Medications Ordered in Other Visits   Medication Dose Route Frequency Provider Last Rate Last Dose    sodium chloride 0.9% flush 10 mL  10 mL Intravenous PRN Jade Baker MD           Past Medical History:   Diagnosis Date    Actinic keratosis     Arthritis     BPH (benign prostatic hyperplasia)     Cataract     Coronary artery disease     Fatty liver     Heart attack     Hyperlipidemia     Hypertension     Kidney stone     Morbid obesity with BMI of 40.0-44.9, adult 10/2/2019    Obesity (BMI 30-39.9) 10/2/2019    GEOVANNA on CPAP     Renal calculi     Subclinical hypothyroidism        Past Surgical History:   Procedure Laterality Date    JOINT REPLACEMENT      left knee    KNEE SURGERY      LEFT HEART CATHETERIZATION Left 11/27/2019     Procedure: Left heart cath;  Surgeon: Dejan Nielsen MD;  Location: [facility] CATH;  Service: Cardiovascular;  Laterality: Left;    PERCUTANEOUS CRYOTHERAPY OF PERIPHERAL NERVE USING LIQUID NITROUS OXIDE IN CLOSED NEEDLE DEVICE Right 8/26/2019    Procedure: CRYOTHERAPY, NERVE, PERIPHERAL, PERCUTANEOUS, USING LIQUID NITROUS OXIDE IN CLOSED NEEDLE DEVICE;  Surgeon: CHYNA Quevedo;  Location: [facility] CATH LAB;  Service: Pain Management;  Laterality: Right;  Right Knee IOVERA    POSTERIOR FUSION OF CERVICAL SPINE WITH LAMINECTOMY N/A 7/28/2020    Procedure: LAMINECTOMY, SPINE, CERVICAL, WITH POSTERIOR FUSION C3-6 Depuy SNS:Motors/SSEP/EMG Old Jer + Pads + tongs ;  Surgeon: Abraham Dang MD;  Location: [facility] OR [address] [place] [address];  Service: Orthopedics;  Laterality: N/A;    right knee replacement       SPINE SURGERY      around 2010     TOTAL KNEE ARTHROPLASTY Right 9/5/2019    Procedure: ARTHROPLASTY, KNEE, TOTAL-VINCENZO-SAME DAY;  Surgeon: Arturo Schultz MD;  Location: [facility] OR [address] [place] [address];  Service: Orthopedics;  Laterality: Right;       Vital Signs (Most Recent)  Vitals:    10/21/20 1020   BP: 127/78   Pulse: 71       Review of Systems:  ROS:  Constitutional: no fever or chills  Eyes: no visual changes  ENT: no nasal congestion or sore throat  Respiratory: no cough or shortness of breath, Positive pulmonary nodule  Cardiovascular: no chest pain or palpitations, Positive CAD, status post MI  Gastrointestinal: no nausea or vomiting, tolerating diet  Genitourinary: no hematuria or dysuria, Positive BPH, history of renal calculi  Integument/Breast: no rash or pruritis  Hematologic/Lymphatic: no easy bruising or lymphadenopathy  Musculoskeletal: no arthralgias or myalgias, Status post cervical fusion, TKA,  Neurological: no seizures or tremors, Positive cervical stenosis of the spine status post cervical spinal fusion  Behavioral/Psych: no auditory or visual hallucinations, Excessive ETOH  Endocrine: no heat or cold  "intolerance, Positive hypothyroidism, adiposity      OBJECTIVE:     PHYSICAL EXAM:  Height: 5' 7" (170.2 cm) Weight: 122.8 kg (270 lb 11.6 oz), General Appearance: Well nourished, well developed, in no acute distress.  Neurological: Mood & affect are normal.  Shoulder exam: left  Tenderness: biceps tendon, lateral acromial, posterior acromial, deltoid muscle  ROM: forward flexion 180/180, extension 45/45, full abduction 180/180, abduction-glenohumeral 90/90, external rotation 50/50  Shoulder Strength: biceps 5/5, triceps 5/5, abduction 4/5, adduction 4/5, flexion 4/5, extension 5/5  positive for tenderness about the glenohumeral joint, positive for tenderness over the acromioclavicular joint and negative for impingement sign  Special Tests:Cross-chest abduction: diffuse pain     Shoulder exam: right  Tenderness: biceps tendon, lateral acromial, posterior acromial  ROM: forward flexion 180/180, extension 45/45, full abduction 180/180, abduction-glenohumeral 90/90, external rotation 50/50  Shoulder Strength: biceps 5/5, triceps 5/5, abduction 5/5, adduction 4/5, flexion 4/5, extension 5/5  negative for tenderness about the glenohumeral joint, positive for tenderness over the acromioclavicular joint and negative for impingement sign  Special Tests:Cross-chest abduction: negative                  RADIOGRAPHS:  X-rays taken today films reviewed by me demonstrate mild to moderate arthritic changes throughout both shoulders left more so than with glenohumeral joint space narrowing early osteophytic spurring no evidence of fracture dislocation    ASSESSMENT/PLAN:       ICD-10-CM ICD-9-CM   1. Bilateral shoulder pain, unspecified chronicity  M25.511 719.41    M25.512        Plan: We discussed with the patient at length all the different treatment options available for his bilateralshoulder including anti-inflammatories, acetaminophen, rest, ice, Physical therapy to include strengthening exercise, occasional cortisone " injections for temporary relief, arthroscopic surgical repair, and finally shoulder arthroplasty.   Patient is having more issues with the left shoulder than the right with this in mind will proceed with therapeutic diagnostic cortisone injection of the left and continue physical therapy as directed I will see him back in 2 weeks to reassess and consider injection therapy on the right if necessary      The injection site was identified and the skin was prepared with an ETOH solution. The    left  shoulder was injected with 1 ml of Celestone and 5 ml Lidocaine under sterile technique. Elmer Borrero tolerated the procedure well, he was advised to rest the  shoulder  today, ice and support. he did receive immediate relief of the pain in and about his  shoulder  he was told this would be short lived and is secondary to the lidocaine. he may have an increase in his discomfort tonight followed by steady improvement over the next several days. It may take 1-3 weeks following the injection to get the full benefit of the medication.  I will see him back in 2 weeks. Sooner if he has any problems or concerns.

## 2020-10-21 NOTE — LETTER
October 21, 2020      Nova Mckeon PA-C  [address]  [facility] [address]           [facility] - Orthopedics 5th Fl  [address]  [facility] [address]  Phone: [phone]          Patient: Elmer Borrero   MR Number: [id number]   YOB: 1955   Date of Visit: 10/21/2020       Dear Nova Mckeon:    Thank you for referring Elmer Borrero to me for evaluation. Attached you will find relevant portions of my assessment and plan of care.    If you have questions, please do not hesitate to call me. I look forward to following Elmer Borrero along with you.    Sincerely,    Deepak Lima PA-C    Enclosure  CC:  No Recipients    If you would like to receive this communication electronically, please contact [email or web address] or [phone] to request more information on [facility] Link access.    For providers and/or their staff who would like to refer a patient to [facility], please contact us through our one-stop-shop provider referral line, [facility], at [phone].    If you feel you have received this communication in error or would no longer like to receive these types of communications, please e-mail [email or web address]

## 2020-11-02 ENCOUNTER — OFFICE VISIT (OUTPATIENT)
Dept: ORTHOPEDICS | Facility: CLINIC | Age: 65
End: 2020-11-02
Payer: MEDICARE

## 2020-11-02 ENCOUNTER — HOSPITAL ENCOUNTER (OUTPATIENT)
Dept: RADIOLOGY | Facility: HOSPITAL | Age: 65
Discharge: HOME | End: 2020-11-02
Attending: PHYSICIAN ASSISTANT
Payer: MEDICARE

## 2020-11-02 VITALS — BODY MASS INDEX: 42.38 KG/M2 | WEIGHT: 270 LBS | HEIGHT: 67 IN

## 2020-11-02 DIAGNOSIS — Z98.1 S/P CERVICAL SPINAL FUSION: ICD-10-CM

## 2020-11-02 DIAGNOSIS — Z98.1 S/P CERVICAL SPINAL FUSION: Primary | ICD-10-CM

## 2020-11-02 PROCEDURE — 99999 PR PBB SHADOW E&M-EST. PATIENT-LVL IV: ICD-10-PCS | Mod: PBBFAC,,, | Performed by: PHYSICIAN ASSISTANT

## 2020-11-02 PROCEDURE — 99214 OFFICE O/P EST MOD 30 MIN: CPT | Mod: PBBFAC,25 | Performed by: PHYSICIAN ASSISTANT

## 2020-11-02 PROCEDURE — 72040 X-RAY EXAM NECK SPINE 2-3 VW: CPT | Mod: TC

## 2020-11-02 PROCEDURE — 72040 X-RAY EXAM NECK SPINE 2-3 VW: CPT | Mod: 26,,, | Performed by: RADIOLOGY

## 2020-11-02 PROCEDURE — 99024 POSTOP FOLLOW-UP VISIT: CPT | Mod: POP,,, | Performed by: PHYSICIAN ASSISTANT

## 2020-11-02 PROCEDURE — 72040 XR CERVICAL SPINE AP LATERAL: ICD-10-PCS | Mod: 26,,, | Performed by: RADIOLOGY

## 2020-11-02 PROCEDURE — 99999 PR PBB SHADOW E&M-EST. PATIENT-LVL IV: CPT | Mod: PBBFAC,,, | Performed by: PHYSICIAN ASSISTANT

## 2020-11-02 PROCEDURE — 99024 PR POST-OP FOLLOW-UP VISIT: ICD-10-PCS | Mod: POP,,, | Performed by: PHYSICIAN ASSISTANT

## 2020-11-02 NOTE — PROGRESS NOTES
Date: 11/02/2020    Supervising Physician: Abraham Dang M.D.    Date of Surgery: 7/28/2020    Procedure: C3-6 lami/fusion    History: Elmer Borrero is seen today for follow-up following the above listed procedure. Overall the patient is doing well.  He is in a c collar today.  He has been weaning out of the collar. He says the clumsiness in his hands is somewhat improved however he does have weakness in the left shoulder that has been present since surgery.  He saw aung and had an injection in the shoulder recently.  He said it helped a lot but he still has some difficulty with range of motion.       Exam:  Incision is healing well.   There is no sign of infection. Neuro exam is stable. No signs of DVT.    Radiographs: imaging today shows hardware in place, no evidence of failure.     Assessment/Plan: 12 weeks post op.    Doing well postoperatively. New orders for PT placed.  Scheduled with aung for his right shoulder.     I will plan to see the patient back for the next postop visit at a year from surgery.    Questionnaire at next visit.      Thank you for the opportunity to participate in this patient's care. Please give me a call if there are any concerns or questions.

## 2020-11-11 ENCOUNTER — OFFICE VISIT (OUTPATIENT)
Dept: ORTHOPEDICS | Facility: CLINIC | Age: 65
End: 2020-11-11
Payer: MEDICARE

## 2020-11-11 VITALS
HEIGHT: 67 IN | WEIGHT: 270.06 LBS | BODY MASS INDEX: 42.39 KG/M2 | DIASTOLIC BLOOD PRESSURE: 85 MMHG | SYSTOLIC BLOOD PRESSURE: 148 MMHG | HEART RATE: 70 BPM

## 2020-11-11 DIAGNOSIS — M25.512 BILATERAL SHOULDER PAIN, UNSPECIFIED CHRONICITY: Primary | ICD-10-CM

## 2020-11-11 DIAGNOSIS — M25.511 BILATERAL SHOULDER PAIN, UNSPECIFIED CHRONICITY: Primary | ICD-10-CM

## 2020-11-11 DIAGNOSIS — Z98.1 S/P CERVICAL SPINAL FUSION: ICD-10-CM

## 2020-11-11 DIAGNOSIS — R29.898 WEAKNESS OF BOTH UPPER EXTREMITIES: ICD-10-CM

## 2020-11-11 PROCEDURE — 20610 PR DRAIN/INJECT LARGE JOINT/BURSA: ICD-10-PCS | Mod: S$PBB,RT,, | Performed by: PHYSICIAN ASSISTANT

## 2020-11-11 PROCEDURE — 20610 DRAIN/INJ JOINT/BURSA W/O US: CPT | Mod: S$PBB,RT,, | Performed by: PHYSICIAN ASSISTANT

## 2020-11-11 PROCEDURE — 20610 DRAIN/INJ JOINT/BURSA W/O US: CPT | Mod: PBBFAC | Performed by: PHYSICIAN ASSISTANT

## 2020-11-11 PROCEDURE — 99213 OFFICE O/P EST LOW 20 MIN: CPT | Mod: S$PBB,25,, | Performed by: PHYSICIAN ASSISTANT

## 2020-11-11 PROCEDURE — 99999 PR PBB SHADOW E&M-EST. PATIENT-LVL IV: ICD-10-PCS | Mod: PBBFAC,,, | Performed by: PHYSICIAN ASSISTANT

## 2020-11-11 PROCEDURE — 99214 OFFICE O/P EST MOD 30 MIN: CPT | Mod: PBBFAC | Performed by: PHYSICIAN ASSISTANT

## 2020-11-11 PROCEDURE — 99213 PR OFFICE/OUTPT VISIT, EST, LEVL III, 20-29 MIN: ICD-10-PCS | Mod: S$PBB,25,, | Performed by: PHYSICIAN ASSISTANT

## 2020-11-11 PROCEDURE — 99999 PR PBB SHADOW E&M-EST. PATIENT-LVL IV: CPT | Mod: PBBFAC,,, | Performed by: PHYSICIAN ASSISTANT

## 2020-11-11 RX ORDER — BETAMETHASONE SODIUM PHOSPHATE AND BETAMETHASONE ACETATE 3; 3 MG/ML; MG/ML
6 INJECTION, SUSPENSION INTRA-ARTICULAR; INTRALESIONAL; INTRAMUSCULAR; SOFT TISSUE
Status: COMPLETED | OUTPATIENT
Start: 2020-11-11 | End: 2020-11-11

## 2020-11-11 RX ADMIN — BETAMETHASONE SODIUM PHOSPHATE AND BETAMETHASONE ACETATE 6 MG: 3; 3 INJECTION, SUSPENSION INTRA-ARTICULAR; INTRALESIONAL; INTRAMUSCULAR at 11:11

## 2020-11-11 NOTE — PROGRESS NOTES
SUBJECTIVE:     Chief Complaint & History of Present Illness:  Elmer Borrero is a  Established  patient 65 y.o. male who is seen here today with a complaint of    Chief Complaint   Patient presents with    Right Shoulder - Pain    .  He is a patient well-known to me was last seen treated the clinic for this condition 10/21/2020 at that time he had undergone a cortisone injection of the left shoulder.  States he was doing very well but has had a bit of a setback secondary to a hyperextension injury but continues to press forward with his physical therapy primarily for his cervical spine fusion but with shoulder strengthening and range of motion in conjunction with.  He is a bit frustrated at the slow slow pace of his progress but admits he is moving forward.  The plan today is to move forward with cortisone injection of the right shoulder  On a scale of 1-10, with 10 being worst pain imaginable, he rates this pain as 5 on good days and 7 on bad days.  he describes the pain as sore and achy.    Review of patient's allergies indicates:   Allergen Reactions    Ace inhibitors Other (See Comments)     cough         Current Outpatient Medications   Medication Sig Dispense Refill    allopurinoL (ZYLOPRIM) 300 MG tablet TAKE 1 TABLET ONCE DAILY 90 tablet 3    aspirin (ECOTRIN) 81 MG EC tablet TAKE 1 TABLET BY MOUTH EVERY DAY 32 tablet 0    b complex vitamins tablet Take 1 tablet by mouth once daily. Hold for 1 week prior to surgery      BRILINTA 90 mg tablet TAKE 1 TABLET (90 MG TOTAL) BY MOUTH 2 (TWO) TIMES DAILY. 180 tablet 3    finasteride (PROSCAR) 5 mg tablet TAKE 1 TABLET BY MOUTH EVERY DAY 90 tablet 1    loratadine (CLARITIN) 10 mg tablet Take 1 tablet by mouth daily as needed. Take if needed      montelukast (SINGULAIR) 10 mg tablet TAKE 1 TABLET EVERY EVENING 90 tablet 1    multivitamin capsule Take 1 capsule by mouth once daily. Hold for 1 week prior to surgery      omega-3 fatty acids/fish  oil (FISH OIL-OMEGA-3 FATTY ACIDS) 300-1,000 mg capsule Take by mouth once daily. Hold for 1 week prior to surgery      rosuvastatin (CRESTOR) 20 MG tablet TAKE 1 TABLET BY MOUTH EVERY DAY IN THE EVENING 90 tablet 3    tamsulosin (FLOMAX) 0.4 mg Cap TAKE 1 CAPSULE DAILY 90 capsule 3    telmisartan-hydrochlorothiazide (MICARDIS HCT) 80-25 mg per tablet TAKE 1 TABLET BY MOUTH EVERY DAY (Patient taking differently: Hold am of surgery) 90 tablet 3    carvediloL (COREG) 6.25 MG tablet Take 1 tablet (6.25 mg total) by mouth 2 (two) times daily. 60 tablet 0    oxyCODONE (ROXICODONE) 10 mg Tab immediate release tablet Take 1 tablet (10 mg total) by mouth every 4 (four) hours as needed for Pain. 54 tablet 0     No current facility-administered medications for this visit.      Facility-Administered Medications Ordered in Other Visits   Medication Dose Route Frequency Provider Last Rate Last Dose    sodium chloride 0.9% flush 10 mL  10 mL Intravenous PRN Jade Baker MD           Past Medical History:   Diagnosis Date    Actinic keratosis     Arthritis     BPH (benign prostatic hyperplasia)     Cataract     Coronary artery disease     Fatty liver     Heart attack     Hyperlipidemia     Hypertension     Kidney stone     Morbid obesity with BMI of 40.0-44.9, adult 10/2/2019    Obesity (BMI 30-39.9) 10/2/2019    GEOVANNA on CPAP     Renal calculi     Subclinical hypothyroidism        Past Surgical History:   Procedure Laterality Date    JOINT REPLACEMENT      left knee    KNEE SURGERY      LEFT HEART CATHETERIZATION Left 11/27/2019    Procedure: Left heart cath;  Surgeon: Dejan Nielsen MD;  Location: [facility] CATH;  Service: Cardiovascular;  Laterality: Left;    PERCUTANEOUS CRYOTHERAPY OF PERIPHERAL NERVE USING LIQUID NITROUS OXIDE IN CLOSED NEEDLE DEVICE Right 8/26/2019    Procedure: CRYOTHERAPY, NERVE, PERIPHERAL, PERCUTANEOUS, USING LIQUID NITROUS OXIDE IN CLOSED NEEDLE DEVICE;  Surgeon: Lisandra GE  "CHYNA Beard;  Location: [facility] CATH LAB;  Service: Pain Management;  Laterality: Right;  Right Knee IOVERA    POSTERIOR FUSION OF CERVICAL SPINE WITH LAMINECTOMY N/A 7/28/2020    Procedure: LAMINECTOMY, SPINE, CERVICAL, WITH POSTERIOR FUSION C3-6 Depuy SNS:Motors/SSEP/EMG Old Jer + Pads + tongs ;  Surgeon: Abraham Dang MD;  Location: [facility] OR [address] [place] [address];  Service: Orthopedics;  Laterality: N/A;    right knee replacement       SPINE SURGERY      around 2010     TOTAL KNEE ARTHROPLASTY Right 9/5/2019    Procedure: ARTHROPLASTY, KNEE, TOTAL-VINCENZO-SAME DAY;  Surgeon: Arturo Schultz MD;  Location: [facility] OR [address] [place] [address];  Service: Orthopedics;  Laterality: Right;       Vital Signs (Most Recent)  Vitals:    11/11/20 1019   BP: (!) 148/85   Pulse: 70       Review of Systems:  ROS:  Constitutional: no fever or chills  Eyes: no visual changes  ENT: no nasal congestion or sore throat  Respiratory: no cough or shortness of breath, Positive pulmonary nodule  Cardiovascular: no chest pain or palpitations, Positive CAD, status post MI  Gastrointestinal: no nausea or vomiting, tolerating diet  Genitourinary: no hematuria or dysuria, Positive BPH, history of renal calculi  Integument/Breast: no rash or pruritis  Hematologic/Lymphatic: no easy bruising or lymphadenopathy  Musculoskeletal: no arthralgias or myalgias, Status post cervical fusion, TKA,  Neurological: no seizures or tremors, Positive cervical stenosis of the spine status post cervical spinal fusion  Behavioral/Psych: no auditory or visual hallucinations, Excessive ETOH  Endocrine: no heat or cold intolerance, Positive hypothyroidism, adiposity        OBJECTIVE:     PHYSICAL EXAM:  Height: 5' 7" (170.2 cm) Weight: 122.5 kg (270 lb 1 oz), General Appearance: Well nourished, well developed, in no acute distress.  Neurological: Mood & affect are normal.  Shoulder exam: left  Tenderness: biceps tendon, lateral acromial, posterior acromial, deltoid muscle  ROM: " forward flexion 180/180, extension 45/45, full abduction 180/180, abduction-glenohumeral 90/90, external rotation 50/50  Shoulder Strength: biceps 5/5, triceps 5/5, abduction 4/5, adduction 4/5, flexion 4/5, extension 5/5  positive for tenderness about the glenohumeral joint, positive for tenderness over the acromioclavicular joint and negative for impingement sign  Special Tests:Cross-chest abduction: diffuse pain      Shoulder exam: right  Tenderness: biceps tendon, lateral acromial, posterior acromial  ROM: forward flexion 180/180, extension 45/45, full abduction 180/180, abduction-glenohumeral 90/90, external rotation 50/50  Shoulder Strength: biceps 5/5, triceps 5/5, abduction 5/5, adduction 4/5, flexion 4/5, extension 5/5  negative for tenderness about the glenohumeral joint, positive for tenderness over the acromioclavicular joint and negative for impingement sign  Special Tests:Cross-chest abduction: negative                                  RADIOGRAPHS:  X-rays taken today films reviewed by me demonstrate mild to moderate arthritic changes throughout both shoulders left more so than with glenohumeral joint space narrowing early osteophytic spurring no evidence of fracture dislocation    ASSESSMENT/PLAN:       ICD-10-CM ICD-9-CM   1. Bilateral shoulder pain, unspecified chronicity  M25.511 719.41    M25.512    2. Weakness of both upper extremities  R29.898 729.89   3. S/P cervical spinal fusion  Z98.1 V45.4       Plan: We discussed with the patient at length all the different treatment options available for his rightshoulder including anti-inflammatories, acetaminophen, rest, ice, Physical therapy to include strengthening exercise, occasional cortisone injections for temporary relief, arthroscopic surgical repair, and finally shoulder arthroplasty.   Will proceed with therapeutic diagnostic cortisone injection of the right shoulder      The injection site was identified and the skin was prepared with an ETOH  solution. The    right  shoulder was injected with 1 ml of Celestone and 5 ml Lidocaine under sterile technique. Elmer Cadena Gissell tolerated the procedure well, he was advised to rest the  shoulder  today, ice and support. he did receive immediate relief of the pain in and about his  shoulder  he was told this would be short lived and is secondary to the lidocaine. he may have an increase in his discomfort tonight followed by steady improvement over the next several days. It may take 1-3 weeks following the injection to get the full benefit of the medication.  I will see him back in 3-6 months. Sooner if he has any problems or concerns.

## 2020-11-30 ENCOUNTER — LAB VISIT (OUTPATIENT)
Dept: LAB | Facility: HOSPITAL | Age: 65
End: 2020-11-30
Payer: MEDICARE

## 2020-11-30 DIAGNOSIS — Z96.659 STATUS POST KNEE REPLACEMENT, UNSPECIFIED LATERALITY: ICD-10-CM

## 2020-11-30 DIAGNOSIS — Z96.659 STATUS POST KNEE REPLACEMENT, UNSPECIFIED LATERALITY: Primary | ICD-10-CM

## 2020-11-30 PROCEDURE — 86353 LYMPHOCYTE TRANSFORMATION: CPT | Mod: 91

## 2020-11-30 PROCEDURE — 36415 COLL VENOUS BLD VENIPUNCTURE: CPT

## 2020-11-30 NOTE — PROGRESS NOTES
Pt had a right knee replacement last year. He would like to participate in the research study for metal allergy. Consent completed and signed by the patient. He will go to the 2nd floor for the lab work. His wife will return next Monday for her next injection as scheduled.

## 2020-12-01 ENCOUNTER — DOCUMENTATION ONLY (OUTPATIENT)
Dept: RESEARCH | Facility: HOSPITAL | Age: 65
End: 2020-12-01

## 2020-12-01 NOTE — PROGRESS NOTES
Date Consent signed: 11-    Study Title/IRB Number: Nickel Allergy Implications in Total Joint Arthroplasty    : Dr. Schultz    Prior to the Informed Consent (IC) being signed, or any study protocol required data collection, testing, procedure, or intervention being performed, the following was done and/or discussed:   Patient was given a copy of the IC for review    Purpose of the study and qualifications to participate    Study design, Follow up schedule, and tests or procedures done at each visit   Confidentiality and HIPAA Authorization for Release of Medical Records for the research trial/ subject's rights/research related injury   Risk, Benefits, Alternative Treatments, Compensation and Costs   Participation in the research trial is voluntary and patient may withdraw at anytime   Contact information for study related questions      Renu Mcgee NP, consented the patient.    Renu reviewed each page of the consent form  family) and all questions answered satisfactorily. The patient signed the consent form and received a copy of same. The original consent was scanned into electronic medical records (EPIC) and filed into the subject's research study binder. The plan was to mail the ClinCard, but the patient prefers to receive the card in person when he returns to the clinic with his wife next Monday.

## 2021-01-12 LAB — METAL SENSITIVITY TESTING, METALS ONLY: NORMAL

## 2021-03-24 ENCOUNTER — TELEPHONE (OUTPATIENT)
Dept: HEPATOLOGY | Facility: CLINIC | Age: 66
End: 2021-03-24

## 2021-04-26 ENCOUNTER — TELEPHONE (OUTPATIENT)
Dept: HEPATOLOGY | Facility: CLINIC | Age: 66
End: 2021-04-26

## 2021-05-03 ENCOUNTER — OFFICE VISIT (OUTPATIENT)
Dept: HEPATOLOGY | Facility: CLINIC | Age: 66
End: 2021-05-03
Payer: MEDICARE

## 2021-05-03 ENCOUNTER — PROCEDURE VISIT (OUTPATIENT)
Dept: HEPATOLOGY | Facility: CLINIC | Age: 66
End: 2021-05-03
Payer: MEDICARE

## 2021-05-03 VITALS
WEIGHT: 280.44 LBS | HEART RATE: 72 BPM | BODY MASS INDEX: 44.02 KG/M2 | SYSTOLIC BLOOD PRESSURE: 155 MMHG | DIASTOLIC BLOOD PRESSURE: 76 MMHG | HEIGHT: 67 IN | RESPIRATION RATE: 18 BRPM

## 2021-05-03 DIAGNOSIS — K76.0 FATTY LIVER: Primary | ICD-10-CM

## 2021-05-03 DIAGNOSIS — R74.8 ELEVATED LIVER ENZYMES: ICD-10-CM

## 2021-05-03 DIAGNOSIS — E78.2 MIXED HYPERLIPIDEMIA: ICD-10-CM

## 2021-05-03 DIAGNOSIS — I10 ESSENTIAL HYPERTENSION: ICD-10-CM

## 2021-05-03 DIAGNOSIS — K76.0 FATTY LIVER: ICD-10-CM

## 2021-05-03 DIAGNOSIS — E66.9 OBESITY (BMI 30-39.9): ICD-10-CM

## 2021-05-03 DIAGNOSIS — F10.10 EXCESSIVE DRINKING ALCOHOL: ICD-10-CM

## 2021-05-03 DIAGNOSIS — K74.00 LIVER FIBROSIS: ICD-10-CM

## 2021-05-03 PROCEDURE — 91200 FIBROSCAN (VIBRATION CONTROLLED TRANSIENT ELASTOGRAPHY): ICD-10-PCS | Mod: 26,S$PBB,, | Performed by: NURSE PRACTITIONER

## 2021-05-03 PROCEDURE — 99214 PR OFFICE/OUTPT VISIT, EST, LEVL IV, 30-39 MIN: ICD-10-PCS | Mod: S$PBB,,, | Performed by: NURSE PRACTITIONER

## 2021-05-03 PROCEDURE — 99999 PR PBB SHADOW E&M-EST. PATIENT-LVL V: CPT | Mod: PBBFAC,,, | Performed by: NURSE PRACTITIONER

## 2021-05-03 PROCEDURE — 99215 OFFICE O/P EST HI 40 MIN: CPT | Mod: PBBFAC,25 | Performed by: NURSE PRACTITIONER

## 2021-05-03 PROCEDURE — 91200 LIVER ELASTOGRAPHY: CPT | Mod: 26,S$PBB,, | Performed by: NURSE PRACTITIONER

## 2021-05-03 PROCEDURE — 99999 PR PBB SHADOW E&M-EST. PATIENT-LVL V: ICD-10-PCS | Mod: PBBFAC,,, | Performed by: NURSE PRACTITIONER

## 2021-05-03 PROCEDURE — 99214 OFFICE O/P EST MOD 30 MIN: CPT | Mod: S$PBB,,, | Performed by: NURSE PRACTITIONER

## 2021-05-03 PROCEDURE — 91200 LIVER ELASTOGRAPHY: CPT | Mod: PBBFAC | Performed by: NURSE PRACTITIONER

## 2021-05-04 PROBLEM — K74.00 LIVER FIBROSIS: Status: ACTIVE | Noted: 2021-05-04

## 2021-05-11 ENCOUNTER — PATIENT MESSAGE (OUTPATIENT)
Dept: HEPATOLOGY | Facility: CLINIC | Age: 66
End: 2021-05-11

## 2021-06-23 ENCOUNTER — PATIENT MESSAGE (OUTPATIENT)
Dept: RESEARCH | Facility: HOSPITAL | Age: 66
End: 2021-06-23

## 2021-09-30 ENCOUNTER — TELEPHONE (OUTPATIENT)
Dept: ORTHOPEDICS | Facility: CLINIC | Age: 66
End: 2021-09-30

## 2021-09-30 DIAGNOSIS — Z98.1 S/P CERVICAL SPINAL FUSION: Primary | ICD-10-CM

## 2021-10-11 ENCOUNTER — OFFICE VISIT (OUTPATIENT)
Dept: ORTHOPEDICS | Facility: CLINIC | Age: 66
End: 2021-10-11
Payer: MEDICARE

## 2021-10-11 ENCOUNTER — HOSPITAL ENCOUNTER (OUTPATIENT)
Dept: RADIOLOGY | Facility: HOSPITAL | Age: 66
Discharge: HOME | End: 2021-10-11
Attending: PHYSICIAN ASSISTANT
Payer: MEDICARE

## 2021-10-11 VITALS — HEIGHT: 67 IN | BODY MASS INDEX: 41.89 KG/M2 | WEIGHT: 266.88 LBS

## 2021-10-11 DIAGNOSIS — Z98.1 S/P CERVICAL SPINAL FUSION: Primary | ICD-10-CM

## 2021-10-11 DIAGNOSIS — Z98.1 S/P CERVICAL SPINAL FUSION: ICD-10-CM

## 2021-10-11 PROCEDURE — 99213 OFFICE O/P EST LOW 20 MIN: CPT | Mod: S$PBB,,, | Performed by: PHYSICIAN ASSISTANT

## 2021-10-11 PROCEDURE — 72040 XR CERVICAL SPINE AP LATERAL: ICD-10-PCS | Mod: 26,,, | Performed by: RADIOLOGY

## 2021-10-11 PROCEDURE — 72040 X-RAY EXAM NECK SPINE 2-3 VW: CPT | Mod: TC

## 2021-10-11 PROCEDURE — 99999 PR PBB SHADOW E&M-EST. PATIENT-LVL IV: CPT | Mod: PBBFAC,,, | Performed by: PHYSICIAN ASSISTANT

## 2021-10-11 PROCEDURE — 99213 PR OFFICE/OUTPT VISIT, EST, LEVL III, 20-29 MIN: ICD-10-PCS | Mod: S$PBB,,, | Performed by: PHYSICIAN ASSISTANT

## 2021-10-11 PROCEDURE — 72040 X-RAY EXAM NECK SPINE 2-3 VW: CPT | Mod: 26,,, | Performed by: RADIOLOGY

## 2021-10-11 PROCEDURE — 99214 OFFICE O/P EST MOD 30 MIN: CPT | Mod: PBBFAC | Performed by: PHYSICIAN ASSISTANT

## 2021-10-11 PROCEDURE — 99999 PR PBB SHADOW E&M-EST. PATIENT-LVL IV: ICD-10-PCS | Mod: PBBFAC,,, | Performed by: PHYSICIAN ASSISTANT

## 2021-10-11 RX ORDER — DIAZEPAM 5 MG/1
5 TABLET ORAL
Qty: 2 TABLET | Refills: 0 | Status: SHIPPED | OUTPATIENT
Start: 2021-10-11 | End: 2021-10-12

## 2021-10-13 ENCOUNTER — PATIENT MESSAGE (OUTPATIENT)
Dept: ORTHOPEDICS | Facility: CLINIC | Age: 66
End: 2021-10-13

## 2021-10-13 ENCOUNTER — PATIENT MESSAGE (OUTPATIENT)
Dept: ORTHOPEDICS | Facility: CLINIC | Age: 66
End: 2021-10-13
Payer: MEDICARE

## 2021-10-13 RX ORDER — DIAZEPAM 5 MG/1
5 TABLET ORAL EVERY 6 HOURS PRN
Qty: 2 TABLET | Refills: 0 | Status: SHIPPED | OUTPATIENT
Start: 2021-10-13 | End: 2021-11-03 | Stop reason: SDUPTHER

## 2021-10-15 ENCOUNTER — TELEPHONE (OUTPATIENT)
Dept: HEPATOLOGY | Facility: CLINIC | Age: 66
End: 2021-10-15

## 2021-10-20 ENCOUNTER — PATIENT MESSAGE (OUTPATIENT)
Dept: HEPATOLOGY | Facility: CLINIC | Age: 66
End: 2021-10-20
Payer: MEDICARE

## 2021-10-20 DIAGNOSIS — R74.8 ELEVATED LIVER ENZYMES: ICD-10-CM

## 2021-10-20 DIAGNOSIS — K76.0 FATTY LIVER: Primary | ICD-10-CM

## 2021-10-20 DIAGNOSIS — K74.00 LIVER FIBROSIS: ICD-10-CM

## 2021-10-29 ENCOUNTER — HOSPITAL ENCOUNTER (OUTPATIENT)
Dept: RADIOLOGY | Facility: HOSPITAL | Age: 66
Discharge: HOME | End: 2021-10-29
Attending: NURSE PRACTITIONER
Payer: MEDICARE

## 2021-10-29 ENCOUNTER — HOSPITAL ENCOUNTER (OUTPATIENT)
Dept: RADIOLOGY | Facility: HOSPITAL | Age: 66
Discharge: HOME | End: 2021-10-29
Attending: PHYSICIAN ASSISTANT
Payer: MEDICARE

## 2021-10-29 DIAGNOSIS — Z98.1 S/P CERVICAL SPINAL FUSION: ICD-10-CM

## 2021-10-29 DIAGNOSIS — K76.0 FATTY LIVER: ICD-10-CM

## 2021-10-29 DIAGNOSIS — K74.00 LIVER FIBROSIS: ICD-10-CM

## 2021-10-29 DIAGNOSIS — R74.8 ELEVATED LIVER ENZYMES: ICD-10-CM

## 2021-10-29 PROCEDURE — 76391 MR ELASTOGRAPHY: ICD-10-PCS | Mod: 26,,, | Performed by: INTERNAL MEDICINE

## 2021-10-29 PROCEDURE — 76391 MR ELASTOGRAPHY: CPT | Mod: TC

## 2021-10-29 PROCEDURE — 76391 MR ELASTOGRAPHY: CPT | Mod: 26,,, | Performed by: INTERNAL MEDICINE

## 2021-11-03 ENCOUNTER — PATIENT MESSAGE (OUTPATIENT)
Dept: ORTHOPEDICS | Facility: CLINIC | Age: 66
End: 2021-11-03
Payer: MEDICARE

## 2021-11-03 RX ORDER — DIAZEPAM 5 MG/1
5 TABLET ORAL
Qty: 2 TABLET | Refills: 0 | Status: SHIPPED | OUTPATIENT
Start: 2021-11-03 | End: 2021-12-16

## 2021-11-10 ENCOUNTER — OFFICE VISIT (OUTPATIENT)
Dept: HEPATOLOGY | Facility: CLINIC | Age: 66
End: 2021-11-10
Payer: MEDICARE

## 2021-11-10 ENCOUNTER — TELEPHONE (OUTPATIENT)
Dept: HEPATOLOGY | Facility: CLINIC | Age: 66
End: 2021-11-10

## 2021-11-10 VITALS — BODY MASS INDEX: 43.47 KG/M2 | HEIGHT: 67 IN | WEIGHT: 277 LBS

## 2021-11-10 DIAGNOSIS — I10 PRIMARY HYPERTENSION: ICD-10-CM

## 2021-11-10 DIAGNOSIS — E78.2 MIXED HYPERLIPIDEMIA: ICD-10-CM

## 2021-11-10 DIAGNOSIS — R74.8 ELEVATED LIVER ENZYMES: ICD-10-CM

## 2021-11-10 DIAGNOSIS — E66.01 MORBID OBESITY WITH BMI OF 40.0-44.9, ADULT: ICD-10-CM

## 2021-11-10 DIAGNOSIS — K76.0 FATTY LIVER: Primary | ICD-10-CM

## 2021-11-10 PROBLEM — K74.00 LIVER FIBROSIS: Status: RESOLVED | Noted: 2021-05-04 | Resolved: 2021-11-10

## 2021-11-10 PROBLEM — E66.9 OBESITY (BMI 30-39.9): Status: RESOLVED | Noted: 2019-10-02 | Resolved: 2021-11-10

## 2021-11-10 PROCEDURE — 99214 PR OFFICE/OUTPT VISIT, EST, LEVL IV, 30-39 MIN: ICD-10-PCS | Mod: 95,,, | Performed by: NURSE PRACTITIONER

## 2021-11-10 PROCEDURE — 99214 OFFICE O/P EST MOD 30 MIN: CPT | Mod: 95,,, | Performed by: NURSE PRACTITIONER

## 2021-11-19 ENCOUNTER — TELEPHONE (OUTPATIENT)
Dept: NEUROLOGY | Facility: CLINIC | Age: 66
End: 2021-11-19
Payer: MEDICARE

## 2021-12-23 ENCOUNTER — OFFICE VISIT (OUTPATIENT)
Dept: NEUROLOGY | Facility: CLINIC | Age: 66
End: 2021-12-23
Payer: MEDICARE

## 2021-12-23 ENCOUNTER — LAB VISIT (OUTPATIENT)
Dept: LAB | Facility: HOSPITAL | Age: 66
End: 2021-12-23
Attending: STUDENT IN AN ORGANIZED HEALTH CARE EDUCATION/TRAINING PROGRAM
Payer: MEDICARE

## 2021-12-23 VITALS
WEIGHT: 279.88 LBS | HEIGHT: 67 IN | HEART RATE: 76 BPM | BODY MASS INDEX: 43.93 KG/M2 | SYSTOLIC BLOOD PRESSURE: 133 MMHG | DIASTOLIC BLOOD PRESSURE: 69 MMHG

## 2021-12-23 DIAGNOSIS — R73.03 PREDIABETES: ICD-10-CM

## 2021-12-23 DIAGNOSIS — G60.9 HEREDITARY AND IDIOPATHIC NEUROPATHY, UNSPECIFIED: ICD-10-CM

## 2021-12-23 DIAGNOSIS — H91.93 BILATERAL HEARING LOSS, UNSPECIFIED HEARING LOSS TYPE: ICD-10-CM

## 2021-12-23 DIAGNOSIS — I67.2 CEREBRAL ATHEROSCLEROSIS: ICD-10-CM

## 2021-12-23 DIAGNOSIS — H93.A9 PULSATILE TINNITUS: ICD-10-CM

## 2021-12-23 DIAGNOSIS — R26.89 BALANCE DISORDER: ICD-10-CM

## 2021-12-23 DIAGNOSIS — G62.9 NEUROPATHY: Primary | ICD-10-CM

## 2021-12-23 DIAGNOSIS — G62.9 NEUROPATHY: ICD-10-CM

## 2021-12-23 LAB
ESTIMATED AVG GLUCOSE: 123 MG/DL (ref 68–131)
HBA1C MFR BLD: 5.9 % (ref 4–5.6)

## 2021-12-23 PROCEDURE — 82525 ASSAY OF COPPER: CPT | Performed by: STUDENT IN AN ORGANIZED HEALTH CARE EDUCATION/TRAINING PROGRAM

## 2021-12-23 PROCEDURE — 99204 OFFICE O/P NEW MOD 45 MIN: CPT | Mod: S$PBB,,, | Performed by: STUDENT IN AN ORGANIZED HEALTH CARE EDUCATION/TRAINING PROGRAM

## 2021-12-23 PROCEDURE — 84446 ASSAY OF VITAMIN E: CPT | Performed by: STUDENT IN AN ORGANIZED HEALTH CARE EDUCATION/TRAINING PROGRAM

## 2021-12-23 PROCEDURE — 84165 PROTEIN E-PHORESIS SERUM: CPT | Performed by: STUDENT IN AN ORGANIZED HEALTH CARE EDUCATION/TRAINING PROGRAM

## 2021-12-23 PROCEDURE — 36415 COLL VENOUS BLD VENIPUNCTURE: CPT | Performed by: STUDENT IN AN ORGANIZED HEALTH CARE EDUCATION/TRAINING PROGRAM

## 2021-12-23 PROCEDURE — 99215 OFFICE O/P EST HI 40 MIN: CPT | Mod: PBBFAC | Performed by: STUDENT IN AN ORGANIZED HEALTH CARE EDUCATION/TRAINING PROGRAM

## 2021-12-23 PROCEDURE — 82746 ASSAY OF FOLIC ACID SERUM: CPT | Performed by: STUDENT IN AN ORGANIZED HEALTH CARE EDUCATION/TRAINING PROGRAM

## 2021-12-23 PROCEDURE — 84207 ASSAY OF VITAMIN B-6: CPT | Performed by: STUDENT IN AN ORGANIZED HEALTH CARE EDUCATION/TRAINING PROGRAM

## 2021-12-23 PROCEDURE — 83036 HEMOGLOBIN GLYCOSYLATED A1C: CPT | Performed by: STUDENT IN AN ORGANIZED HEALTH CARE EDUCATION/TRAINING PROGRAM

## 2021-12-23 PROCEDURE — 99999 PR PBB SHADOW E&M-EST. PATIENT-LVL V: ICD-10-PCS | Mod: PBBFAC,,, | Performed by: STUDENT IN AN ORGANIZED HEALTH CARE EDUCATION/TRAINING PROGRAM

## 2021-12-23 PROCEDURE — 99999 PR PBB SHADOW E&M-EST. PATIENT-LVL V: CPT | Mod: PBBFAC,,, | Performed by: STUDENT IN AN ORGANIZED HEALTH CARE EDUCATION/TRAINING PROGRAM

## 2021-12-23 PROCEDURE — 99204 PR OFFICE/OUTPT VISIT, NEW, LEVL IV, 45-59 MIN: ICD-10-PCS | Mod: S$PBB,,, | Performed by: STUDENT IN AN ORGANIZED HEALTH CARE EDUCATION/TRAINING PROGRAM

## 2021-12-23 NOTE — PATIENT INSTRUCTIONS
Consult to ear doctor  Labwork today to check for neuropathy causes  Carotid doppler   Vestibular therapy for balance training   Follow up in 4 weeks virtually

## 2021-12-23 NOTE — PROGRESS NOTES
[facility]  New Patient Evaluation    He is referred to me for neurological consultation for dizziness.    History of present illness:    The patient is presenting with dizziness after his C-spine surgery 7/28/2020 (C3-C6 fusion and laminectomy). It is described as sensitivity to movement and lightheadedness, he would lose balance if he turned too quickly or if he was bending over to  something. He also has orthostasis on top of that.   He has a cane for fall prevention since the neck surgery.   He has hearing loss that is worse over the past few years, he has constant tinnitus since years ago but recently started having pulsatile tinnitus.  He also has poor short term memory since neck surgery. Long term memory is intact.     He has issue with hand dexterity and finds it harder to button buttons. He also find it hard to stand from a seated position. He has trouble climbing stairs.   He denies tingling or numbness in his feet.   He denies double vision. He has occasional blurry vision if he stared at screen for too long.     MRI 12/20/21 showed No acute intracranial abnormalities. There is a small focus of increased T2 signal left periventricular region of questionable significance, most likely chronic small vessel ischemic change  MRA 12/20/21 normal     He hasn't been able to exercise since his neck surgery. He complains of neck soreness and fatigue.   He goes to PT once a week since his neck fusion.   He takes cough drops all the time.     Review of systems:   A complete 9 system ROS was reviewed by me   Past Medical History:   Diagnosis Date    Actinic keratosis     Arthritis     BPH (benign prostatic hyperplasia)     Cataract     Coronary artery disease     Fatty liver     Heart attack     Hyperlipidemia     Hypertension     Kidney stone     Morbid obesity with BMI of 40.0-44.9, adult 10/2/2019    Obesity (BMI 30-39.9) 10/2/2019    GEOVANNA on CPAP     Renal calculi     Subclinical  hypothyroidism        Past Surgical History:   Procedure Laterality Date    JOINT REPLACEMENT      left knee    KNEE SURGERY      LEFT HEART CATHETERIZATION Left 11/27/2019    Procedure: Left heart cath;  Surgeon: Dejan Nielsen MD;  Location: [facility] CATH;  Service: Cardiovascular;  Laterality: Left;    PERCUTANEOUS CRYOTHERAPY OF PERIPHERAL NERVE USING LIQUID NITROUS OXIDE IN CLOSED NEEDLE DEVICE Right 8/26/2019    Procedure: CRYOTHERAPY, NERVE, PERIPHERAL, PERCUTANEOUS, USING LIQUID NITROUS OXIDE IN CLOSED NEEDLE DEVICE;  Surgeon: CHYNA Quevedo;  Location: [facility] CATH LAB;  Service: Pain Management;  Laterality: Right;  Right Knee IOVERA    POSTERIOR FUSION OF CERVICAL SPINE WITH LAMINECTOMY N/A 7/28/2020    Procedure: LAMINECTOMY, SPINE, CERVICAL, WITH POSTERIOR FUSION C3-6 Depuy SNS:Motors/SSEP/EMG Old Jer + Pads + tongs ;  Surgeon: Abraham Dang MD;  Location: [facility] OR [address] [place] [address];  Service: Orthopedics;  Laterality: N/A;    right knee replacement       SPINE SURGERY      around 2010     TOTAL KNEE ARTHROPLASTY Right 9/5/2019    Procedure: ARTHROPLASTY, KNEE, TOTAL-VINCENZO-SAME DAY;  Surgeon: Arturo Schultz MD;  Location: [facility] OR [address] [place] [address];  Service: Orthopedics;  Laterality: Right;       Family History   Problem Relation Age of Onset    Heart disease Father     Hypertension Father     Cancer Paternal Grandfather         Prostate cancer    Hyperlipidemia Mother     Hypertension Mother     Hypertension Sister     Heart disease Brother     Cirrhosis Neg Hx        Social History     Socioeconomic History    Marital status:     Number of children: 2   Tobacco Use    Smoking status: Never Smoker    Smokeless tobacco: Never Used   Substance and Sexual Activity    Alcohol use: Yes     Comment: on weekends     Drug use: Never    Sexual activity: Yes     Partners: Female       Review of patient's allergies indicates:   Allergen Reactions    Ace inhibitors Other (See  "Comments)     cough         Physical Exam    Vitals:    12/23/21 1448   BP: 133/69   Pulse: 76   Weight: 126.9 kg (279 lb 14 oz)   Height: 5' 7" (1.702 m)       In general, the patient is well nourished and appears to be in no acute distress.    MENTAL STATUS: language is fluent, normal verbal comprehension, short-term and remote memory is intact, attention is normal, patient is alert and oriented x 3, fund of knowlege is appropriate by vocabulary.     CRANIAL NERVE EXAM:  There is no intrernuclear ophthalmoplegia.  Extraocular muscles are intact. Pupils are equal, round, and reactive to light. No facial asymmetry. Facial sensation is intact bilaterally. There is no dysarthria. Uvula is midline, and palate moves symmetrically. Shoulder shrug intact bilaterlly. Tongue protrusion is midline. Hearing is intact to finger rub bilaterally. Neck is supple with full ROM  No Lhermitte's    MOTOR EXAM: Normal bulk and tone throughout UE and LE bilaterally.   No pronator drift; rapid sequential movements are normal; Strength is  5/5 in all groups in the lower extremities and upper extremities except L hip flexor 4/5, and R hip flexor 4+/5.     REFLEXES: 1+ and symmetric throughout in all four extremities    SENSORY EXAM: Normal to light touch throughout, decreased PP in b/l LE from knee down, and patchy in b/l UE distally, reduced vibration in R knee but intact in R toes.     COORDINATION: Normal finger-to-nose exam. Normal heel-to-shin exam.    GAIT:Wide based and unsteady, unable to perform stressed gait or tandem gait.    Positive romberg's.       IMAGING (personally reviewed):  MRI brain 7/2020: No acute intracranial abnormalities. Small focus of increased T2 signal left periventricular region of questionable significance, most likely chronic small vessel ischemic change    LABS:  Lab Results   Component Value Date    WBC 6.40 10/19/2021    HGB 14.7 10/19/2021    HCT 42.3 10/19/2021    MCV 90 10/19/2021     " 10/19/2021     CMP  Sodium   Date Value Ref Range Status   10/19/2021 140 136 - 145 mmol/L Final     Potassium   Date Value Ref Range Status   10/19/2021 4.2 3.5 - 5.1 mmol/L Final     Chloride   Date Value Ref Range Status   10/19/2021 99 95 - 110 mmol/L Final     CO2   Date Value Ref Range Status   10/19/2021 32 (H) 23 - 29 mmol/L Final     Glucose   Date Value Ref Range Status   10/19/2021 100 74 - 106 mg/dL Final     BUN   Date Value Ref Range Status   10/19/2021 14 9 - 20 mg/dL Final     Creatinine   Date Value Ref Range Status   10/19/2021 0.76 (L) 0.80 - 1.50 mg/dL Final     Calcium   Date Value Ref Range Status   10/19/2021 9.5 8.4 - 10.2 mg/dL Final     Total Protein   Date Value Ref Range Status   10/19/2021 7.7 6.3 - 8.2 g/dL Final     Albumin   Date Value Ref Range Status   10/19/2021 4.3 3.5 - 5.2 g/dL Final     Total Bilirubin   Date Value Ref Range Status   10/19/2021 0.5 0.2 - 1.3 mg/dL Final     Alkaline Phosphatase   Date Value Ref Range Status   10/19/2021 67 38 - 145 U/L Final     AST   Date Value Ref Range Status   10/19/2021 33 17 - 59 U/L Final     ALT   Date Value Ref Range Status   10/19/2021 31 10 - 44 U/L Final     Anion Gap   Date Value Ref Range Status   07/30/2020 9 8 - 16 mmol/L Final     eGFR if    Date Value Ref Range Status   10/19/2021 >60 >60 mL/min/1.73 m^2 Final     eGFR if non    Date Value Ref Range Status   10/19/2021 >60 >60 mL/min/1.73 m^2 Final     Comment:     Calculation used to obtain the estimated glomerular filtration  rate (eGFR) is the CKD-EPI equation.                 ASSESSMENT:        His dizziness is multifactorial, suspect orthostasis, C-spine instability, peripheral neuropathy, and possible inner ear pathology in combination. MRI brain without significant abnormalities besides mild microvascular disease consistent with age and cerebrovascular risk factors.      RECOMMENDATIONS:  Consult to ENT for hearing loss  Labwork to rule  out contributing tiologies  Carotid doppler for pulsatile tinnitus  Vestibular therapy  Follow up in 4 weeks virtually (gen neuro)       Elizabeth Camacho MD, MSc  Attending neurologist       Total time spent with the patient: 45 minutes, 35 minutes of face to face consultation and 10 minutes of chart review and coordination of care, on the day of the visit. This includes face to face time and non-face to face time preparing to see the patient (eg, review of tests), obtaining and/or reviewing separately obtained history, documenting clinical information in the electronic or other health record, independently interpreting resultsand communicating results to the patient/family/caregiver, or care coordination.

## 2021-12-24 LAB — FOLATE SERPL-MCNC: 18.8 NG/ML (ref 4–24)

## 2021-12-29 LAB
ALBUMIN SERPL ELPH-MCNC: NORMAL G/DL
ALPHA1 GLOB SERPL ELPH-MCNC: NORMAL G/DL
ALPHA2 GLOB SERPL ELPH-MCNC: NORMAL G/DL
B-GLOBULIN SERPL ELPH-MCNC: NORMAL G/DL
COPPER SERPL-MCNC: 1158 UG/L (ref 665–1480)
GAMMA GLOB SERPL ELPH-MCNC: NORMAL G/DL
PROT SERPL-MCNC: 7.7 G/DL (ref 6–8.4)

## 2021-12-30 LAB
A-TOCOPHEROL VIT E SERPL-MCNC: 1192 UG/DL (ref 500–1800)
MAYO MISCELLANEOUS RESULT (REF): NORMAL
PYRIDOXAL SERPL-MCNC: 38 UG/L (ref 5–50)

## 2022-01-21 ENCOUNTER — OFFICE VISIT (OUTPATIENT)
Dept: NEUROLOGY | Facility: CLINIC | Age: 67
End: 2022-01-21
Payer: MEDICARE

## 2022-01-21 DIAGNOSIS — R42 DIZZINESS: Primary | ICD-10-CM

## 2022-01-21 PROCEDURE — 99213 OFFICE O/P EST LOW 20 MIN: CPT | Mod: 95,,, | Performed by: STUDENT IN AN ORGANIZED HEALTH CARE EDUCATION/TRAINING PROGRAM

## 2022-01-21 PROCEDURE — 99213 PR OFFICE/OUTPT VISIT, EST, LEVL III, 20-29 MIN: ICD-10-PCS | Mod: 95,,, | Performed by: STUDENT IN AN ORGANIZED HEALTH CARE EDUCATION/TRAINING PROGRAM

## 2022-01-21 RX ORDER — PERPHENAZINE 16 MG
1 TABLET ORAL DAILY
Qty: 90 EACH | Refills: 3 | Status: SHIPPED | OUTPATIENT
Start: 2022-01-21 | End: 2023-07-27 | Stop reason: SDUPTHER

## 2022-01-21 NOTE — PROGRESS NOTES
[facility]  Follow Up Patient Visit Virtual    The patient location is: home  The chief complaint leading to consultation is: dizziness  Visit type: Virtual visit with synchronous audio and video    Each patient to whom he or she provides medical services by telemedicine is:  (1) informed of the relationship between the physician and patient and the respective role of any other health care provider with respect to management of the patient; and (2) notified that he or she may decline to receive medical services by telemedicine and may withdraw from such care at any time.          Interval history:     His PT is going to start vestibular therapy for him.  He had ENT appointment and was found to have mild hearing loss that is worse compared to last visit and he was offered option of hearing aids but he is holding off on this duet cost vs. benefit considerations.  Carotid ultrasound was normal  MRI and MRA without acute abnormalities. He does have microvascular disease mild.  Labwork for neuropathy workup reveal elevated HgbA1C to 5.9       ROS:     SOCIAL HISTORY  Social History     Tobacco Use    Smoking status: Never Smoker    Smokeless tobacco: Never Used   Substance Use Topics    Alcohol use: Yes     Comment: on weekends     Drug use: Never     Living arrangements - the patient lives with their family.      Exam:     Vitals unable to be obtained virtually       In general, the patient is well nourished and appears to be in no acute distress.          MENTAL STATUS: language is fluent, normal verbal comprehension, attention is normal, fund of knowlege is appropriate by vocabulary.       Imaging (personally reviewed):     MRI Brain 12/20/21: No acute intracranial abnormalities  Small focus of increased T2 signal left periventricular region of questionable significance, most likely chronic small vessel ischemic change     MRA Brain 12/20/21: Unremarkable cerebral MR angiogram    Labs:     Lab Results    Component Value Date    FXYOGPMT46ZD 48.1 10/12/2021    YGWUMBVB30ZN 39 07/09/2019    WXFEXXYF55LY 39 07/26/2018     No results found for: JCVINDEX, JCVANTIBODY  No results found for: SK3OBXOA, ABSOLUTECD3, PY9ELBGS, ABSOLUTECD8, OF0PDJJH, ABSOLUTECD4, LABCD48  Lab Results   Component Value Date    WBC 6.40 10/19/2021    RBC 4.70 10/19/2021    HGB 14.7 10/19/2021    HCT 42.3 10/19/2021    MCV 90 10/19/2021    MCH 31.2 (H) 10/19/2021    MCHC 34.7 10/19/2021    RDW 13.5 10/19/2021     10/19/2021    MPV 8.3 10/19/2021    GRAN 3.1 06/16/2021    GRAN 60.4 06/16/2021    LYMPH 1.3 06/16/2021    LYMPH 26.0 06/16/2021    MONO 0.5 06/16/2021    MONO 9.4 06/16/2021    EOS 0.2 06/16/2021    BASO 0.10 06/16/2021    EOSINOPHIL 3.2 06/16/2021    BASOPHIL 1.0 06/16/2021     Sodium   Date Value Ref Range Status   10/19/2021 140 136 - 145 mmol/L Final     Potassium   Date Value Ref Range Status   10/19/2021 4.2 3.5 - 5.1 mmol/L Final     Chloride   Date Value Ref Range Status   10/19/2021 99 95 - 110 mmol/L Final     CO2   Date Value Ref Range Status   10/19/2021 32 (H) 23 - 29 mmol/L Final     Glucose   Date Value Ref Range Status   10/19/2021 100 74 - 106 mg/dL Final     BUN   Date Value Ref Range Status   10/19/2021 14 9 - 20 mg/dL Final     Creatinine   Date Value Ref Range Status   10/19/2021 0.76 (L) 0.80 - 1.50 mg/dL Final     Calcium   Date Value Ref Range Status   10/19/2021 9.5 8.4 - 10.2 mg/dL Final     Total Protein   Date Value Ref Range Status   10/19/2021 7.7 6.3 - 8.2 g/dL Final     Albumin   Date Value Ref Range Status   10/19/2021 4.3 3.5 - 5.2 g/dL Final     Total Bilirubin   Date Value Ref Range Status   10/19/2021 0.5 0.2 - 1.3 mg/dL Final     Alkaline Phosphatase   Date Value Ref Range Status   10/19/2021 67 38 - 145 U/L Final     AST   Date Value Ref Range Status   10/19/2021 33 17 - 59 U/L Final     ALT   Date Value Ref Range Status   10/19/2021 31 10 - 44 U/L Final     Anion Gap   Date Value Ref  Range Status   07/30/2020 9 8 - 16 mmol/L Final     eGFR if    Date Value Ref Range Status   10/19/2021 >60 >60 mL/min/1.73 m^2 Final     eGFR if non    Date Value Ref Range Status   10/19/2021 >60 >60 mL/min/1.73 m^2 Final     Comment:     Calculation used to obtain the estimated glomerular filtration  rate (eGFR) is the CKD-EPI equation.                   Diagnosis/Assessment/Plan:   His dizziness is multifactorial, orthostasis, C-spine instability, peripheral neuropathy (now fond to be likely contributed by elevated glucose, A1C 5.9), and possible inner ear pathology in combination. MRI brain without significant abnormalities besides mild microvascular disease consistent with age and cerebrovascular risk factors. MRA and carotid ultrasound without any vascular etiologies.    Script provided for vestibular therapy  Add alpha lipoic acid 600 mg a day for neuropathy  ENT follow-up for hearing loss                                    Follow up as needed    Total time spent with the patient: 25 minutes, 15 minutes of face to face consultation and 10 minutes of chart review and coordination of care, on the day of the visit. This includes face to face time and non-face to face time preparing to see the patient (eg, review of tests), obtaining and/or reviewing separately obtained history, documenting clinical information in the electronic or other health record, independently interpreting resultsand communicating results to the patient/family/caregiver, or care coordination.         Elizabeth Camacho MD, MSc  Attending neurologist

## 2022-01-21 NOTE — PATIENT INSTRUCTIONS
Alpha lipoic acid 600mg daily for nerve protection   Continue vestibular therapy (we will send updated script to your mailing address)

## 2022-03-02 ENCOUNTER — OFFICE VISIT (OUTPATIENT)
Dept: URGENT CARE | Facility: CLINIC | Age: 67
End: 2022-03-02
Payer: MEDICARE

## 2022-03-02 VITALS
BODY MASS INDEX: 43.63 KG/M2 | HEIGHT: 67 IN | SYSTOLIC BLOOD PRESSURE: 135 MMHG | WEIGHT: 278 LBS | OXYGEN SATURATION: 97 % | TEMPERATURE: 98 F | HEART RATE: 82 BPM | RESPIRATION RATE: 18 BRPM | DIASTOLIC BLOOD PRESSURE: 73 MMHG

## 2022-03-02 DIAGNOSIS — R05.9 COUGH: ICD-10-CM

## 2022-03-02 DIAGNOSIS — J06.9 ACUTE URI: Primary | ICD-10-CM

## 2022-03-02 DIAGNOSIS — R06.2 WHEEZING: ICD-10-CM

## 2022-03-02 PROCEDURE — 99214 PR OFFICE/OUTPT VISIT, EST, LEVL IV, 30-39 MIN: ICD-10-PCS | Mod: 25,S$GLB,, | Performed by: NURSE PRACTITIONER

## 2022-03-02 PROCEDURE — 71046 XR CHEST PA AND LATERAL: ICD-10-PCS | Mod: S$GLB,,, | Performed by: RADIOLOGY

## 2022-03-02 PROCEDURE — 94640 PR INHAL RX, AIRWAY OBST/DX SPUTUM INDUCT: ICD-10-PCS | Mod: S$GLB,,, | Performed by: NURSE PRACTITIONER

## 2022-03-02 PROCEDURE — 99214 OFFICE O/P EST MOD 30 MIN: CPT | Mod: 25,S$GLB,, | Performed by: NURSE PRACTITIONER

## 2022-03-02 PROCEDURE — 94640 AIRWAY INHALATION TREATMENT: CPT | Mod: S$GLB,,, | Performed by: NURSE PRACTITIONER

## 2022-03-02 PROCEDURE — 71046 X-RAY EXAM CHEST 2 VIEWS: CPT | Mod: S$GLB,,, | Performed by: RADIOLOGY

## 2022-03-02 RX ORDER — PROMETHAZINE HYDROCHLORIDE AND DEXTROMETHORPHAN HYDROBROMIDE 6.25; 15 MG/5ML; MG/5ML
5 SYRUP ORAL NIGHTLY PRN
Qty: 120 ML | Refills: 0 | Status: SHIPPED | OUTPATIENT
Start: 2022-03-02 | End: 2022-03-05

## 2022-03-02 RX ORDER — AZITHROMYCIN 250 MG/1
TABLET, FILM COATED ORAL
Qty: 6 TABLET | Refills: 0 | Status: SHIPPED | OUTPATIENT
Start: 2022-03-02 | End: 2022-03-09

## 2022-03-02 RX ORDER — ALBUTEROL SULFATE 0.83 MG/ML
2.5 SOLUTION RESPIRATORY (INHALATION)
Status: COMPLETED | OUTPATIENT
Start: 2022-03-02 | End: 2022-03-02

## 2022-03-02 RX ORDER — PREDNISONE 10 MG/1
10 TABLET ORAL DAILY
Qty: 5 TABLET | Refills: 0 | Status: SHIPPED | OUTPATIENT
Start: 2022-03-02 | End: 2022-03-07

## 2022-03-02 RX ORDER — ALBUTEROL SULFATE 90 UG/1
2 AEROSOL, METERED RESPIRATORY (INHALATION) EVERY 6 HOURS PRN
Qty: 6.7 G | Refills: 0 | Status: SHIPPED | OUTPATIENT
Start: 2022-03-02 | End: 2023-11-14 | Stop reason: SDUPTHER

## 2022-03-02 RX ADMIN — ALBUTEROL SULFATE 2.5 MG: 0.83 SOLUTION RESPIRATORY (INHALATION) at 12:03

## 2022-03-02 NOTE — PATIENT INSTRUCTIONS
The following are suggestions to help you with upper respiratory symptoms.    Congestion:    Nasal Saline  Nasal saline is available over the counter. There are several different commercial preparations such as Ocean spray and Ayr spray. There is no limit on the use of Nasal saline. Saline is used by snorting the mist up into the nose then later gently blowing the nose to get rid of any secretions that it has loosened.     Mucous Thinners and Decongestants  Mucous thinners and decongestants are used to shrink down the tissues and promote sinus drainage. There are multiple prescription and over-the-counter medications available. A mucous thinner will tend to be drying unless you are also drinking plenty of water when taking these. If you have high blood pressure, it is very important to monitor your pressure while on decongestants. The mucous thinner/decongestant combinations are typically given twice per day. However, some people will be unable to tolerate these at night and should only take them once per day.    Decongestant Nasal Sprays  Over-the-counter decongestant nasal spray such as Afrin, may be helpful as an initial step in treating upper respiratory infections. This spray can be used for up to approximately 3 days and is used no more than twice per day. Topical nasal decongestant spray for longer than 5 days will result in a physical addiction, in which the nasal lining will become significantly swollen and irritated until the spray is used again. May cause elevated blood pressure    Use pseudoephedrine (behind the counter)  for sinus pressure and congestion- Pseudoephedrine 30 mg up to 240 mg /day. Common brands include Sudafed, Zephrex-D Wal-phed.  Warning: It can raise your blood pressure and give you palpitations, avoid with history of high blood pressure, palpitations, and severe cardiac disease.  Coricidin HBP is okay to use if you have high blood pressure.     Nasal Steroids  Nasal steroid  medications such as Flonase are useful for upper respiratory infections, allergies, and sensitivities to airborne irritants. Unfortunately, they do not begin to work for 1-2 days, and they do not reach their maximum benefit for approximately 2-3 weeks. Initial therapy is typically 2 puffs per nostril twice per day. This should be used for only a few days, then the maintenance dosage is one or two puffs per nostril once per day. This can be done at any time of the day. The most effective way to use any nasal medication is to look down at your toes when spraying it in. Aim slightly away from the septum (dividing plate between the nostrils), and gently inhale. This ensures that the spray will go into the sinus cavities and not straight up into the nose. A good way to avoid spraying onto the septum is to use the right hand to spray into the left nostril, and vice versa for the right nostril. Occasionally, nasal steroids can increase the risk of nosebleeds, but in general they are very well tolerated. If you develop a bloody nose, stop using the medication immediately.    Clear Runny Nose/Allergic Rhinitis:  Use an antihistamine to help dry you out.   Antihistamines  Antihistamines are available both over-the-counter and as a prescription. There are also various decongestant and antihistamine combinations available such as Claritin, Allegra, and Zyrtec. It is best to take any antihistamine-decongestant combination in the morning to avoid insomnia. Zyrtec should probably be taken at night, in order to reduce the chance of sleepiness during the daytime. If there is a significant infection present and secretions are already thickened, it is recommended to discontinue antihistamines and use a mucous thinner/decongestant combination.      Oral Steroids  Oral steroids can be used with more sever infections. Often, they are the only medications that will reduce the symptoms of pressure and allow the nasal sinuses to drain.  These are best taken on a full stomach and earlier in the day is better. They may give you some irritability, stomach upset, or hyperactivity. This can also interfere with sleep.     A person who has high blood pressure or diabetes should be very careful to monitor their blood pressure or blood glucose while taking steroids. Steroids can have multiple side effects especially when taken long-term. Short-term doses are usually very well tolerated and extremely effective in controlling the symptoms associated with acute and chronic sinus infections and severe allergies. The use of steroids for greater than approximately seven days requires a tapering down in order to discontinue them. You should not abruptly stop your steroid if you have been taking the same dose for greater than one week.    Antibiotic Treatment  Finally, when all of these other measures have failed, and a bacterial infection is present, an antibiotic will be prescribed. The most common symptoms of acute sinusitis of a bacterial nature are pain, pressure, and thick and colored nasal drainage. However, not all colored drainage means that there is a bacterial infection present. According to the Center for Disease Control, only 2% of colds will progress to result in bacterial sinusitis. Most upper respiratory infections should NOT be treated with antibiotics. Antibiotics should be reserved for upper respiratory infections which last longer than 10 days, or which worsen after 5 to 7 days of treatment. The use of antibiotics for nonbacterial upper respiratory infections has resulted in a severe problem with the emergence of bacteria which are resistant to multiple forms of antibiotics, and some bacteria are currently only treatable with intravenous antibiotics.    Body Aches/Pains/Fever  For patients who are not allergic to and are not on anticoagulants, you can alternate Tylenol every 4 hours and Motrin every 6 hours for fever above 100.4F and/or pain.   For patients who are allergic or intolerant to NSAIDS, have gastritis, gastric ulcers, or history of GI bleeds, are pregnant, or are on anticoagulant therapy, you can take Tylenol every 4 hours as needed for fever above 100.4F and/or pain.     Maintain adequate hydration -  Rest and keep yourself/patient well hydrated. For adults, it is recommended to drink at least 8 glasses of water daily.  This may help thin secretions and soothe the respiratory mucosa.     Sore Throat  Perform warm, salt water gargles (1/2 tsp salt to 1 cup warm water) to help reduce inflammation and throat discomfort. Chloraseptic sprays and throat lozenges will also help with your throat pain.    COUGH  A viral cough may linger for 3 to 4 weeks but should steadily improve over time. Coughing is the body's natural way to clear mucus and help get rid of bacteria and viruses. Therefore, cough suppressants are usually not recommended.      Use mucinex (guaifenisin) up to 2400mg/day to help clear and break up/loosen mucus/congestion from the chest when you have a cold or flu.      Common cough suppressants include the ingredient dextromethorphan or DM, (such as Mucinex DM) available over-the-counter and can be used for cough to stop the tickle in the back of your throat.      ? Honey may be beneficial, especially on nocturnal cough 1 to 2 teaspoons can be taken straight or diluted in tea, juice or other liquid.    The antioxidants in honey are an important contributor to its decongestant properties. Generally speaking, darker honey contains more antioxidants. Buckwheat and avocado honey are particularly good choices. If these honeys are not available in your area, choose the darkest honey you can find.    Take all medications as directed. If you have been prescribed antibiotics, make sure to complete them.     If your condition fails to improve in a timely manner, you should receive another evaluation by your Primary Care Provider to discuss your  concerns or return to urgent care for a recheck.      **You must understand that you have received Urgent Care treatment only and that you may be released before all of your medical problems are known or treated. You, the patient, are responsible to arrange for follow-up care as instructed. If your condition worsens at any time, you should report immediately to your nearest Emergency Department for further evaluation.

## 2022-03-02 NOTE — PROGRESS NOTES
"Subjective:       Patient ID: Elmer Borrero is a 66 y.o. male.    Vitals:  height is 5' 7" (1.702 m) and weight is 126.1 kg (278 lb). His tympanic temperature is 98.1 °F (36.7 °C). His blood pressure is 135/73 and his pulse is 82. His respiration is 18 and oxygen saturation is 97%.     Chief Complaint: Cough    66 year old male c/o non-produtive cough with wheezing for one week. Pt reports he is coughing so much that he pulled a muscle in his back and currently has a salon pas patch on . He reports he is vaccinated with no close exposure to covid. He refused covid and flu testing today, reports negative covid test at home. Pt reports he did not take his blood pressure medication today.     Cough  This is a new problem. The current episode started in the past 7 days. The problem has been gradually worsening. The cough is non-productive. Associated symptoms include a sore throat, shortness of breath and wheezing. Pertinent negatives include no hemoptysis. Nothing aggravates the symptoms. He has tried OTC cough suppressant (mucinex, delsyn) for the symptoms. The treatment provided no relief. His past medical history is significant for environmental allergies.       Constitution: Negative.   HENT: Positive for sore throat.    Neck: neck negative.   Cardiovascular: Negative.    Respiratory: Positive for cough, shortness of breath and wheezing. Negative for sputum production, bloody sputum, COPD and stridor.    Musculoskeletal: Positive for pain and back pain.   Allergic/Immunologic: Positive for environmental allergies, seasonal allergies, sneezing and immunizations up-to-date.       Objective:      Physical Exam   Constitutional: He is oriented to person, place, and time. He appears well-developed. He is cooperative.  Non-toxic appearance. He does not appear ill. No distress.   HENT:   Head: Normocephalic and atraumatic.   Ears:   Right Ear: Hearing, tympanic membrane, external ear and ear canal normal.   Left Ear: " Hearing, tympanic membrane, external ear and ear canal normal.   Nose: Congestion present. No mucosal edema, rhinorrhea or nasal deformity. No epistaxis. Right sinus exhibits no maxillary sinus tenderness and no frontal sinus tenderness. Left sinus exhibits no maxillary sinus tenderness and no frontal sinus tenderness.   Mouth/Throat: Uvula is midline, oropharynx is clear and moist and mucous membranes are normal. No trismus in the jaw. Normal dentition. No uvula swelling. No oropharyngeal exudate, posterior oropharyngeal edema or posterior oropharyngeal erythema.   Eyes: Conjunctivae and lids are normal. No scleral icterus.   Neck: Trachea normal and phonation normal. Neck supple. No edema present. No erythema present. No neck rigidity present.   Cardiovascular: Normal rate, regular rhythm, normal heart sounds and normal pulses.   Pulmonary/Chest: Effort normal. No stridor. No respiratory distress. He has no decreased breath sounds. He has wheezes (diffuse inspiratory and expiratory). He has no rhonchi. He has no rales. He exhibits no tenderness.   Abdominal: Normal appearance.   Musculoskeletal: Normal range of motion.         General: No deformity. Normal range of motion.      Thoracic back: He exhibits normal range of motion.      Lumbar back: He exhibits normal range of motion.        Back:    Neurological: He is alert and oriented to person, place, and time. He exhibits normal muscle tone. Coordination normal.   Skin: Skin is warm, dry, intact, not diaphoretic and not pale.   Psychiatric: His speech is normal and behavior is normal. Judgment and thought content normal.   Nursing note and vitals reviewed.        Assessment:       1. Acute URI    2. Cough    3. Wheezing          Plan:         Acute URI  -     azithromycin (ZITHROMAX) 250 MG tablet; Take 2 tablets (500 mg) on  Day 1,  followed by 1 tablet (250 mg) once daily on Days 2 through 5.  Dispense: 6 tablet; Refill: 0  -     predniSONE (DELTASONE) 10 MG  tablet; Take 1 tablet (10 mg total) by mouth once daily. for 5 days  Dispense: 5 tablet; Refill: 0  -     albuterol (PROVENTIL/VENTOLIN HFA) 90 mcg/actuation inhaler; Inhale 2 puffs into the lungs every 6 (six) hours as needed for Wheezing or Shortness of Breath. Rescue  Dispense: 6.7 g; Refill: 0    Cough  -     X-Ray Chest PA And Lateral; Future; Expected date: 03/02/2022  -     promethazine-dextromethorphan (PROMETHAZINE-DM) 6.25-15 mg/5 mL Syrp; Take 5 mLs by mouth nightly as needed (cough).  Dispense: 120 mL; Refill: 0    Wheezing  -     X-Ray Chest PA And Lateral; Future; Expected date: 03/02/2022  -     albuterol nebulizer solution 2.5 mg    X-Ray Chest PA And Lateral    Result Date: 3/2/2022  EXAMINATION: XR CHEST PA AND LATERAL CLINICAL HISTORY: Cough, unspecified FINDINGS: Two views chest: Heart size is normal.  Lungs are clear and the bones show nothing unusual.     No acute process seen. Electronically signed by: Cristobal Carmona MD Date:    03/02/2022 Time:    12:11    Pt advised to f/u with his pcp.   Should any symptoms worsen, advised to go to the nearest ED.          Patient Instructions   The following are suggestions to help you with upper respiratory symptoms.    Congestion:    Nasal Saline  Nasal saline is available over the counter. There are several different commercial preparations such as Ocean spray and Ayr spray. There is no limit on the use of Nasal saline. Saline is used by snorting the mist up into the nose then later gently blowing the nose to get rid of any secretions that it has loosened.     Mucous Thinners and Decongestants  Mucous thinners and decongestants are used to shrink down the tissues and promote sinus drainage. There are multiple prescription and over-the-counter medications available. A mucous thinner will tend to be drying unless you are also drinking plenty of water when taking these. If you have high blood pressure, it is very important to monitor your pressure while  on decongestants. The mucous thinner/decongestant combinations are typically given twice per day. However, some people will be unable to tolerate these at night and should only take them once per day.    Decongestant Nasal Sprays  Over-the-counter decongestant nasal spray such as Afrin, may be helpful as an initial step in treating upper respiratory infections. This spray can be used for up to approximately 3 days and is used no more than twice per day. Topical nasal decongestant spray for longer than 5 days will result in a physical addiction, in which the nasal lining will become significantly swollen and irritated until the spray is used again. May cause elevated blood pressure    Use pseudoephedrine (behind the counter)  for sinus pressure and congestion- Pseudoephedrine 30 mg up to 240 mg /day. Common brands include Sudafed, Zephrex-D Wal-phed.  Warning: It can raise your blood pressure and give you palpitations, avoid with history of high blood pressure, palpitations, and severe cardiac disease.  Coricidin HBP is okay to use if you have high blood pressure.     Nasal Steroids  Nasal steroid medications such as Flonase are useful for upper respiratory infections, allergies, and sensitivities to airborne irritants. Unfortunately, they do not begin to work for 1-2 days, and they do not reach their maximum benefit for approximately 2-3 weeks. Initial therapy is typically 2 puffs per nostril twice per day. This should be used for only a few days, then the maintenance dosage is one or two puffs per nostril once per day. This can be done at any time of the day. The most effective way to use any nasal medication is to look down at your toes when spraying it in. Aim slightly away from the septum (dividing plate between the nostrils), and gently inhale. This ensures that the spray will go into the sinus cavities and not straight up into the nose. A good way to avoid spraying onto the septum is to use the right hand to  spray into the left nostril, and vice versa for the right nostril. Occasionally, nasal steroids can increase the risk of nosebleeds, but in general they are very well tolerated. If you develop a bloody nose, stop using the medication immediately.    Clear Runny Nose/Allergic Rhinitis:  Use an antihistamine to help dry you out.   Antihistamines  Antihistamines are available both over-the-counter and as a prescription. There are also various decongestant and antihistamine combinations available such as Claritin, Allegra, and Zyrtec. It is best to take any antihistamine-decongestant combination in the morning to avoid insomnia. Zyrtec should probably be taken at night, in order to reduce the chance of sleepiness during the daytime. If there is a significant infection present and secretions are already thickened, it is recommended to discontinue antihistamines and use a mucous thinner/decongestant combination.      Oral Steroids  Oral steroids can be used with more sever infections. Often, they are the only medications that will reduce the symptoms of pressure and allow the nasal sinuses to drain. These are best taken on a full stomach and earlier in the day is better. They may give you some irritability, stomach upset, or hyperactivity. This can also interfere with sleep.     A person who has high blood pressure or diabetes should be very careful to monitor their blood pressure or blood glucose while taking steroids. Steroids can have multiple side effects especially when taken long-term. Short-term doses are usually very well tolerated and extremely effective in controlling the symptoms associated with acute and chronic sinus infections and severe allergies. The use of steroids for greater than approximately seven days requires a tapering down in order to discontinue them. You should not abruptly stop your steroid if you have been taking the same dose for greater than one week.    Antibiotic Treatment  Finally, when  all of these other measures have failed, and a bacterial infection is present, an antibiotic will be prescribed. The most common symptoms of acute sinusitis of a bacterial nature are pain, pressure, and thick and colored nasal drainage. However, not all colored drainage means that there is a bacterial infection present. According to the Center for Disease Control, only 2% of colds will progress to result in bacterial sinusitis. Most upper respiratory infections should NOT be treated with antibiotics. Antibiotics should be reserved for upper respiratory infections which last longer than 10 days, or which worsen after 5 to 7 days of treatment. The use of antibiotics for nonbacterial upper respiratory infections has resulted in a severe problem with the emergence of bacteria which are resistant to multiple forms of antibiotics, and some bacteria are currently only treatable with intravenous antibiotics.    Body Aches/Pains/Fever  For patients who are not allergic to and are not on anticoagulants, you can alternate Tylenol every 4 hours and Motrin every 6 hours for fever above 100.4F and/or pain.  For patients who are allergic or intolerant to NSAIDS, have gastritis, gastric ulcers, or history of GI bleeds, are pregnant, or are on anticoagulant therapy, you can take Tylenol every 4 hours as needed for fever above 100.4F and/or pain.     Maintain adequate hydration -  Rest and keep yourself/patient well hydrated. For adults, it is recommended to drink at least 8 glasses of water daily.  This may help thin secretions and soothe the respiratory mucosa.     Sore Throat  Perform warm, salt water gargles (1/2 tsp salt to 1 cup warm water) to help reduce inflammation and throat discomfort. Chloraseptic sprays and throat lozenges will also help with your throat pain.    COUGH  A viral cough may linger for 3 to 4 weeks but should steadily improve over time. Coughing is the body's natural way to clear mucus and help get rid of  bacteria and viruses. Therefore, cough suppressants are usually not recommended.      Use mucinex (guaifenisin) up to 2400mg/day to help clear and break up/loosen mucus/congestion from the chest when you have a cold or flu.      Common cough suppressants include the ingredient dextromethorphan or DM, (such as Mucinex DM) available over-the-counter and can be used for cough to stop the tickle in the back of your throat.      ? Honey may be beneficial, especially on nocturnal cough 1 to 2 teaspoons can be taken straight or diluted in tea, juice or other liquid.    The antioxidants in honey are an important contributor to its decongestant properties. Generally speaking, darker honey contains more antioxidants. Buckwheat and avocado honey are particularly good choices. If these honeys are not available in your area, choose the darkest honey you can find.    Take all medications as directed. If you have been prescribed antibiotics, make sure to complete them.     If your condition fails to improve in a timely manner, you should receive another evaluation by your Primary Care Provider to discuss your concerns or return to urgent care for a recheck.      **You must understand that you have received Urgent Care treatment only and that you may be released before all of your medical problems are known or treated. You, the patient, are responsible to arrange for follow-up care as instructed. If your condition worsens at any time, you should report immediately to your nearest Emergency Department for further evaluation.                        Additional MDM:     Heart Failure Score:   COPD = No

## 2022-03-09 PROBLEM — R73.02 IMPAIRED GLUCOSE TOLERANCE: Status: ACTIVE | Noted: 2022-03-09

## 2022-03-17 ENCOUNTER — DOCUMENTATION ONLY (OUTPATIENT)
Dept: NEUROLOGY | Facility: CLINIC | Age: 67
End: 2022-03-17
Payer: MEDICARE

## 2022-05-09 ENCOUNTER — DOCUMENTATION ONLY (OUTPATIENT)
Dept: NEUROLOGY | Facility: CLINIC | Age: 67
End: 2022-05-09
Payer: MEDICARE

## 2022-12-02 ENCOUNTER — HOSPITAL ENCOUNTER (OUTPATIENT)
Dept: RADIOLOGY | Facility: HOSPITAL | Age: 67
Discharge: HOME | End: 2022-12-02
Attending: NURSE PRACTITIONER
Payer: MEDICARE

## 2022-12-02 DIAGNOSIS — E66.01 MORBID OBESITY WITH BMI OF 40.0-44.9, ADULT: ICD-10-CM

## 2022-12-02 DIAGNOSIS — R74.8 ELEVATED LIVER ENZYMES: ICD-10-CM

## 2022-12-02 DIAGNOSIS — K76.0 FATTY LIVER: ICD-10-CM

## 2022-12-02 PROCEDURE — 76391 MR ELASTOGRAPHY: ICD-10-PCS | Mod: 26,,, | Performed by: INTERNAL MEDICINE

## 2022-12-02 PROCEDURE — 76391 MR ELASTOGRAPHY: CPT | Mod: TC

## 2022-12-02 PROCEDURE — 76391 MR ELASTOGRAPHY: CPT | Mod: 26,,, | Performed by: INTERNAL MEDICINE

## 2022-12-07 ENCOUNTER — OFFICE VISIT (OUTPATIENT)
Dept: HEPATOLOGY | Facility: CLINIC | Age: 67
End: 2022-12-07
Payer: MEDICARE

## 2022-12-07 DIAGNOSIS — I10 ESSENTIAL HYPERTENSION: ICD-10-CM

## 2022-12-07 DIAGNOSIS — E78.5 DYSLIPIDEMIA: ICD-10-CM

## 2022-12-07 DIAGNOSIS — K76.0 FATTY LIVER: Primary | ICD-10-CM

## 2022-12-07 DIAGNOSIS — E78.2 MIXED HYPERLIPIDEMIA: ICD-10-CM

## 2022-12-07 DIAGNOSIS — E66.01 MORBID OBESITY WITH BMI OF 40.0-44.9, ADULT: ICD-10-CM

## 2022-12-07 PROBLEM — Z95.5 S/P CORONARY ARTERY STENT PLACEMENT: Status: ACTIVE | Noted: 2020-07-21

## 2022-12-07 PROCEDURE — 99214 PR OFFICE/OUTPT VISIT, EST, LEVL IV, 30-39 MIN: ICD-10-PCS | Mod: 95,,, | Performed by: NURSE PRACTITIONER

## 2022-12-07 PROCEDURE — 99214 OFFICE O/P EST MOD 30 MIN: CPT | Mod: 95,,, | Performed by: NURSE PRACTITIONER

## 2022-12-07 NOTE — PROGRESS NOTES
The patient location is: [place]  The chief complaint leading to consultation is: fatty liver     Visit type: audiovisual    Face to Face time with patient: 30 minutes of total time spent on the encounter, which includes face to face time and non-face to face time preparing to see the patient (eg, review of tests), Obtaining and/or reviewing separately obtained history, Documenting clinical information in the electronic or other health record, Independently interpreting results (not separately reported) and communicating results to the patient/family/caregiver, or Care coordination (not separately reported).     Each patient to whom he or she provides medical services by telemedicine is:  (1) informed of the relationship between the physician and patient and the respective role of any other health care provider with respect to management of the patient; and (2) notified that he or she may decline to receive medical services by telemedicine and may withdraw from such care at any time.    Notes:     [facility] Established Patient Visit    Reason for Visit:  Fatty liver, elevated liver enzymes    PCP: Dae Romo    HPI:  This is a 67 y.o. male with PMH noted below, here for follow up of above     Previous heavy alcohol use (2-3 beers and 2-3 glasses of wine daily x 40 years). Stopped alcohol end of August 2019 before scheduled knee surgery, but resumed heavy/daily alcohol use in 2020     Serological workup was negative for Daniel's, alpha-1 antitrypsin deficiency, hemochromatosis, autoimmune etiology, and viral hepatitis C.      Risk factors for fatty liver include previous alcohol use, morbid obesity, HTN, HLD     Liver fibrosis staging:  -- Fibroscan 10/2019 F0-1 (kPA 7.1), S3 steatosis  -- fibroscan 5/2021 noted F4 (kPA 19.7), S3 ()  -- MRI elasto 10/2021 noted no fibrosis. No steatosis staging because scan was ended early due to back pain         Interval HPI: Presents today with wife via  video visit. Decreased alcohol use (now drinking 2 servings daily) but drinking most days still   No weight loss   MRI elasto without any fibrosis   Previous trying to follow small portions. Not currently following any dietary restrictions otherwise, does report heavier sweets/carb intake  Liver enzymes and PETH trending down     Labs done 12/2022 show near normal transaminase levels (ALT > AST, intermittently elevated since 2016 )  Platelets and alk phos WNL  Synthetic liver functioning WNL    Lab Results   Component Value Date    ALT 38 12/02/2022    AST 25 12/02/2022    ALKPHOS 51 (L) 12/02/2022    BILITOT 0.7 12/02/2022    ALBUMIN 4.3 12/02/2022    INR 0.9 10/19/2021     10/19/2021     MRI elasto done 12/2022 noted fatty liver     Denies family history of liver disease. + daily Alcohol consumption  Social History     Substance and Sexual Activity   Alcohol Use Yes    Comment: daily beer or wine     Immunity to Hep A and B - completed Hep B vaccine series         PMHX:  has a past medical history of Actinic keratosis, Arthritis, BPH (benign prostatic hyperplasia), Cataract, Coronary artery disease, Diabetic retinopathy of both eyes, Fatty liver, Heart attack, Hyperlipidemia, Hypertension, Kidney stone, Morbid obesity with BMI of 40.0-44.9, adult (10/2/2019), Neuropathy, Obesity (BMI 30-39.9) (10/2/2019), GEOVANNA on CPAP, Renal calculi, and Subclinical hypothyroidism.    PSHX:  has a past surgical history that includes Knee surgery; Joint replacement; Percutaneous cryotherapy of peripheral nerve using liquid nitrous oxide in closed needle device (Right, 8/26/2019); Spine surgery; Total knee arthroplasty (Right, 9/5/2019); right knee replacement ; Left heart catheterization (Left, 11/27/2019); and Posterior fusion of cervical spine with laminectomy (N/A, 7/28/2020).    The patient's social and family histories were reviewed by me and updated in the appropriate section of the electronic medical record.    Review  of patient's allergies indicates:   Allergen Reactions    Ace inhibitors Other (See Comments)     cough       Current Outpatient Medications on File Prior to Visit   Medication Sig Dispense Refill    albuterol (PROVENTIL/VENTOLIN HFA) 90 mcg/actuation inhaler Inhale 2 puffs into the lungs every 6 (six) hours as needed for Wheezing or Shortness of Breath. Rescue 6.7 g 0    allopurinoL (ZYLOPRIM) 300 MG tablet TAKE 1 TABLET ONCE DAILY 90 tablet 3    alpha lipoic acid 600 mg Cap Take 1 capsule by mouth once daily. 90 each 3    aspirin (ECOTRIN) 81 MG EC tablet TAKE 1 TABLET BY MOUTH EVERY DAY 32 tablet 0    blood sugar diagnostic Strp 1 each by Misc.(Non-Drug; Combo Route) route once daily. One touch verio flex 100 each 3    carvediloL (COREG) 6.25 MG tablet Take 1 tablet (6.25 mg total) by mouth 2 (two) times daily. 60 tablet 0    cyclobenzaprine (FLEXERIL) 10 MG tablet TAKE 1 TABLET BY MOUTH THREE TIMES A DAY AS NEEDED FOR MUSCLE SPASMS 90 tablet 1    finasteride (PROSCAR) 5 mg tablet TAKE 1 TABLET BY MOUTH EVERY DAY 90 tablet 1    fluticasone propionate (FLONASE) 50 mcg/actuation nasal spray SPRAY 1 SPRAY INTO EACH NOSTRIL EVERY DAY 48 mL 1    lancets Misc 1 each by Misc.(Non-Drug; Combo Route) route once daily. One touch delica 100 each 3    loratadine (CLARITIN) 10 mg tablet Take 1 tablet by mouth once daily. Take if needed      multivitamin capsule Take 1 capsule by mouth once daily.       omega-3 fatty acids/fish oil (FISH OIL-OMEGA-3 FATTY ACIDS) 300-1,000 mg capsule Take 1 capsule by mouth once daily.       rosuvastatin (CRESTOR) 40 MG Tab Take 1 tablet by mouth Daily.      tamsulosin (FLOMAX) 0.4 mg Cap TAKE 1 CAPSULE BY MOUTH EVERY DAY 90 capsule 3    telmisartan-hydrochlorothiazide (MICARDIS HCT) 80-25 mg per tablet TAKE 1 TABLET BY MOUTH EVERY DAY 90 tablet 3    traMADoL (ULTRAM) 50 mg tablet Take 1 tablet (50 mg total) by mouth every 8 (eight) hours as needed for Pain. 21 tablet 0    zafirlukast  (ACCOLATE) 20 MG tablet TAKE 1 TABLET BY MOUTH TWICE A DAY 60 tablet 11     Current Facility-Administered Medications on File Prior to Visit   Medication Dose Route Frequency Provider Last Rate Last Admin    sodium chloride 0.9% flush 10 mL  10 mL Intravenous PRN Jade Mora MD           SOCIAL HISTORY:   Social History     Substance and Sexual Activity   Alcohol Use Yes    Comment: daily beer or wine       Social History     Substance and Sexual Activity   Drug Use Never       ROS:   GENERAL: Denies fatigue  CARDIOVASCULAR: Denies edema  GI: Denies abdominal pain  SKIN: Denies rash, itching   NEURO: Denies confusion, memory loss, or mood changes    Objective Findings:    PHYSICAL EXAM:   Friendly White male, in no acute distress; alert and oriented to person, place and time  VITALS: There were no vitals taken for this visit.  EYES: Sclerae anicteric  GI: Soft, non-tender, non-distended. No ascites.  EXTREMITIES:  No edema.  SKIN: Warm and dry. No jaundice. No telangectasias noted. No palmar erythema.  NEURO:  No asterixis.  PSYCH:  Thought and speech pattern appropriate. Behavior normal        EDUCATION:  See instructions discussed with patient in Instructions section of the After Visit Summary       ASSESSMENT & PLAN:  67 y.o. White male with:  1.   Fatty liver, likely previous alcohol + metabolic risk factors  -- Risk factors for fatty liver include  alcohol use, morbid obesity, HTN, HLD  -- MRI elasto done 12/2022 noted fatty liver   - ALT > AST, intermittently elevated since 2016  - Synthetic liver function WNL  -- MRI elasto 12/2022 noted no fibrosis, severe steatosis - will repeat yearly      2. Previously elevated liver enzymes  -- Serological workup was negative for Daniel's, alpha-1 antitrypsin deficiency, hemochromatosis, autoimmune etiology, and viral hepatitis C.      3. Morbid Obesity, HTN, HLD  -- There is no height or weight on file to calculate BMI.   -- increases risk for fatty liver  --  recommendations to pt:  1. Weight loss goal of 25-40 lbs  2. Low carb/sugar, high fiber and protein diet  3. Exercise, 5 days per week, 30 minutes per day, as tolerated  4. Recommend good cholesterol, blood pressure, blood sugar levels     4. Alcohol use  -- recommend to start spacing out alcohol use so that he is not drinking daily   -- resumed daily alcohol use, Limit alcohol consumption, no more than 1-2 servings of alcohol in any day (1 serving is 5 ounces of wine, 12 ounces of beer, or 1.5 ounces of liquor) and do NOT recommend any daily alcohol use     Social History     Substance and Sexual Activity   Alcohol Use Yes    Comment: daily beer or wine         Follow up in about 1 year (around 12/7/2023). with MRI elasto and labs 1 week before  Orders Placed This Encounter   Procedures    MR Elastography    Hepatic Function Panel        Thank you for allowing me to participate in the care of DC GrossC    I spent a total of 30 minutes on the day of the visit.This includes face to face time and non-face to face time preparing to see the patient (eg, review of tests), obtaining and/or reviewing separately obtained history, documenting clinical information in the electronic or other health record, independently interpreting results and communicating results to the patient/family/caregiver, and coordinating care.       CC'ed note to:   Dae Romo MD

## 2022-12-07 NOTE — PATIENT INSTRUCTIONS
1. MRI to look for fat or scar tissue in the liver showed >67% fat in the liver, no scar tissue damage   2. Follow up in 1 year with MRI and labs a few days before    There is no FDA approved therapy for fatty liver disease. Therefore, these things are important:  Limit alcohol consumption, no more than 1-2 serving(s) of alcohol in any day (1 serving is 5 ounces of wine, 12 ounces of beer, or 1.5 ounces of liquor) and do NOT recommend any daily alcohol use    2 Weight loss goal of 25-40 lbs, referral for [facility] if interested. Also, if interested in a dietician visit to create a weight loss plan, contact the dietician team at [facility] at [email or web address] to schedule a visit to you can call [facility] in [place]: [phone] and the  will transfer the call to one of the dieticians to schedule an appointment. Or you can also call [phone] to schedule. They do offer video visits   3. Low carb/sugar, high fiber and protein diet.Try to limit your carb intake to LESS than 30-45 grams of carbs with a meal or LESS than 5-10 grams with any snack (total of any snack foods eaten during that time). Use [employer] Pal or Lose It usha to add up your carbs through the day. Do NOT drink any beverages with calories or carbs (this can lead to high blood sugar and weight gain). Also, some of our patients have been very successful with weight loss using the pre made/planned meal planning services that limit calories and portion size ( The main thing to look for is low calorie, high protein, low carb)  4. Exercise, 5 days per week, 30 minutes per day, as tolerated  5. Recommend good cholesterol, blood pressure, blood sugar levels       In some people, fatty liver can progress to steatohepatitis (inflamatory fatty liver) and possibly to cirrhosis, increasing the risk for liver cancer, liver failure, and death. Therefore, the lifestyle changes are very important to decrease this  risk.     Website with information about fatty liver and inflammation related to fatty liver (JON) = [email or web address].com  AND [email or web address]     Yes

## 2022-12-07 NOTE — Clinical Note
Please c ontact pt to schedule video visit Follow up in 1 year with MRI elasto and labs a few days before Thanks !

## 2022-12-08 ENCOUNTER — TELEPHONE (OUTPATIENT)
Dept: HEPATOLOGY | Facility: CLINIC | Age: 67
End: 2022-12-08
Payer: MEDICARE

## 2022-12-08 NOTE — TELEPHONE ENCOUNTER
----- Message from Diane Cantor NP sent at 12/7/2022  3:38 PM CST -----  Please c ontact pt to schedule video visit Follow up in 1 year with MRI elasto and labs a few days before  Thanks !

## 2023-07-27 RX ORDER — PERPHENAZINE 16 MG
1 TABLET ORAL DAILY
Qty: 90 EACH | Refills: 3 | Status: SHIPPED | OUTPATIENT
Start: 2023-07-27

## 2023-12-01 ENCOUNTER — HOSPITAL ENCOUNTER (OUTPATIENT)
Dept: RADIOLOGY | Facility: HOSPITAL | Age: 68
Discharge: HOME | End: 2023-12-01
Attending: NURSE PRACTITIONER
Payer: MEDICARE

## 2023-12-01 DIAGNOSIS — E66.01 MORBID OBESITY WITH BMI OF 40.0-44.9, ADULT: ICD-10-CM

## 2023-12-01 DIAGNOSIS — K76.0 FATTY LIVER: ICD-10-CM

## 2023-12-01 PROCEDURE — 76391 MR ELASTOGRAPHY: ICD-10-PCS | Mod: 26,,, | Performed by: RADIOLOGY

## 2023-12-01 PROCEDURE — 76391 MR ELASTOGRAPHY: CPT | Mod: 26,,, | Performed by: RADIOLOGY

## 2023-12-01 PROCEDURE — 76391 MR ELASTOGRAPHY: CPT | Mod: TC

## 2024-01-03 ENCOUNTER — OFFICE VISIT (OUTPATIENT)
Dept: HEPATOLOGY | Facility: CLINIC | Age: 69
End: 2024-01-03
Payer: MEDICARE

## 2024-01-03 VITALS — BODY MASS INDEX: 43.79 KG/M2 | HEIGHT: 67 IN | WEIGHT: 279 LBS

## 2024-01-03 DIAGNOSIS — I10 ESSENTIAL HYPERTENSION: ICD-10-CM

## 2024-01-03 DIAGNOSIS — E66.01 MORBID OBESITY WITH BMI OF 40.0-44.9, ADULT: ICD-10-CM

## 2024-01-03 DIAGNOSIS — E78.2 MIXED HYPERLIPIDEMIA: ICD-10-CM

## 2024-01-03 DIAGNOSIS — K76.0 FATTY LIVER: Primary | ICD-10-CM

## 2024-01-03 DIAGNOSIS — E78.5 DYSLIPIDEMIA: ICD-10-CM

## 2024-01-03 PROCEDURE — 99214 OFFICE O/P EST MOD 30 MIN: CPT | Mod: 95,,, | Performed by: NURSE PRACTITIONER

## 2024-01-03 NOTE — Clinical Note
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 His fatty liver continues to worsen due to weight and alcohol. He would benefit from a GLP-1 medication from a weight perspective as he likely needs to lose 50+ lbs but unfortunately we aren't prescribing it in our clinic. He is very interested so figured I would reach out to you to see if you think he may be a good candidate? He has never had pancreatitis or family h/o medullary thyroid cancer. If you think he would be a good candidate, he wanted to use the [facility]. Thanks in advance!

## 2024-01-03 NOTE — PATIENT INSTRUCTIONS
1. MRI to look for fat or scar tissue in the liver showed severe fatty liver, no scar tissue damage  2. Follow up in 1 year with lab and MRI a few days before    There is no FDA approved therapy for fatty liver disease. Therefore, these things are important:  Limit alcohol consumption, decrease by 50%  2 Weight loss goal of 50 lbs. [facility] offers benefits to patients so let me know if you want a referral to the [facility] gym. Also, [facility] has dieticians that can create a weight loss plan. If you are interested let me know and I can place a referral. If so, contact the dietician team at [facility] at email [email or web address] or call [phone] to get scheduled. They do offer video visits   3. Low carb/sugar and high protein diet (~1 g/kg/day of protein).Try to limit your carb intake to LESS than 30-45 grams of carbs with a meal or LESS than 5-10 grams with any snack (total of any snack foods eaten during that time). Use MyFitness Pal or Lose It usha to add up your carbs through the day. Do NOT drink any beverages with calories or carbs (this can lead to high blood sugar and weight gain). The main thing to focus on is low calorie, high protein, low carb)  4. Exercise, 5 days per week, 30 minutes per day, as tolerated  5. Recommend good cholesterol, blood pressure, blood sugar levels     In some people, fatty liver can progress to steatohepatitis (inflamatory fatty liver) and possibly to cirrhosis, increasing the risk for liver cancer, liver failure, and death. Therefore, the lifestyle changes are very important to decrease this risk.

## 2024-12-12 ENCOUNTER — PATIENT MESSAGE (OUTPATIENT)
Dept: HEPATOLOGY | Facility: CLINIC | Age: 69
End: 2024-12-12
Payer: MEDICARE

## 2024-12-12 DIAGNOSIS — F40.240 CLAUSTROPHOBIA: Primary | ICD-10-CM

## 2024-12-12 RX ORDER — DIAZEPAM 5 MG/1
5 TABLET ORAL ONCE
Qty: 2 TABLET | Refills: 0 | Status: SHIPPED | OUTPATIENT
Start: 2024-12-12 | End: 2024-12-12

## 2024-12-20 ENCOUNTER — HOSPITAL ENCOUNTER (OUTPATIENT)
Dept: RADIOLOGY | Facility: HOSPITAL | Age: 69
Discharge: HOME | End: 2024-12-20
Attending: NURSE PRACTITIONER
Payer: MEDICARE

## 2024-12-20 DIAGNOSIS — K76.0 FATTY LIVER: ICD-10-CM

## 2024-12-20 PROCEDURE — 76391 MR ELASTOGRAPHY: CPT | Mod: TC

## 2024-12-20 PROCEDURE — 76391 MR ELASTOGRAPHY: CPT | Mod: 26,,, | Performed by: RADIOLOGY

## 2025-01-03 ENCOUNTER — OFFICE VISIT (OUTPATIENT)
Dept: HEPATOLOGY | Facility: CLINIC | Age: 70
End: 2025-01-03
Payer: MEDICARE

## 2025-01-03 VITALS — WEIGHT: 285 LBS | BODY MASS INDEX: 44.73 KG/M2 | HEIGHT: 67 IN

## 2025-01-03 DIAGNOSIS — E66.01 CLASS 3 SEVERE OBESITY DUE TO EXCESS CALORIES WITH SERIOUS COMORBIDITY AND BODY MASS INDEX (BMI) OF 45.0 TO 49.9 IN ADULT: ICD-10-CM

## 2025-01-03 DIAGNOSIS — I10 ESSENTIAL HYPERTENSION: ICD-10-CM

## 2025-01-03 DIAGNOSIS — E78.5 DYSLIPIDEMIA: ICD-10-CM

## 2025-01-03 DIAGNOSIS — E66.813 CLASS 3 SEVERE OBESITY DUE TO EXCESS CALORIES WITH SERIOUS COMORBIDITY AND BODY MASS INDEX (BMI) OF 45.0 TO 49.9 IN ADULT: ICD-10-CM

## 2025-01-03 DIAGNOSIS — K76.0 FATTY LIVER: Primary | ICD-10-CM

## 2025-01-03 PROCEDURE — 98006 SYNCH AUDIO-VIDEO EST MOD 30: CPT | Mod: 95,,, | Performed by: NURSE PRACTITIONER

## 2025-01-03 NOTE — PROGRESS NOTES
The patient location is: [place]  The chief complaint leading to consultation is: see below    Visit type: audiovisual    Face to Face time with patient: 30 minutes of total time spent on the encounter, which includes face to face time and non-face to face time preparing to see the patient (eg, review of tests), Obtaining and/or reviewing separately obtained history, Documenting clinical information in the electronic or other health record, Independently interpreting results (not separately reported) and communicating results to the patient/family/caregiver, or Care coordination (not separately reported).     Each patient to whom he or she provides medical services by telemedicine is:  (1) informed of the relationship between the physician and patient and the respective role of any other health care provider with respect to management of the patient; and (2) notified that he or she may decline to receive medical services by telemedicine and may withdraw from such care at any time.    Notes:     [facility] Established Patient Visit    Reason for Visit:  Fatty liver    PCP: Prachi Lewis    HPI:  This is a 69 y.o. male with PMH noted below, here for follow up of above     Previous heavy alcohol use (2-3 beers and 2-3 glasses of wine daily x 40 years). Stopped alcohol end of August 2019 before scheduled knee surgery, but resumed regular alcohol use in 2020 with some periods of sobriety     Serological workup was negative for Daniel's, alpha-1 antitrypsin deficiency, hemochromatosis, autoimmune etiology, and viral hepatitis C.      Risk factors for fatty liver include alcohol use, obesity, HTN, HLD     Liver fibrosis staging:  -- Fibroscan 10/2019 F0-1 (kPA 7.1), S3 steatosis  -- fibroscan 5/2021 noted F4 (kPA 19.7), S3 ()  -- MRI elasto 10/2021 noted no fibrosis. No steatosis staging because scan was ended early due to back pain   -- MRI elasto 12/2023 noted severe steatosis, no fibrosis, no iron   (29.3%), kpa 2.1  -- MRI elasto 12/2024 noted moderate/severe steatosis (24.9%), no fibrosis (kPA 2.01)        Interval HPI: Presents today with wife via video visit. Previously Drinking beer or wine most days of the week (usually 2 servings, sometimes more), did have period of sobriety for approx 10 months after last visit which is great accomplishment  PETH recently only mildly elevated   Current wt 285 lbs, similar to previous   Intermittent Fried/fast/takeout foods  No SSB  MRI elasto without any fibrosis but severe/worsening steatosis  On Ozempic 1 mg, no significant weight loss     Labs done 12/2024 show normal transaminase levels (ALT > AST, intermittently elevated since 2016 )  Platelets and alk phos WNL  Synthetic liver functioning WNL    Lab Results   Component Value Date    ALT 29 12/20/2024    AST 25 12/20/2024    ALKPHOS 55 12/20/2024    BILITOT 0.7 12/20/2024    ALBUMIN 4.0 12/20/2024    INR 0.9 10/19/2021     10/14/2024     MRI elasto done 12/2024 noted fatty liver     Denies family history of liver disease. + intermittent Alcohol consumption  Social History     Substance and Sexual Activity   Alcohol Use Yes    Alcohol/week: 14.0 standard drinks of alcohol    Types: 14 Cans of beer per week    Comment: previously ~14 servings per week, did have period of sobriety for 10 months in 2024     Immunity to Hep A and B - completed Hep B vaccine series         PMHX:  has a past medical history of Actinic keratosis, Arthritis, BPH (benign prostatic hyperplasia), Cataract, Coronary artery disease, COVID-19 vaccine administered, Diabetic retinopathy of both eyes, Fatty liver, Heart attack, Hyperlipidemia, Hypertension, Inguinal hernia, Kidney stone, Morbid obesity with BMI of 40.0-44.9, adult (10/02/2019), Neuropathy, Obesity (BMI 30-39.9) (10/02/2019), GEOVANNA on CPAP, Personal history of colonic polyps, Renal calculi, Subclinical hypothyroidism, and Umbilical hernia.    PSHX:  has a past surgical history  that includes Knee surgery; Joint replacement (Left, 2003); Percutaneous cryotherapy of peripheral nerve using liquid nitrous oxide in closed needle device (Right, 08/26/2019); Spine surgery; Total knee arthroplasty (Right, 09/05/2019); right knee replacement ; Left heart catheterization (Left, 11/27/2019); Posterior fusion of cervical spine with laminectomy (N/A, 07/28/2020); Back surgery; and Colonoscopy (N/A, 8/9/2023).    The patient's social and family histories were reviewed by me and updated in the appropriate section of the electronic medical record.    Review of patient's allergies indicates:   Allergen Reactions    Ace inhibitors Other (See Comments)     cough       Current Outpatient Medications on File Prior to Visit   Medication Sig Dispense Refill    albuterol (PROVENTIL) 2.5 mg /3 mL (0.083 %) nebulizer solution Take 3 mLs (2.5 mg total) by nebulization every 6 (six) hours as needed for Wheezing or Shortness of Breath. Rescue 300 mL 11    albuterol (PROVENTIL/VENTOLIN HFA) 90 mcg/actuation inhaler Inhale 2 puffs into the lungs every 6 (six) hours as needed for Wheezing or Shortness of Breath. Rescue 18 g 0    allopurinoL (ZYLOPRIM) 300 MG tablet TAKE 1 TABLET BY MOUTH EVERY DAY 90 tablet 1    alpha lipoic acid 600 mg Cap TAKE 1 CAPSULE BY MOUTH ONCE DAILY 90 each 3    aspirin (ECOTRIN) 81 MG EC tablet TAKE 1 TABLET BY MOUTH EVERY DAY 32 tablet 0    carvediloL (COREG) 6.25 MG tablet Take 1 tablet (6.25 mg total) by mouth 2 (two) times daily. 60 tablet 0    cyclobenzaprine (FLEXERIL) 10 MG tablet TAKE 1 TABLET BY MOUTH THREE TIMES A DAY AS NEEDED FOR MUSCLE SPASMS 90 tablet 1    diazePAM (VALIUM) 5 MG tablet Take 1 tablet (5 mg total) by mouth once. Take 1 tablet 1 hour before MRI procedure. Can take an additional dose immediately before MRI if needed. Do not drive after taking for 1 dose 2 tablet 0    finasteride (PROSCAR) 5 mg tablet TAKE 1 TABLET BY MOUTH EVERY DAY 90 tablet 1    fluticasone  propionate (FLONASE) 50 mcg/actuation nasal spray SPRAY 1 SPRAY INTO EACH NOSTRIL EVERY DAY 48 mL 1    fluticasone-umeclidin-vilanter (TRELEGY ELLIPTA) 200-62.5-25 mcg inhaler Inhale 1 puff into the lungs once daily. 60 each 11    ketoconazole (NIZORAL) 2 % cream Apply 1 application  topically as needed.      loratadine (CLARITIN) 10 mg tablet Take 1 tablet by mouth once daily. Take if needed      multivitamin capsule Take 1 capsule by mouth once daily.       omega-3 fatty acids/fish oil (FISH OIL-OMEGA-3 FATTY ACIDS) 300-1,000 mg capsule Take 1 capsule by mouth once daily.       ONETOUCH DELICA PLUS LANCET 30 gauge Misc USE 1 LANCETS TO TEST BLOOD GLUCOSE ONCE DAILY 100 each 3    ONETOUCH VERIO TEST STRIPS Strp TEST BLOOD LEVEL DAILY 100 strip 3    rosuvastatin (CRESTOR) 40 MG Tab Take 1 tablet by mouth Daily.      semaglutide (OZEMPIC) 1 mg/dose (4 mg/3 mL) INJECT 1 MG INTO THE SKIN EVERY 7 DAYS 9 mL 3    tamsulosin (FLOMAX) 0.4 mg Cap TAKE 1 CAPSULE BY MOUTH EVERY DAY 90 capsule 0    telmisartan-hydrochlorothiazide (MICARDIS HCT) 80-25 mg per tablet TAKE 1 TABLET BY MOUTH EVERY DAY 90 tablet 0    zafirlukast (ACCOLATE) 20 MG tablet TAKE 1 TABLET BY MOUTH TWICE A  tablet 0     Current Facility-Administered Medications on File Prior to Visit   Medication Dose Route Frequency Provider Last Rate Last Admin    0.9%  NaCl infusion   Intravenous Continuous Randell Shoemaker MD   Stopped at 08/09/23 1056    sodium chloride 0.9% flush 10 mL  10 mL Intravenous PRN Jade Mora MD           SOCIAL HISTORY:   Social History     Substance and Sexual Activity   Alcohol Use Not Currently    Comment: previously ~14 servings per week, did have period of sobriety for 10 months in 2024       Social History     Substance and Sexual Activity   Drug Use Never       ROS:   GENERAL: Denies fatigue  CARDIOVASCULAR: Denies edema  GI: Denies abdominal pain  SKIN: Denies rash, itching   NEURO: Denies confusion, memory  "loss, or mood changes    Objective Findings:    PHYSICAL EXAM:   Friendly White male, in no acute distress; alert and oriented to person, place and time  VITALS: Ht 5' 7" (1.702 m)   Wt 129.3 kg (285 lb)   BMI 44.64 kg/m²   EYES: Sclerae anicteric  GI: Soft, non-tender, non-distended. No ascites.  EXTREMITIES:  No edema.  SKIN: Warm and dry. No jaundice. No telangectasias noted. No palmar erythema.  NEURO:  No asterixis.  PSYCH:  Thought and speech pattern appropriate. Behavior normal        EDUCATION:  See instructions discussed with patient in Instructions section of the After Visit Summary       ASSESSMENT & PLAN:  69 y.o. White male with:  1.   Fatty liver, likely previous alcohol + metabolic risk factors  -- Risk factors for fatty liver include previously regular alcohol use, obesity, HTN, HLD, h/o preDM  -- MRI elasto done 12/2024 noted fatty liver   - Synthetic liver function WNL  -- MRI elasto 12/2024 noted no fibrosis, severe steatosis - will repeat yearly. If stable in 1 year and liver enzymes normal,can change to q2 years   -- recommendations to pt:  1. Limit alcohol consumption, no more than 2 servings of alcohol in any day (1 serving is 5 ounces of wine, 12 ounces of beer, or 1.5 ounces of liquor) and do NOT recommend any daily alcohol use   2. Weight loss goal of 25-50 lbs  3. Low carb/sugar, high fiber and protein diet  4. Exercise, 5 days per week, 30 minutes per day, as tolerated  5. Recommend good cholesterol, blood pressure, blood sugar levels   6. No indication for Rezdiffra currently, no fibrosis      2. Previously elevated liver enzymes  -- Serological workup in HPI     3. Obesity, HTN, HLD  -- would benefit from increase in GLP-1, as needs to lose ~25-50 lbs from a fatty liver perspective. Pt to inquire if interested   -- Body mass index is 44.64 kg/m².   -- increases risk for fatty liver          Follow up in about 1 year (around 1/3/2026). with MRI elasto before  Orders Placed This " Encounter   Procedures    MR Elastography        Thank you for allowing me to participate in the care of Elmer Cantor NP-C    I spent a total of 30 minutes on the day of the visit.This includes face to face time and non-face to face time preparing to see the patient (eg, review of tests), obtaining and/or reviewing separately obtained history, documenting clinical information in the electronic or other health record, independently interpreting results and communicating results to the patient/family/caregiver, and coordinating care.       CC'ed note to:

## 2025-01-03 NOTE — PATIENT INSTRUCTIONS
1. MRI to look for fat or scar tissue in the liver showed >70% fat in the liver, no scar tissue damage   2. Follow up in 1 year with MRI before    These things are important to improve fatty liver:  Limit alcohol consumption, no more than 2 serving(s) of alcohol in any day (1 serving is 5 ounces of wine, 12 ounces of beer, or 1.5 ounces of liquor) and do NOT recommend any daily alcohol use    2 Weight loss goal of 25-50 lbs. Would benefit from higher dose   3. Avoid processed foods. No fried/fast foods. No sugary drinks or drinks with any calories.   4. Low carb/sugar and high protein diet (130 grams per day of protein).Try to limit your carb intake to LESS than  grams per day total. Use MyFitness Pal or [employer] It usha to add up your carbs through the day. Do NOT drink any beverages with calories or carbs (this can lead to high blood sugar and weight gain). The main thing to focus on is high protein, low carb)  5. Exercise, 5 days per week, 30 minutes per day, as tolerated  6. Recommend good cholesterol, blood pressure, blood sugar levels   7. There is a new medication called Rezdiffra for the treatment of F2-F3 liver scarring due to fatty liver. It is only indicated for patients with significant scar tissue from fatty liver (not you currently)    In some people, fatty liver can progress to steatohepatitis (inflamatory fatty liver) and possibly to cirrhosis, increasing the risk for liver cancer, liver failure, and death. Therefore, the lifestyle changes are very important to decrease this risk.     Helpful website for CASSANDRA/fatty liver: [email or web address]

## 2025-01-07 PROBLEM — E66.01 MORBID OBESITY WITH BMI OF 40.0-44.9, ADULT: Status: RESOLVED | Noted: 2021-11-10 | Resolved: 2025-01-07

## 2025-05-14 ENCOUNTER — TELEPHONE (OUTPATIENT)
Dept: RESEARCH | Facility: HOSPITAL | Age: 70
End: 2025-05-14
Payer: MEDICARE

## 2025-05-14 NOTE — TELEPHONE ENCOUNTER
Study title: TERENCE CHAO  IRB #: [id number]      Patient called to discuss participation into the TERENCE CHAO study. Spoke to patient's wife and explained overview of study. Patient's wife expressed he is willing to see us on  6/3/25 at 11:00 AM. Reminder email will be sent about appointment.

## 2025-06-03 ENCOUNTER — RESEARCH ENCOUNTER (OUTPATIENT)
Dept: RESEARCH | Facility: HOSPITAL | Age: 70
End: 2025-06-03
Payer: MEDICARE

## 2025-06-16 ENCOUNTER — TELEPHONE (OUTPATIENT)
Dept: RESEARCH | Facility: HOSPITAL | Age: 70
End: 2025-06-16
Payer: MEDICARE

## (undated) DEVICE — SUT VICRYL PLUS 2-0 CT1 18

## (undated) DEVICE — SEE MEDLINE ITEM 146417

## (undated) DEVICE — SEE MEDLINE ITEM 152487

## (undated) DEVICE — BUR BONE CUT MICRO TPS 3X3.8MM

## (undated) DEVICE — BOWL CEMENT

## (undated) DEVICE — SYR 50CC LL

## (undated) DEVICE — SYR ONLY LUER LOCK 20CC

## (undated) DEVICE — SPONGE GAUZE 16PLY 4X4

## (undated) DEVICE — DRESSING AQUACEL SACRAL 9 X 9

## (undated) DEVICE — BLADE MILL BONE MEDIUM

## (undated) DEVICE — SET CEMENT (SCULP)

## (undated) DEVICE — SOL IRR NACL .9% 3000ML

## (undated) DEVICE — DRAPE STERI-DRAPE 1000 17X11IN

## (undated) DEVICE — ELECTRODE REM PLYHSV RETURN 9

## (undated) DEVICE — DRESSING TRANS 4X4 TEGADERM

## (undated) DEVICE — DRAPE STERI INSTRUMENT 1018

## (undated) DEVICE — SPONGE LAP 4X18 PREWASHED

## (undated) DEVICE — SUT VICRYL+ 1 CT1 18IN

## (undated) DEVICE — NDL 18GA X1 1/2 REG BEVEL

## (undated) DEVICE — TRAY FOLEY 16FR INFECTION CONT

## (undated) DEVICE — TAPE SURG DURAPORE 2 X10YD

## (undated) DEVICE — MARKER SKIN STND TIP BLUE BARR

## (undated) DEVICE — SUT 2/0 30IN SILK BLK BRAI

## (undated) DEVICE — BURR RND FLUT SFT TOUCH 2.0MM

## (undated) DEVICE — ELECTRODE BLD 1 INCH TEFLON

## (undated) DEVICE — DRESSING AQUACEL FOAM 4 X 12

## (undated) DEVICE — ADHESIVE DERMABOND ADVANCED

## (undated) DEVICE — SYS CLSR DERMABOND PRINEO 22CM

## (undated) DEVICE — DRAPE INCISE IOBAN 2 23X33IN

## (undated) DEVICE — HOOD T-5 TEAR AWAY STERILE

## (undated) DEVICE — SEE MEDLINE ITEM 157150

## (undated) DEVICE — SUT VICRYL PLUS 0 CT1 18IN

## (undated) DEVICE — SEE MEDLINE ITEM 157131

## (undated) DEVICE — CLOSURE SKIN STERI STRIP 1/2X4

## (undated) DEVICE — SEE MEDLINE ITEM 146298

## (undated) DEVICE — KIT EVACUATOR 3-SPRING 1/8 DRN

## (undated) DEVICE — DRESSING MEPILEX BORDER 4 X 4

## (undated) DEVICE — DRESSING AQUACEL FOAM 3 X 3

## (undated) DEVICE — BLADE ELECTRO EDGE INSULATED

## (undated) DEVICE — PAD CAST SPECIALIST STRL 6

## (undated) DEVICE — PUMP COLD THERAPY

## (undated) DEVICE — TIP SMART 309 3X8.5MM

## (undated) DEVICE — DRESSING AQUACEL AG ADV 3.5X12

## (undated) DEVICE — DIFFUSER

## (undated) DEVICE — KIT TOTAL KNEE TKOFG

## (undated) DEVICE — TOURNIQUET SB QC DP 34X4IN

## (undated) DEVICE — KIT IRR SUCTION HND PIECE

## (undated) DEVICE — SUT ETHIBOND 0 CR/CT-1 8-18

## (undated) DEVICE — APPLICATOR CHLORAPREP ORN 26ML

## (undated) DEVICE — SEE MEDLINE ITEM 154981

## (undated) DEVICE — MASK FLYTE HOOD PEEL AWAY

## (undated) DEVICE — ADHESIVE MASTISOL VIAL 48/BX

## (undated) DEVICE — PAD COLD THERAPY KNEE WRAP ON

## (undated) DEVICE — DRESSING AQUACEL AG 3.5X10IN

## (undated) DEVICE — DRAPE ABDOMINAL TIBURON 14X11

## (undated) DEVICE — SEE MEDLINE ITEM 157117

## (undated) DEVICE — DRAPE C-ARMOR EQUIPMENT COVER

## (undated) DEVICE — DRESSING AQUACEL FOAM 5 X 5

## (undated) DEVICE — NDL SPINAL 18GX3.5 SPINOCAN

## (undated) DEVICE — SYR 30CC LUER LOCK

## (undated) DEVICE — SEE MEDLINE ITEM 146231

## (undated) DEVICE — CARTRIDGE OIL

## (undated) DEVICE — Device

## (undated) DEVICE — 3.5 TAP

## (undated) DEVICE — CLOSURE SKIN 1X5 STERI-STRIP

## (undated) DEVICE — BLADE 4IN EDGE INSULATED

## (undated) DEVICE — KIT SURGIFLO HEMOSTATIC MATRIX

## (undated) DEVICE — BLADE SAGITTAL 18 X 1.27 X 90M

## (undated) DEVICE — SEE MEDLINE ITEM 156905

## (undated) DEVICE — ROUTER TAPERED 2.3MM

## (undated) DEVICE — NDL 22GA X1 1/2 REG BEVEL

## (undated) DEVICE — SUT MONOCRYL 3-0 PS-1